# Patient Record
Sex: FEMALE | Race: WHITE | NOT HISPANIC OR LATINO | Employment: OTHER | URBAN - METROPOLITAN AREA
[De-identification: names, ages, dates, MRNs, and addresses within clinical notes are randomized per-mention and may not be internally consistent; named-entity substitution may affect disease eponyms.]

---

## 2020-07-27 ENCOUNTER — OFFICE VISIT (OUTPATIENT)
Dept: NEUROLOGY | Facility: CLINIC | Age: 65
End: 2020-07-27
Payer: COMMERCIAL

## 2020-07-27 VITALS
WEIGHT: 118 LBS | SYSTOLIC BLOOD PRESSURE: 123 MMHG | HEIGHT: 62 IN | BODY MASS INDEX: 21.71 KG/M2 | DIASTOLIC BLOOD PRESSURE: 75 MMHG | HEART RATE: 102 BPM

## 2020-07-27 DIAGNOSIS — G43.719 INTRACTABLE CHRONIC MIGRAINE WITHOUT AURA AND WITHOUT STATUS MIGRAINOSUS: Primary | ICD-10-CM

## 2020-07-27 DIAGNOSIS — G43.711 CHRONIC MIGRAINE WITHOUT AURA, INTRACTABLE, WITH STATUS MIGRAINOSUS: ICD-10-CM

## 2020-07-27 DIAGNOSIS — M62.838 MUSCLE SPASM: ICD-10-CM

## 2020-07-27 PROBLEM — G43.909 MIGRAINE HEADACHE: Status: ACTIVE | Noted: 2020-07-27

## 2020-07-27 PROBLEM — R25.2 SPASM: Status: ACTIVE | Noted: 2020-07-27

## 2020-07-27 PROBLEM — G44.229 CHRONIC TENSION HEADACHE: Status: ACTIVE | Noted: 2020-07-27

## 2020-07-27 PROCEDURE — 99202 OFFICE O/P NEW SF 15 MIN: CPT | Mod: 25 | Performed by: PSYCHIATRY & NEUROLOGY

## 2020-07-27 PROCEDURE — 64615 CHEMODENERV MUSC MIGRAINE: CPT | Performed by: PSYCHIATRY & NEUROLOGY

## 2020-07-27 RX ORDER — SIMVASTATIN 20 MG
TABLET ORAL
COMMUNITY
Start: 2020-07-23

## 2020-07-27 ASSESSMENT — MIFFLIN-ST. JEOR: SCORE: 1043.49

## 2020-07-27 NOTE — PROGRESS NOTES
NEUROLOGY NOTE        Assessment/Plan          Migraine and severe daily headaches: More 15 days headaches a month, and lasting more than 4 hours each attach.  Tried and failed Effexor, gabapentin and propranolol, Pamelor, amitriptyline. Will try Botox if approved. Risks vs benefits discussed. Was denied in the past.  Botox 155 units used, . With patient consent 5 units were given to the each of the following sites: procerus, bilateral , 3 sites of each side of the temporalis, cervicali, trapezius, and 4 sites of each side of the occipitalis. No immediate post injections complications.    Bilateral shoulder muscle spasm. 45 units used for the shoulders.    Discussed about other treatment options. Wants to continue current treatment. Will follow up in 3 months.           SUBJECTIVE         The patient is here today for follow-up intractable daily headaches.     More than 60% headache improvements. No side effects. Working 3 days/week. Exercising regularly. No depression. Two children grown up. One son, Sherif, family practice residence at [facility]. Sleeping well.  owns his own business, cutting back too.    No COVID-19 sickness    Migraine used to be severe daily headaches: More 15 days headaches a month, and lasting more than 4 hours each attach.  Tried and failed Effexor, gabapentin and propranolol, Pamelor, amitriptyline. Will try Botox, first injection 4/13/2918.  Risks vs benefits discussed.    Prednisone did break her headaches. But after finishing a tapering dose the headache came back. No side effects from the medication. Since Botox treatment she no longer takes daily painkillers.    The headache was daily before Botox treatment, better after sleep, severe 6/10 most of the time. Was taking daily pain killers, Tylenol, ASA, Tylenol with caffein, Motrin, 6 pills daily. Not able to tolerate amitriptyline. Failed Midrin, Imitrex. Failed Pamelor, amitriptyline.    She has had headaches for 30  "years without change in pattern. She wakes up with headaches and go to bed with headaches. She described as almost constant aching pain on top of her head. Periodically it gets severe feel like her head going to crack open and she would get sick to her stomach then she described that as a migraine. The severe headache fluctuate. She used to take Tylenol, Tylenol with Codeine, Motrin, aspirin, total average 6-8 pills a day. Starting from September /2016 she stopped all the medications she feels the severe ones getting less frequent. But then then she has lost tracking of how often and how much she is taking the pain killers. Sick to stomach and sensitive to light and sound. No visual auras. Not taking daily pain killers since 7/11/2017.    She tried amitriptyline for 1 week but became very sleepy and stopped it.              Review of system     10 point system review otherwise unremarkable    PHYSICAL EXAMINATION     Vital signs in last 24 hours:  Vitals:    07/27/20 0807   BP: 123/75   Pulse: 102   Weight: 53.5 kg (118 lb)   Height: 1.575 m (5' 2\")       She sitting, NAD. She is well dressed and wound. Normal mental status language and speech. She is normocephalic and atraumatic. Cranial nerve II-12 unremarkable. She has normal muscle bulk, tone and strength in 4 extremities. No clear focal sensory difficulties. Her coordination, gait and station normal. Muscles in shoulder are tense          Problem List     Patient Active Problem List    Diagnosis Date Noted     Migraine headache 07/27/2020     Priority: Medium     Chronic tension headache 07/27/2020     Priority: Medium     Spasm 07/27/2020     Priority: Medium         Past medical history     No past surgical history on file.    No past medical history on file.      Family history     No family history on file.      Social history     Social History     Socioeconomic History     Marital status:      Spouse name: Not on file     Number of children: Not on " file     Years of education: Not on file     Highest education level: Not on file   Occupational History     Not on file   Social Needs     Financial resource strain: Not on file     Food insecurity     Worry: Not on file     Inability: Not on file     Transportation needs     Medical: Not on file     Non-medical: Not on file   Tobacco Use     Smoking status: Never Smoker     Smokeless tobacco: Never Used   Substance and Sexual Activity     Alcohol use: Not Currently     Drug use: Not on file     Sexual activity: Not on file   Lifestyle     Physical activity     Days per week: Not on file     Minutes per session: Not on file     Stress: Not on file   Relationships     Social connections     Talks on phone: Not on file     Gets together: Not on file     Attends Temple service: Not on file     Active member of club or organization: Not on file     Attends meetings of clubs or organizations: Not on file     Relationship status: Not on file     Intimate partner violence     Fear of current or ex partner: Not on file     Emotionally abused: Not on file     Physically abused: Not on file     Forced sexual activity: Not on file   Other Topics Concern     Not on file   Social History Narrative     Not on file         Allergy     Patient has no known allergies.    MEDICATIONS List     Current Outpatient Medications   Medication Sig Dispense Refill     simvastatin (ZOCOR) 20 MG tablet                    RESULTS REVIEW         Khalida Cifuentes MD, MD, PhD  Neurology   Office tel: [phone]      Subjective     CC: Kailee Lam  is a 65 year old female who presents to clinic today for the following health issues:   Chief Complaint   Patient presents with     Injections     KHALIDA White

## 2020-07-27 NOTE — LETTER
7/27/2020         RE: Kailee Lam  [address]  [facility] [address]        Dear Colleague,    Thank you for referring your patient, Kailee Lam, to the [facility] [place]. Please see a copy of my visit note below.        NEUROLOGY NOTE        Assessment/Plan          Migraine and severe daily headaches: More 15 days headaches a month, and lasting more than 4 hours each attach.  Tried and failed Effexor, gabapentin and propranolol, Pamelor, amitriptyline. Will try Botox if approved. Risks vs benefits discussed. Was denied in the past.  Botox 155 units used, . With patient consent 5 units were given to the each of the following sites: procerus, bilateral , 3 sites of each side of the temporalis, cervicali, trapezius, and 4 sites of each side of the occipitalis. No immediate post injections complications.    Bilateral shoulder muscle spasm. 45 units used for the shoulders.    Discussed about other treatment options. Wants to continue current treatment. Will follow up in 3 months.           SUBJECTIVE         The patient is here today for follow-up intractable daily headaches.     More than 60% headache improvements. No side effects. Working 3 days/week. Exercising regularly. No depression. Two children grown up. One son, Sherif, family practice residence at [facility]. Sleeping well.  owns his own business, cutting back too.    No COVID-19 sickness    Migraine used to be severe daily headaches: More 15 days headaches a month, and lasting more than 4 hours each attach.  Tried and failed Effexor, gabapentin and propranolol, Pamelor, amitriptyline. Will try Botox, first injection 4/13/2918.  Risks vs benefits discussed.    Prednisone did break her headaches. But after finishing a tapering dose the headache came back. No side effects from the medication. Since Botox treatment she no longer takes daily painkillers.    The headache was daily before Botox treatment, better  "after sleep, severe 6/10 most of the time. Was taking daily pain killers, Tylenol, ASA, Tylenol with caffein, Motrin, 6 pills daily. Not able to tolerate amitriptyline. Failed Midrin, Imitrex. Failed Pamelor, amitriptyline.    She has had headaches for 30 years without change in pattern. She wakes up with headaches and go to bed with headaches. She described as almost constant aching pain on top of her head. Periodically it gets severe feel like her head going to crack open and she would get sick to her stomach then she described that as a migraine. The severe headache fluctuate. She used to take Tylenol, Tylenol with Codeine, Motrin, aspirin, total average 6-8 pills a day. Starting from September /2016 she stopped all the medications she feels the severe ones getting less frequent. But then then she has lost tracking of how often and how much she is taking the pain killers. Sick to stomach and sensitive to light and sound. No visual auras. Not taking daily pain killers since 7/11/2017.    She tried amitriptyline for 1 week but became very sleepy and stopped it.              Review of system     10 point system review otherwise unremarkable    PHYSICAL EXAMINATION     Vital signs in last 24 hours:  Vitals:    07/27/20 0807   BP: 123/75   Pulse: 102   Weight: 53.5 kg (118 lb)   Height: 1.575 m (5' 2\")       She sitting, NAD. She is well dressed and wound. Normal mental status language and speech. She is normocephalic and atraumatic. Cranial nerve II-12 unremarkable. She has normal muscle bulk, tone and strength in 4 extremities. No clear focal sensory difficulties. Her coordination, gait and station normal. Muscles in shoulder are tense          Problem List     Patient Active Problem List    Diagnosis Date Noted     Migraine headache 07/27/2020     Priority: Medium     Chronic tension headache 07/27/2020     Priority: Medium     Spasm 07/27/2020     Priority: Medium         Past medical history     No past surgical " history on file.    No past medical history on file.      Family history     No family history on file.      Social history     Social History     Socioeconomic History     Marital status:      Spouse name: Not on file     Number of children: Not on file     Years of education: Not on file     Highest education level: Not on file   Occupational History     Not on file   Social Needs     Financial resource strain: Not on file     Food insecurity     Worry: Not on file     Inability: Not on file     Transportation needs     Medical: Not on file     Non-medical: Not on file   Tobacco Use     Smoking status: Never Smoker     Smokeless tobacco: Never Used   Substance and Sexual Activity     Alcohol use: Not Currently     Drug use: Not on file     Sexual activity: Not on file   Lifestyle     Physical activity     Days per week: Not on file     Minutes per session: Not on file     Stress: Not on file   Relationships     Social connections     Talks on phone: Not on file     Gets together: Not on file     Attends Hinduism service: Not on file     Active member of club or organization: Not on file     Attends meetings of clubs or organizations: Not on file     Relationship status: Not on file     Intimate partner violence     Fear of current or ex partner: Not on file     Emotionally abused: Not on file     Physically abused: Not on file     Forced sexual activity: Not on file   Other Topics Concern     Not on file   Social History Narrative     Not on file         Allergy     Patient has no known allergies.    MEDICATIONS List     Current Outpatient Medications   Medication Sig Dispense Refill     simvastatin (ZOCOR) 20 MG tablet                    RESULTS REVIEW         Khalida Cifuentes MD, MD, PhD  Neurology   Office tel: [phone]      Subjective     CC: Kailee Lam  is a 65 year old female who presents to clinic today for the following health issues:   Chief Complaint   Patient presents with     Injections      KHALIDA White    Again, thank you for allowing me to participate in the care of your patient.        Sincerely,        Khalida Cifuentes MD

## 2020-08-11 ENCOUNTER — TELEPHONE (OUTPATIENT)
Dept: NEUROLOGY | Facility: CLINIC | Age: 65
End: 2020-08-11

## 2020-10-30 ENCOUNTER — OFFICE VISIT (OUTPATIENT)
Dept: NEUROLOGY | Facility: CLINIC | Age: 65
End: 2020-10-30
Payer: COMMERCIAL

## 2020-10-30 VITALS
HEIGHT: 62 IN | BODY MASS INDEX: 22.08 KG/M2 | SYSTOLIC BLOOD PRESSURE: 119 MMHG | WEIGHT: 120 LBS | HEART RATE: 90 BPM | DIASTOLIC BLOOD PRESSURE: 74 MMHG

## 2020-10-30 DIAGNOSIS — G43.711 CHRONIC MIGRAINE WITHOUT AURA, INTRACTABLE, WITH STATUS MIGRAINOSUS: ICD-10-CM

## 2020-10-30 DIAGNOSIS — G43.719 INTRACTABLE CHRONIC MIGRAINE WITHOUT AURA AND WITHOUT STATUS MIGRAINOSUS: Primary | ICD-10-CM

## 2020-10-30 PROBLEM — R73.03 PREDIABETES: Status: ACTIVE | Noted: 2017-11-24

## 2020-10-30 PROBLEM — R31.0 GROSS HEMATURIA: Status: ACTIVE | Noted: 2020-07-23

## 2020-10-30 PROCEDURE — 64642 CHEMODENERV 1 EXTREMITY 1-4: CPT | Mod: XS | Performed by: PSYCHIATRY & NEUROLOGY

## 2020-10-30 PROCEDURE — 64615 CHEMODENERV MUSC MIGRAINE: CPT | Performed by: PSYCHIATRY & NEUROLOGY

## 2020-10-30 PROCEDURE — 64643 CHEMODENERV 1 EXTREM 1-4 EA: CPT | Mod: LT | Performed by: PSYCHIATRY & NEUROLOGY

## 2020-10-30 PROCEDURE — 99207 PR NO CHARGE LOS: CPT | Performed by: PSYCHIATRY & NEUROLOGY

## 2020-10-30 ASSESSMENT — MIFFLIN-ST. JEOR: SCORE: 1052.57

## 2020-10-30 NOTE — PROGRESS NOTES
NEUROLOGY NOTE        Assessment/Plan          Migraine and severe daily headaches: More 15 days headaches a month, and lasting more than 4 hours each attach.  Tried and failed Effexor, gabapentin and propranolol, Pamelor, amitriptyline. Will try Botox if approved. Risks vs benefits discussed. Was denied in the past.  Botox 155 units used, . With patient consent 5 units were given to the each of the following sites: procerus, bilateral , 3 sites of each side of the temporalis, cervicali, trapezius, and 4 sites of each side of the occipitalis. No immediate post injections complications.    Bilateral shoulder muscle spasm. 45 units used for the shoulders.    Discussed about other treatment options. Wants to continue current treatment. Will follow up in 3 months.          SUBJECTIVE         Migraine and severe daily headaches: More 15 days headaches a month, and lasting more than 4 hours each attach.  Tried and failed Effexor, gabapentin and propranolol, Pamelor, amitriptyline. Will try Botox if approved. Risks vs benefits discussed. Was denied in the past.  Botox 155 units used, . With patient consent 5 units were given to the each of the following sites: procerus, bilateral , 3 sites of each side of the temporalis, cervicali, trapezius, and 4 sites of each side of the occipitalis. No immediate post injections complications.    Bilateral shoulder muscle spasm. 45 units used for the shoulders.    Discussed about other treatment options. Wants to continue current treatment. Will follow up in 3 months.               SUBJECTIVE            The patient is here today for follow-up intractable daily headaches.     More than 60% headache improvements. No side effects. Working 3 days/week. Exercising regularly. No depression. Two children grown up. One son, Sherif, family practice residence at [facility]. Sleeping well.  owns his own business, cutting back too.    No COVID-19 sickness    Migraine  "used to be severe daily headaches: More 15 days headaches a month, and lasting more than 4 hours each attach.  Tried and failed Effexor, gabapentin and propranolol, Pamelor, amitriptyline. Will try Botox, first injection 4/13/2918.  Risks vs benefits discussed.    Prednisone did break her headaches. But after finishing a tapering dose the headache came back. No side effects from the medication. Since Botox treatment she no longer takes daily painkillers.    The headache was daily before Botox treatment, better after sleep, severe 6/10 most of the time. Was taking daily pain killers, Tylenol, ASA, Tylenol with caffein, Motrin, 6 pills daily. Not able to tolerate amitriptyline. Failed Midrin, Imitrex. Failed Pamelor, amitriptyline.    She has had headaches for 30 years without change in pattern. She wakes up with headaches and go to bed with headaches. She described as almost constant aching pain on top of her head. Periodically it gets severe feel like her head going to crack open and she would get sick to her stomach then she described that as a migraine. The severe headache fluctuate. She used to take Tylenol, Tylenol with Codeine, Motrin, aspirin, total average 6-8 pills a day. Starting from September /2016 she stopped all the medications she feels the severe ones getting less frequent. But then then she has lost tracking of how often and how much she is taking the pain killers. Sick to stomach and sensitive to light and sound. No visual auras. Not taking daily pain killers since 7/11/2017.    She tried amitriptyline for 1 week but became very sleepy and stopped it.                    Review of system     10 point system review otherwise unremarkable    PHYSICAL EXAMINATION     Vital signs in last 24 hours:  Vitals:    10/30/20 1004   BP: 119/74   Pulse: 90   Weight: 54.4 kg (120 lb)   Height: 1.575 m (5' 2\")     She sitting, NAD. She is well dressed and wound. Normal mental status language and speech. She is " normocephalic and atraumatic. Cranial nerve II-12 unremarkable. She has normal muscle bulk, tone and strength in 4 extremities. No clear focal sensory difficulties. Her coordination, gait and station normal. Muscles in shoulder are tense         Problem List     Patient Active Problem List    Diagnosis Date Noted     Migraine headache 07/27/2020     Priority: Medium     Chronic tension headache 07/27/2020     Priority: Medium     Spasm 07/27/2020     Priority: Medium     Gross hematuria 07/23/2020     Priority: Medium     Chronic. Followed by [facility]       Prediabetes 11/24/2017     Priority: Medium     Dysplastic nevus 07/06/2012     Priority: Medium     Needs yearly skin check by Dermatology.       Mixed hyperlipidemia 11/19/2002     Priority: Medium     Allergic rhinitis 11/19/2002     Priority: Medium     Epic           Past medical history     migraine      Family history     History reviewed. No pertinent family history.      Social history     Social History     Socioeconomic History     Marital status:      Spouse name: Not on file     Number of children: Not on file     Years of education: Not on file     Highest education level: Not on file   Occupational History     Not on file   Social Needs     Financial resource strain: Not on file     Food insecurity     Worry: Not on file     Inability: Not on file     Transportation needs     Medical: Not on file     Non-medical: Not on file   Tobacco Use     Smoking status: Never Smoker     Smokeless tobacco: Never Used   Substance and Sexual Activity     Alcohol use: Not Currently     Drug use: Not on file     Sexual activity: Not on file   Lifestyle     Physical activity     Days per week: Not on file     Minutes per session: Not on file     Stress: Not on file   Relationships     Social connections     Talks on phone: Not on file     Gets together: Not on file     Attends Bahai service: Not on file     Active member of club or organization: Not  on file     Attends meetings of clubs or organizations: Not on file     Relationship status: Not on file     Intimate partner violence     Fear of current or ex partner: Not on file     Emotionally abused: Not on file     Physically abused: Not on file     Forced sexual activity: Not on file   Other Topics Concern     Not on file   Social History Narrative     Not on file         Allergy     Patient has no known allergies.    MEDICATIONS List     Current Outpatient Medications   Medication Sig Dispense Refill     simvastatin (ZOCOR) 20 MG tablet                      Khalida Cifuentes MD, MD, PhD  Neurology   Office tel: [phone]        This note was dictated using voice recognition software.  Any grammatical or context distortions are unintentional and inherent to the software. The note is tailored to serve physicians for communications among them, who knows what are the most important elements of history taken for disease diagnosis and differentials as well as management plans. Due to time factors, the notes are in general not reviewed before signing. Again due to time factor, follow-up notes often carries over old notes if they are relevant, so most clinic time is dedicated to interviewing with patients and caregivers, on clinical assessment, coordinating care and management.

## 2020-10-30 NOTE — LETTER
10/30/2020         RE: Kailee Lam  [address] [facility] [address]        Dear Colleague,    Thank you for referring your patient, Kailee Lam, to the [facility] [place]. Please see a copy of my visit note below.        NEUROLOGY NOTE        Assessment/Plan          Migraine and severe daily headaches: More 15 days headaches a month, and lasting more than 4 hours each attach.  Tried and failed Effexor, gabapentin and propranolol, Pamelor, amitriptyline. Will try Botox if approved. Risks vs benefits discussed. Was denied in the past.  Botox 155 units used, . With patient consent 5 units were given to the each of the following sites: procerus, bilateral , 3 sites of each side of the temporalis, cervicali, trapezius, and 4 sites of each side of the occipitalis. No immediate post injections complications.    Bilateral shoulder muscle spasm. 45 units used for the shoulders.    Discussed about other treatment options. Wants to continue current treatment. Will follow up in 3 months.          SUBJECTIVE         Migraine and severe daily headaches: More 15 days headaches a month, and lasting more than 4 hours each attach.  Tried and failed Effexor, gabapentin and propranolol, Pamelor, amitriptyline. Will try Botox if approved. Risks vs benefits discussed. Was denied in the past.  Botox 155 units used, . With patient consent 5 units were given to the each of the following sites: procerus, bilateral , 3 sites of each side of the temporalis, cervicali, trapezius, and 4 sites of each side of the occipitalis. No immediate post injections complications.    Bilateral shoulder muscle spasm. 45 units used for the shoulders.    Discussed about other treatment options. Wants to continue current treatment. Will follow up in 3 months.               SUBJECTIVE            The patient is here today for follow-up intractable daily headaches.     More than 60% headache  improvements. No side effects. Working 3 days/week. Exercising regularly. No depression. Two children grown up. One son, Sherif, family practice residence at [facility]. Sleeping well.  owns his own business, cutting back too.    No COVID-19 sickness    Migraine used to be severe daily headaches: More 15 days headaches a month, and lasting more than 4 hours each attach.  Tried and failed Effexor, gabapentin and propranolol, Pamelor, amitriptyline. Will try Botox, first injection 4/13/2918.  Risks vs benefits discussed.    Prednisone did break her headaches. But after finishing a tapering dose the headache came back. No side effects from the medication. Since Botox treatment she no longer takes daily painkillers.    The headache was daily before Botox treatment, better after sleep, severe 6/10 most of the time. Was taking daily pain killers, Tylenol, ASA, Tylenol with caffein, Motrin, 6 pills daily. Not able to tolerate amitriptyline. Failed Midrin, Imitrex. Failed Pamelor, amitriptyline.    She has had headaches for 30 years without change in pattern. She wakes up with headaches and go to bed with headaches. She described as almost constant aching pain on top of her head. Periodically it gets severe feel like her head going to crack open and she would get sick to her stomach then she described that as a migraine. The severe headache fluctuate. She used to take Tylenol, Tylenol with Codeine, Motrin, aspirin, total average 6-8 pills a day. Starting from September /2016 she stopped all the medications she feels the severe ones getting less frequent. But then then she has lost tracking of how often and how much she is taking the pain killers. Sick to stomach and sensitive to light and sound. No visual auras. Not taking daily pain killers since 7/11/2017.    She tried amitriptyline for 1 week but became very sleepy and stopped it.                    Review of system     10 point system review otherwise  "unremarkable    PHYSICAL EXAMINATION     Vital signs in last 24 hours:  Vitals:    10/30/20 1004   BP: 119/74   Pulse: 90   Weight: 54.4 kg (120 lb)   Height: 1.575 m (5' 2\")     She sitting, NAD. She is well dressed and wound. Normal mental status language and speech. She is normocephalic and atraumatic. Cranial nerve II-12 unremarkable. She has normal muscle bulk, tone and strength in 4 extremities. No clear focal sensory difficulties. Her coordination, gait and station normal. Muscles in shoulder are tense         Problem List     Patient Active Problem List    Diagnosis Date Noted     Migraine headache 07/27/2020     Priority: Medium     Chronic tension headache 07/27/2020     Priority: Medium     Spasm 07/27/2020     Priority: Medium     Gross hematuria 07/23/2020     Priority: Medium     Chronic. Followed by [facility]       Prediabetes 11/24/2017     Priority: Medium     Dysplastic nevus 07/06/2012     Priority: Medium     Needs yearly skin check by Dermatology.       Mixed hyperlipidemia 11/19/2002     Priority: Medium     Allergic rhinitis 11/19/2002     Priority: Medium     Epic           Past medical history     migraine      Family history     History reviewed. No pertinent family history.      Social history     Social History     Socioeconomic History     Marital status:      Spouse name: Not on file     Number of children: Not on file     Years of education: Not on file     Highest education level: Not on file   Occupational History     Not on file   Social Needs     Financial resource strain: Not on file     Food insecurity     Worry: Not on file     Inability: Not on file     Transportation needs     Medical: Not on file     Non-medical: Not on file   Tobacco Use     Smoking status: Never Smoker     Smokeless tobacco: Never Used   Substance and Sexual Activity     Alcohol use: Not Currently     Drug use: Not on file     Sexual activity: Not on file   Lifestyle     Physical activity     Days " per week: Not on file     Minutes per session: Not on file     Stress: Not on file   Relationships     Social connections     Talks on phone: Not on file     Gets together: Not on file     Attends Church service: Not on file     Active member of club or organization: Not on file     Attends meetings of clubs or organizations: Not on file     Relationship status: Not on file     Intimate partner violence     Fear of current or ex partner: Not on file     Emotionally abused: Not on file     Physically abused: Not on file     Forced sexual activity: Not on file   Other Topics Concern     Not on file   Social History Narrative     Not on file         Allergy     Patient has no known allergies.    MEDICATIONS List     Current Outpatient Medications   Medication Sig Dispense Refill     simvastatin (ZOCOR) 20 MG tablet                      Khalida Cifuentes MD, MD, PhD  Neurology   Office tel: [phone]        This note was dictated using voice recognition software.  Any grammatical or context distortions are unintentional and inherent to the software. The note is tailored to serve physicians for communications among them, who knows what are the most important elements of history taken for disease diagnosis and differentials as well as management plans. Due to time factors, the notes are in general not reviewed before signing. Again due to time factor, follow-up notes often carries over old notes if they are relevant, so most clinic time is dedicated to interviewing with patients and caregivers, on clinical assessment, coordinating care and management.         Again, thank you for allowing me to participate in the care of your patient.        Sincerely,        Khalida Cifuentes MD

## 2020-11-03 ENCOUNTER — TELEPHONE (OUTPATIENT)
Dept: NEUROLOGY | Facility: CLINIC | Age: 65
End: 2020-11-03

## 2021-02-02 ENCOUNTER — OFFICE VISIT (OUTPATIENT)
Dept: NEUROLOGY | Facility: CLINIC | Age: 66
End: 2021-02-02
Payer: COMMERCIAL

## 2021-02-02 VITALS
HEIGHT: 62 IN | WEIGHT: 120 LBS | SYSTOLIC BLOOD PRESSURE: 128 MMHG | DIASTOLIC BLOOD PRESSURE: 76 MMHG | BODY MASS INDEX: 22.08 KG/M2 | HEART RATE: 95 BPM

## 2021-02-02 DIAGNOSIS — M62.838 MUSCLE SPASM: ICD-10-CM

## 2021-02-02 DIAGNOSIS — G43.711 CHRONIC MIGRAINE WITHOUT AURA, INTRACTABLE, WITH STATUS MIGRAINOSUS: Primary | ICD-10-CM

## 2021-02-02 PROCEDURE — 64642 CHEMODENERV 1 EXTREMITY 1-4: CPT | Mod: 59 | Performed by: PSYCHIATRY & NEUROLOGY

## 2021-02-02 PROCEDURE — 64643 CHEMODENERV 1 EXTREM 1-4 EA: CPT | Mod: 59 | Performed by: PSYCHIATRY & NEUROLOGY

## 2021-02-02 PROCEDURE — 64615 CHEMODENERV MUSC MIGRAINE: CPT | Mod: 59 | Performed by: PSYCHIATRY & NEUROLOGY

## 2021-02-02 ASSESSMENT — MIFFLIN-ST. JEOR: SCORE: 1052.57

## 2021-02-02 NOTE — PROGRESS NOTES
NEUROLOGY NOTE        Assessment/Plan          Migraine and severe daily headaches: More 15 days headaches a month, and lasting more than 4 hours each attach.  Tried and failed Effexor, gabapentin and propranolol, Pamelor, amitriptyline. Will try Botox if approved. Risks vs benefits discussed. Was denied in the past.  Total Botox 170 units used 140 for migraine and 30 units for left shoulder spasm.  50 units discarded.  Consent received.   No immediate post injections complications.    Bilateral shoulder muscle spasm. 30 units used for the shoulders.      Will follow up in 3 months.           SUBJECTIVE         The patient is here today for follow-up intractable daily headaches.     More than 60% headache improvements. No side effects. Working 3 days/week. Exercising regularly. No depression. Two children grown up. One son, Sherif, family practice residence at [facility]. Sleeping well.  owns his own business, cutting back too.    No COVID-19 sickness    Migraine used to be severe daily headaches: More 15 days headaches a month, and lasting more than 4 hours each attach.  Tried and failed Effexor, gabapentin and propranolol, Pamelor, amitriptyline. Will try Botox, first injection 4/13/2918.  Risks vs benefits discussed.    Prednisone did break her headaches. But after finishing a tapering dose the headache came back. No side effects from the medication. Since Botox treatment she no longer takes daily painkillers.    The headache was daily before Botox treatment, better after sleep, severe 6/10 most of the time. Was taking daily pain killers, Tylenol, ASA, Tylenol with caffein, Motrin, 6 pills daily. Not able to tolerate amitriptyline. Failed Midrin, Imitrex. Failed Pamelor, amitriptyline.    She has had headaches for 30 years without change in pattern. She wakes up with headaches and go to bed with headaches. She described as almost constant aching pain on top of her head. Periodically it gets severe feel  "like her head going to crack open and she would get sick to her stomach then she described that as a migraine. The severe headache fluctuate. She used to take Tylenol, Tylenol with Codeine, Motrin, aspirin, total average 6-8 pills a day. Starting from September /2016 she stopped all the medications she feels the severe ones getting less frequent. But then then she has lost tracking of how often and how much she is taking the pain killers. Sick to stomach and sensitive to light and sound. No visual auras. Not taking daily pain killers since 7/11/2017.    She tried amitriptyline for 1 week but became very sleepy and stopped it.               Review of system     10 point system review otherwise unremarkable    PHYSICAL EXAMINATION     Vital signs in last 24 hours:  Vitals:    02/02/21 0902   BP: 128/76   Pulse: 95   Weight: 54.4 kg (120 lb)   Height: 1.575 m (5' 2\")       The patient is sitting in chair without acute distress. Well dressed, groomed, and nourished. Atraumatic, and normocephalic.  Mood is fine.  Cognitive function appropriate.  Exam of the head, neck, eyes, ears, nose and mouth negative.  Neck supple.  No skin rashes or edema. Normal mental status, language, and speech. CN II to XII unremarkable. Normal msucle bulk, tone, and strength. No focal sensory difficulty. Coordination, gait, and station normal.       Problem List     Patient Active Problem List    Diagnosis Date Noted     Migraine headache 07/27/2020     Priority: Medium     Chronic tension headache 07/27/2020     Priority: Medium     Spasm 07/27/2020     Priority: Medium     Gross hematuria 07/23/2020     Priority: Medium     Chronic. Followed by [facility]       Prediabetes 11/24/2017     Priority: Medium     Dysplastic nevus 07/06/2012     Priority: Medium     Needs yearly skin check by Dermatology.       Mixed hyperlipidemia 11/19/2002     Priority: Medium     Allergic rhinitis 11/19/2002     Priority: Medium     Epic           Past " medical history     No past surgical history on file.    Past Medical History:   Diagnosis Date     Intractable migraine without aura and with status migrainosus            Family history     No family history on file.      Social history     Social History     Socioeconomic History     Marital status:      Spouse name: Not on file     Number of children: Not on file     Years of education: Not on file     Highest education level: Not on file   Occupational History     Not on file   Social Needs     Financial resource strain: Not on file     Food insecurity     Worry: Not on file     Inability: Not on file     Transportation needs     Medical: Not on file     Non-medical: Not on file   Tobacco Use     Smoking status: Never Smoker     Smokeless tobacco: Never Used   Substance and Sexual Activity     Alcohol use: Not Currently     Drug use: Not on file     Sexual activity: Not on file   Lifestyle     Physical activity     Days per week: Not on file     Minutes per session: Not on file     Stress: Not on file   Relationships     Social connections     Talks on phone: Not on file     Gets together: Not on file     Attends Episcopal service: Not on file     Active member of club or organization: Not on file     Attends meetings of clubs or organizations: Not on file     Relationship status: Not on file     Intimate partner violence     Fear of current or ex partner: Not on file     Emotionally abused: Not on file     Physically abused: Not on file     Forced sexual activity: Not on file   Other Topics Concern     Not on file   Social History Narrative     Not on file         Allergy     Patient has no known allergies.    MEDICATIONS List     Current Outpatient Medications   Medication Sig Dispense Refill     simvastatin (ZOCOR) 20 MG tablet                      Kahlida Cifuentes MD, MD, PhD  Neurology   Office tel: [phone]        This note was dictated using voice recognition software.  Any grammatical or context  distortions are unintentional and inherent to the software. The note is tailored to serve physicians for communications among them, who knows what are the most important elements of history taken for disease diagnosis and differentials as well as management plans. Due to time factors, the notes are in general not reviewed before signing. Again due to time factor, follow-up notes often carries over old notes if they are relevant, so most clinic time is dedicated to interviewing with patients and caregivers, on clinical assessment, coordinating care and management.

## 2021-02-02 NOTE — LETTER
2/2/2021         RE: Kailee Lam  [address] [facility] [address]        Dear Colleague,    Thank you for referring your patient, Kailee Lam, to the [facility] [place]. Please see a copy of my visit note below.        NEUROLOGY NOTE        Assessment/Plan          Migraine and severe daily headaches: More 15 days headaches a month, and lasting more than 4 hours each attach.  Tried and failed Effexor, gabapentin and propranolol, Pamelor, amitriptyline. Will try Botox if approved. Risks vs benefits discussed. Was denied in the past.  Total Botox 170 units used 140 for migraine and 30 units for left shoulder spasm.  50 units discarded.  Consent received.   No immediate post injections complications.    Bilateral shoulder muscle spasm. 30 units used for the shoulders.      Will follow up in 3 months.           SUBJECTIVE         The patient is here today for follow-up intractable daily headaches.     More than 60% headache improvements. No side effects. Working 3 days/week. Exercising regularly. No depression. Two children grown up. One son, Sherif, family practice residence at [facility]. Sleeping well.  owns his own business, cutting back too.    No COVID-19 sickness    Migraine used to be severe daily headaches: More 15 days headaches a month, and lasting more than 4 hours each attach.  Tried and failed Effexor, gabapentin and propranolol, Pamelor, amitriptyline. Will try Botox, first injection 4/13/2918.  Risks vs benefits discussed.    Prednisone did break her headaches. But after finishing a tapering dose the headache came back. No side effects from the medication. Since Botox treatment she no longer takes daily painkillers.    The headache was daily before Botox treatment, better after sleep, severe 6/10 most of the time. Was taking daily pain killers, Tylenol, ASA, Tylenol with caffein, Motrin, 6 pills daily. Not able to tolerate amitriptyline. Failed Midrin,  "Imitrex. Failed Pamelor, amitriptyline.    She has had headaches for 30 years without change in pattern. She wakes up with headaches and go to bed with headaches. She described as almost constant aching pain on top of her head. Periodically it gets severe feel like her head going to crack open and she would get sick to her stomach then she described that as a migraine. The severe headache fluctuate. She used to take Tylenol, Tylenol with Codeine, Motrin, aspirin, total average 6-8 pills a day. Starting from September /2016 she stopped all the medications she feels the severe ones getting less frequent. But then then she has lost tracking of how often and how much she is taking the pain killers. Sick to stomach and sensitive to light and sound. No visual auras. Not taking daily pain killers since 7/11/2017.    She tried amitriptyline for 1 week but became very sleepy and stopped it.               Review of system     10 point system review otherwise unremarkable    PHYSICAL EXAMINATION     Vital signs in last 24 hours:  Vitals:    02/02/21 0902   BP: 128/76   Pulse: 95   Weight: 54.4 kg (120 lb)   Height: 1.575 m (5' 2\")       The patient is sitting in chair without acute distress. Well dressed, groomed, and nourished. Atraumatic, and normocephalic.  Mood is fine.  Cognitive function appropriate.  Exam of the head, neck, eyes, ears, nose and mouth negative.  Neck supple.  No skin rashes or edema. Normal mental status, language, and speech. CN II to XII unremarkable. Normal msucle bulk, tone, and strength. No focal sensory difficulty. Coordination, gait, and station normal.       Problem List     Patient Active Problem List    Diagnosis Date Noted     Migraine headache 07/27/2020     Priority: Medium     Chronic tension headache 07/27/2020     Priority: Medium     Spasm 07/27/2020     Priority: Medium     Gross hematuria 07/23/2020     Priority: Medium     Chronic. Followed by [facility]       Prediabetes 11/24/2017 "     Priority: Medium     Dysplastic nevus 07/06/2012     Priority: Medium     Needs yearly skin check by Dermatology.       Mixed hyperlipidemia 11/19/2002     Priority: Medium     Allergic rhinitis 11/19/2002     Priority: Medium     Epic           Past medical history     No past surgical history on file.    Past Medical History:   Diagnosis Date     Intractable migraine without aura and with status migrainosus            Family history     No family history on file.      Social history     Social History     Socioeconomic History     Marital status:      Spouse name: Not on file     Number of children: Not on file     Years of education: Not on file     Highest education level: Not on file   Occupational History     Not on file   Social Needs     Financial resource strain: Not on file     Food insecurity     Worry: Not on file     Inability: Not on file     Transportation needs     Medical: Not on file     Non-medical: Not on file   Tobacco Use     Smoking status: Never Smoker     Smokeless tobacco: Never Used   Substance and Sexual Activity     Alcohol use: Not Currently     Drug use: Not on file     Sexual activity: Not on file   Lifestyle     Physical activity     Days per week: Not on file     Minutes per session: Not on file     Stress: Not on file   Relationships     Social connections     Talks on phone: Not on file     Gets together: Not on file     Attends Yazidi service: Not on file     Active member of club or organization: Not on file     Attends meetings of clubs or organizations: Not on file     Relationship status: Not on file     Intimate partner violence     Fear of current or ex partner: Not on file     Emotionally abused: Not on file     Physically abused: Not on file     Forced sexual activity: Not on file   Other Topics Concern     Not on file   Social History Narrative     Not on file         Allergy     Patient has no known allergies.    MEDICATIONS List     Current Outpatient  Medications   Medication Sig Dispense Refill     simvastatin (ZOCOR) 20 MG tablet                      Khalida Cifuentes MD, MD, PhD  Neurology   Office tel: [phone]        This note was dictated using voice recognition software.  Any grammatical or context distortions are unintentional and inherent to the software. The note is tailored to serve physicians for communications among them, who knows what are the most important elements of history taken for disease diagnosis and differentials as well as management plans. Due to time factors, the notes are in general not reviewed before signing. Again due to time factor, follow-up notes often carries over old notes if they are relevant, so most clinic time is dedicated to interviewing with patients and caregivers, on clinical assessment, coordinating care and management.         Again, thank you for allowing me to participate in the care of your patient.        Sincerely,        Khalida Cifuentes MD

## 2021-03-31 ENCOUNTER — COMMUNICATION - HEALTHEAST (OUTPATIENT)
Dept: SCHEDULING | Facility: CLINIC | Age: 66
End: 2021-03-31

## 2021-06-16 NOTE — TELEPHONE ENCOUNTER
04/02 Left message apologizing for the misunderstanding. Left message to call us if we can further assist her.

## 2021-06-16 NOTE — TELEPHONE ENCOUNTER
New Appointment Needed  What is the reason for the visit:    Same Date/Next Day Appt Request  What is the reason for your visit?: New Patient physical Establish Care    Provider Preference: Dr. Avelar   How soon do you need to be seen?: asap  Waitlist offered?: No  Okay to leave a detailed message:  Yes    Patient wants to schedule a new patient physical est care with Dr. Avelar. Dr. Avelar came up in the new patient and new patient physical search but her schedule would not pull up to schedule an appointment.    Please call and assist with scheduling new patient physical est care appointment.     [facility] Connection / Contact Center is not able to schedule due to not having override access.

## 2021-11-09 ENCOUNTER — IMMUNIZATION (OUTPATIENT)
Dept: NURSING | Facility: CLINIC | Age: 66
End: 2021-11-09
Payer: COMMERCIAL

## 2021-11-09 PROCEDURE — 91300 PR COVID VAC PFIZER DIL RECON 30 MCG/0.3 ML IM: CPT

## 2021-11-09 PROCEDURE — 0004A PR COVID VAC PFIZER DIL RECON 30 MCG/0.3 ML IM: CPT

## 2022-05-31 ENCOUNTER — IMMUNIZATION (OUTPATIENT)
Dept: NURSING | Facility: CLINIC | Age: 67
End: 2022-05-31
Payer: COMMERCIAL

## 2022-05-31 PROCEDURE — 91305 COVID-19,PF,PFIZER (12+ YRS): CPT

## 2022-05-31 PROCEDURE — 0054A COVID-19,PF,PFIZER (12+ YRS): CPT

## 2022-10-27 ENCOUNTER — IMMUNIZATION (OUTPATIENT)
Dept: NURSING | Facility: CLINIC | Age: 67
End: 2022-10-27
Payer: COMMERCIAL

## 2022-10-27 PROCEDURE — G0008 ADMIN INFLUENZA VIRUS VAC: HCPCS

## 2022-10-27 PROCEDURE — 91312 COVID-19,PF,PFIZER BOOSTER BIVALENT: CPT

## 2022-10-27 PROCEDURE — 90662 IIV NO PRSV INCREASED AG IM: CPT

## 2022-10-27 PROCEDURE — 0124A COVID-19,PF,PFIZER BOOSTER BIVALENT: CPT

## 2023-01-16 ENCOUNTER — OFFICE VISIT (OUTPATIENT)
Dept: NEUROLOGY | Facility: CLINIC | Age: 68
End: 2023-01-16
Payer: COMMERCIAL

## 2023-01-16 VITALS
SYSTOLIC BLOOD PRESSURE: 136 MMHG | HEART RATE: 96 BPM | DIASTOLIC BLOOD PRESSURE: 87 MMHG | HEIGHT: 62 IN | WEIGHT: 115.2 LBS | BODY MASS INDEX: 21.2 KG/M2

## 2023-01-16 DIAGNOSIS — G44.201 INTRACTABLE TENSION-TYPE HEADACHE, UNSPECIFIED CHRONICITY PATTERN: Primary | ICD-10-CM

## 2023-01-16 PROCEDURE — 99204 OFFICE O/P NEW MOD 45 MIN: CPT | Performed by: PSYCHIATRY & NEUROLOGY

## 2023-01-16 RX ORDER — TOPIRAMATE 25 MG/1
TABLET, FILM COATED ORAL
Qty: 120 TABLET | Refills: 5 | Status: SHIPPED | OUTPATIENT
Start: 2023-01-16

## 2023-01-16 NOTE — LETTER
"    1/16/2023         RE: Kailee Lam  [address]  [facility] [address]        Dear Colleague,    Thank you for referring your patient, Kailee Lam, to the [facility] [place]. Please see a copy of my visit note below.    INITIAL NEUROLOGY CONSULTATION    DATE OF VISIT: 1/16/2023  MRN: [id number]  PATIENT NAME: Kailee Lam  YOB: 1955    REFERRING PROVIDER: Referred Self    Chief Complaint   Patient presents with     migraines     No concerns       SUBJECTIVE:                                                      HPI:   Kailee Lam is a 67 year old female self-referred for headaches.  She previously followed with Dr. Cifuentes ().  Per chart review, she has daily headaches and migraine headaches.  Intractable, occurring more than 15 days/month though she was noted to have good response to Botox, more than 50% improvement.  Patient describes decreased severity of her pain but was still having frequent headaches so in June 2021 and she was started on Aimovig.  She has tried Effexor, gabapentin, propranolol, Pamelor and amitriptyline in the past without effective headache control/with side effects.  It looks like her most recent Botox treatment was in August of last year.    The headaches are still there. She says the Botox used to decrease the headache severity, but never really made a difference in terms of frequency.  She describes daily pressure-like headaches.  She says she has never had typical migraine headaches.  Her mother and maternal aunt have typical migraine symptoms.  She says that her her own headaches are pressure-like.  They often involve the top of the head and then migrate toward the forehead, sometimes involving a band around the entire head.  These can last all day though evenings tend to be best.  She denies aura's, does not have any light sensitivity.  She says it is just pressure that \"gets old.\"    She does not want to do injections, so she " "never tried the Aimovig.  Her  has cluster headaches and is on an injectable for this which works well for him.    She has not been taking any over the counter medications for about 3 weeks. She was previously taking quite a bit of over-the-counter medication, since her 20s.  She has noticed that the severity of her headaches has decreased in these past few weeks off of the over-the-counter medications.  She is aware of medication overuse/rebound headache.    She did do some physical therapy for her neck and this did not seem to help.  She does not think she tried Topamax.  She does recall when she was on one of the medications that is also an antidepressant that she became quite depressed.    No history of kidney stones.  She says that Dr. Cifuentes never did any imaging, did not feel that it was necessary based on her exam.  She says that Dr. 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    Past Medical History:   Diagnosis Date     Intractable migraine without aura and with status migrainosus      No past surgical history on file.    simvastatin (ZOCOR) 20 MG tablet,     No current facility-administered medications on file prior to visit.    No Known Allergies      Social History     Tobacco Use     Smoking status: Never     Smokeless tobacco: Never   Substance Use Topics     Alcohol use: Not Currently       REVIEW OF SYSTEMS:                                                      10-point review of systems is negative except as mentioned above in HPI.     EXAM:                                                      Physical Exam:   Vitals: /87   Pulse 96   Ht 1.575 m (5' 2\")   Wt 52.3 kg (115 lb 3.2 oz)   BMI 21.07 kg/m    BMI= Body mass index is 21.07 kg/m .  GENERAL: NAD.  HEENT: NC/AT. No TTP of scalp/neck.  CV: RRR. S1, S2.   NECK: No bruits.  PULM: Non-labored breathing.   Neurologic:  MENTAL STATUS: Alert, attentive. Speech is fluent. Normal " comprehension. Normal concentration. Adequate fund of knowledge.   CRANIAL NERVES: Discs flat. Visual fields intact to confrontation. Pupils equally, round and reactive to light. Facial sensation and movement normal. EOM full. Hearing intact to conversation. Sternocleidomastoids and trapezius strength intact. Palate moves symmetrically. Tongue midline.  MOTOR: 5/5 in proximal and distal muscle groups of upper and lower extremities. Tone and bulk normal.   DTRs: Intact and symmetric in biceps, BR, patellae. Trace ankle jerks.  SENSATION: Normal light touch and pinprick. Intact proprioception at great toes. Vibration: Normal at both ankles.   COORDINATION: Normal finger nose finger. Finger tapping normal. Knee heel shin normal.  STATION AND GAIT: Romberg Negative.  Casual gait is normal.  Left hand-dominant.    ASSESSMENT and PLAN:                                                      Assessment:     ICD-10-CM    1. Intractable tension-type headache, unspecified chronicity pattern  G44.201 topiramate (TOPAMAX) 25 MG tablet           Ms. Lam is a pleasant 67-year-old with decades long problems with headaches.  The pain that she describes today really does not sound consistent with migraines.  I think she has persistent tension type headaches, now chronic given the frequency.  She has not tried Topamax for abortive therapy so I suggested we give this a try, with a titrating dose.  Potential side effects reviewed.  I would like to see Kailee back in clinic in about 6 months.  I asked her to keep me posted on her headaches in the meantime.  She understands and agrees with the plan.    Plan:  -- Let's do a trial of Topamax (topiramate) for headache prevention.  We will start with 25mg in the evening for 1 week, then 25mg twice daily for 1 week, then 25mg in the morning and 50mg in the evening for 1 week and finally 50mg twice daily.  -- Let me know how you are feeling after a couple of weeks on the 50mg twice daily  dosing.  We may need to adjust the dose further for effectiveness.  -- Minimize use of over-the-counter medications, as discussed.  It is great that you are doing a medication holiday.  -- Return to neurology clinic in 6 months.    Total Time: 45 minutes were spent with the patient and in chart review/documentation (as described above in Assessment and Plan) /coordinating the care on date of service.    Una Gardiner MD  Neurology    CC: Anastasiia Desai MD    Dragon software used in the dictation of this note.        Again, thank you for allowing me to participate in the care of your patient.        Sincerely,        Una Gardiner MD

## 2023-01-16 NOTE — PROGRESS NOTES
"INITIAL NEUROLOGY CONSULTATION    DATE OF VISIT: 1/16/2023  MRN: [id number]  PATIENT NAME: Kailee Lam  YOB: 1955    REFERRING PROVIDER: Referred Self    Chief Complaint   Patient presents with     migraines     No concerns       SUBJECTIVE:                                                      HPI:   Kailee Lam is a 67 year old female self-referred for headaches.  She previously followed with Dr. Cifuentes ().  Per chart review, she has daily headaches and migraine headaches.  Intractable, occurring more than 15 days/month though she was noted to have good response to Botox, more than 50% improvement.  Patient describes decreased severity of her pain but was still having frequent headaches so in June 2021 and she was started on Aimovig.  She has tried Effexor, gabapentin, propranolol, Pamelor and amitriptyline in the past without effective headache control/with side effects.  It looks like her most recent Botox treatment was in August of last year.    The headaches are still there. She says the Botox used to decrease the headache severity, but never really made a difference in terms of frequency.  She describes daily pressure-like headaches.  She says she has never had typical migraine headaches.  Her mother and maternal aunt have typical migraine symptoms.  She says that her her own headaches are pressure-like.  They often involve the top of the head and then migrate toward the forehead, sometimes involving a band around the entire head.  These can last all day though evenings tend to be best.  She denies aura's, does not have any light sensitivity.  She says it is just pressure that \"gets old.\"    She does not want to do injections, so she never tried the Aimovig.  Her  has cluster headaches and is on an injectable for this which works well for him.    She has not been taking any over the counter medications for about 3 weeks. She was previously taking quite a bit of over-the-counter " "medication, since her 20s.  She has noticed that the severity of her headaches has decreased in these past few weeks off of the over-the-counter medications.  She is aware of medication overuse/rebound headache.    She did do some physical therapy for her neck and this did not seem to help.  She does not think she tried Topamax.  She does recall when she was on one of the medications that is also an antidepressant that she became quite depressed.    No history of kidney stones.  She says that Dr. Cifuentes never did any imaging, did not feel that it was necessary based on her exam.  She says that Dr. 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    Past Medical History:   Diagnosis Date     Intractable migraine without aura and with status migrainosus      No past surgical history on file.    simvastatin (ZOCOR) 20 MG tablet,     No current facility-administered medications on file prior to visit.    No Known Allergies      Social History     Tobacco Use     Smoking status: Never     Smokeless tobacco: Never   Substance Use Topics     Alcohol use: Not Currently       REVIEW OF SYSTEMS:                                                      10-point review of systems is negative except as mentioned above in HPI.     EXAM:                                                      Physical Exam:   Vitals: /87   Pulse 96   Ht 1.575 m (5' 2\")   Wt 52.3 kg (115 lb 3.2 oz)   BMI 21.07 kg/m    BMI= Body mass index is 21.07 kg/m .  GENERAL: NAD.  HEENT: NC/AT. No TTP of scalp/neck.  CV: RRR. S1, S2.   NECK: No bruits.  PULM: Non-labored breathing.   Neurologic:  MENTAL STATUS: Alert, attentive. Speech is fluent. Normal comprehension. Normal concentration. Adequate fund of knowledge.   CRANIAL NERVES: Discs flat. Visual fields intact to confrontation. Pupils equally, round and reactive to light. Facial sensation and movement normal. EOM full. Hearing intact to conversation. " Sternocleidomastoids and trapezius strength intact. Palate moves symmetrically. Tongue midline.  MOTOR: 5/5 in proximal and distal muscle groups of upper and lower extremities. Tone and bulk normal.   DTRs: Intact and symmetric in biceps, BR, patellae. Trace ankle jerks.  SENSATION: Normal light touch and pinprick. Intact proprioception at great toes. Vibration: Normal at both ankles.   COORDINATION: Normal finger nose finger. Finger tapping normal. Knee heel shin normal.  STATION AND GAIT: Romberg Negative.  Casual gait is normal.  Left hand-dominant.    ASSESSMENT and PLAN:                                                      Assessment:     ICD-10-CM    1. Intractable tension-type headache, unspecified chronicity pattern  G44.201 topiramate (TOPAMAX) 25 MG tablet           Ms. Lam is a pleasant 67-year-old with decades long problems with headaches.  The pain that she describes today really does not sound consistent with migraines.  I think she has persistent tension type headaches, now chronic given the frequency.  She has not tried Topamax for abortive therapy so I suggested we give this a try, with a titrating dose.  Potential side effects reviewed.  I would like to see Kailee back in clinic in about 6 months.  I asked her to keep me posted on her headaches in the meantime.  She understands and agrees with the plan.    Plan:  -- Let's do a trial of Topamax (topiramate) for headache prevention.  We will start with 25mg in the evening for 1 week, then 25mg twice daily for 1 week, then 25mg in the morning and 50mg in the evening for 1 week and finally 50mg twice daily.  -- Let me know how you are feeling after a couple of weeks on the 50mg twice daily dosing.  We may need to adjust the dose further for effectiveness.  -- Minimize use of over-the-counter medications, as discussed.  It is great that you are doing a medication holiday.  -- Return to neurology clinic in 6 months.    Total Time: 45 minutes were  spent with the patient and in chart review/documentation (as described above in Assessment and Plan) /coordinating the care on date of service.    Una Gardiner MD  Neurology    CC: Anastasiia Desai MD    Dragon software used in the dictation of this note.

## 2023-01-16 NOTE — NURSING NOTE
Chief Complaint   Patient presents with     migraines     No concerns     Angela Vargas on 1/16/2023 at 8:06 AM

## 2023-01-16 NOTE — PATIENT INSTRUCTIONS
Plan:  -- Let's do a trial of Topamax (topiramate) for headache prevention.  We will start with 25mg in the evening for 1 week, then 25mg twice daily for 1 week, then 25mg in the morning and 50mg in the evening for 1 week and finally 50mg twice daily.  -- Let me know how you are feeling after a couple of weeks on the 50mg twice daily dosing.  We may need to adjust the dose further for effectiveness.  -- Minimize use of over-the-counter medications, as discussed.  It is great that you are doing a medication holiday.  -- Return to neurology clinic in 6 months.

## 2023-01-29 ENCOUNTER — HEALTH MAINTENANCE LETTER (OUTPATIENT)
Age: 68
End: 2023-01-29

## 2023-07-20 ENCOUNTER — OFFICE VISIT (OUTPATIENT)
Dept: NEUROLOGY | Facility: CLINIC | Age: 68
End: 2023-07-20
Payer: COMMERCIAL

## 2023-07-20 VITALS
SYSTOLIC BLOOD PRESSURE: 145 MMHG | DIASTOLIC BLOOD PRESSURE: 92 MMHG | BODY MASS INDEX: 21.16 KG/M2 | HEART RATE: 83 BPM | WEIGHT: 115 LBS | HEIGHT: 62 IN

## 2023-07-20 DIAGNOSIS — G44.201 INTRACTABLE TENSION-TYPE HEADACHE, UNSPECIFIED CHRONICITY PATTERN: Primary | ICD-10-CM

## 2023-07-20 PROCEDURE — 99213 OFFICE O/P EST LOW 20 MIN: CPT | Performed by: PSYCHIATRY & NEUROLOGY

## 2023-07-20 RX ORDER — DIVALPROEX SODIUM 500 MG/1
500 TABLET, DELAYED RELEASE ORAL EVERY EVENING
Qty: 30 TABLET | Refills: 5 | Status: SHIPPED | OUTPATIENT
Start: 2023-07-20 | End: 2024-05-02

## 2023-07-20 NOTE — PROGRESS NOTES
"ESTABLISHED PATIENT NEUROLOGY NOTE    DATE OF VISIT: 7/20/2023  MRN: [id number]  PATIENT NAME: Kailee Lam  YOB: 1955    Chief Complaint   Patient presents with    Headache     Patient reports medication is not working, she was having side effects.     SUBJECTIVE:                                                      HISTORY OF PRESENT ILLNESS:  Kailee is here for follow up regarding mixed daily and migraine headaches.  I met her for initial consultation about 6 months ago.    History as previously documented by me (1.16.23):   She previously followed with Dr. Cifuentes ().  Per chart review, she has daily headaches and migraine headaches.  Intractable, occurring more than 15 days/month though she was noted to have good response to Botox, more than 50% improvement.  Patient describes decreased severity of her pain but was still having frequent headaches so in June 2021 and she was started on Aimovig.  She has tried Effexor, gabapentin, propranolol, Pamelor and amitriptyline in the past without effective headache control/with side effects.  It looks like her most recent Botox treatment was in August of last year.     The headaches are still there. She says the Botox used to decrease the headache severity, but never really made a difference in terms of frequency.  She describes daily pressure-like headaches.  She says she has never had typical migraine headaches.  Her mother and maternal aunt have typical migraine symptoms.  She says that her her own headaches are pressure-like.  They often involve the top of the head and then migrate toward the forehead, sometimes involving a band around the entire head.  These can last all day though evenings tend to be best.  She denies aura's, does not have any light sensitivity.  She says it is just pressure that \"gets old.\"     She does not want to do injections, so she never tried the Aimovig.  Her  has cluster headaches and is on an injectable for this which " works well for him.     She has not been taking any over the counter medications for about 3 weeks. She was previously taking quite a bit of over-the-counter medication, since her 20s.  She has noticed that the severity of her headaches has decreased in these past few weeks off of the over-the-counter medications.  She is aware of medication overuse/rebound headache.     She did do some physical therapy for her neck and this did not seem to help.  She does not think she tried Topamax.  She does recall when she was on one of the medications that is also an antidepressant that she became quite depressed.     No history of kidney stones.  She says that Dr. Cifuentes never did any imaging, did not feel that it was necessary based on her exam.  She says that Dr. 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     I recommended a trial of Topamax for headache prevention.  We also discussed avoiding analgesic overuse headaches.  She says she stopped the Topamax because it made her feel foggy. She says she was forgetting simple things like to bring her shoes to the gym.  She says she felt best when she was taking 1 in the morning and 1 in the evening in terms of headache prevention.  She asks if there is something similar that we could try that would not have the cognitive side effects.    Kailee still has the pressure-like headaches every day.   She is avoiding the over the counter medications for the most part.  She took Tylenol last Friday for a bad headache, but then prior to that it had been a few weeks.  She has noticed that when she takes the Tylenol regularly it is less effective and resolving her headaches.    CURRENT MEDICATIONS:   simvastatin (ZOCOR) 20 MG tablet,   topiramate (TOPAMAX) 25 MG tablet, 25mg in the evening for 1 week, then 25mg twice daily for 1 week, then 25mg in the morning and 50mg in the evening for 1 week and finally 50mg twice daily (Patient not taking:  "Reported on 7/20/2023)    No current facility-administered medications on file prior to visit.      RECENT DIAGNOSTIC STUDIES:   Labs: No results found for any visits on 07/20/23.    REVIEW OF SYSTEMS:                                                      10-point review of systems is negative except as mentioned above in HPI.    EXAM:                                                      Physical Exam:   Vitals: BP (!) 145/92   Pulse 83   Ht 1.575 m (5' 2\")   Wt 52.2 kg (115 lb)   BMI 21.03 kg/m    BMI= Body mass index is 21.03 kg/m .  GENERAL: NAD.  HEENT: NC/AT.  No tenderness to palpation of scalp or neck.  CV: RRR. S1, S2.   NECK: No bruits.  PULM: Non-labored breathing.   Neurologic:  MENTAL STATUS: Alert, attentive. Speech is fluent. Normal comprehension. Normal concentration. Adequate fund of knowledge.   CRANIAL NERVES: Discs technically difficult to visualize. Visual fields intact to confrontation. Pupils equally, round and reactive to light. Facial sensation and movement normal. EOM full. Hearing intact to conversation.Trapezius strength intact. Palate moves symmetrically. Tongue midline.  MOTOR: 5/5 in proximal and distal muscle groups of upper and lower extremities. Tone and bulk normal.   DTRs: Intact and symmetric in biceps, BR, patellae. Trace ankle jerks.  SENSATION: Normal light touch throughout.  COORDINATION: Normal fine motor movements of the hands.  STATION AND GAIT: Casual gait is normal.  Left hand-dominant.    ASSESSMENT and PLAN:                                                      Assessment:    ICD-10-CM    1. Intractable tension-type headache, unspecified chronicity pattern  G44.201           Ms. aLm is a pleasant 68-year-old woman here for follow-up regarding chronic daily headache.  Again, her headaches seem to be most consistent with tension type headaches.  Severity varies.  Because she is having such frequent pain, I do think that trying another medication for prevention is " appropriate.  She had some headache improvement with the Topamax but unfortunately experienced side effects on this.  We discussed doing a trial of Depakote for headache prevention.  We will start with just an evening dose.  Potential side effects were reviewed.  She is doing a good job of avoiding regular use of over-the-counter analgesics.  I would like to see Kailee back in clinic again in 6 months.  She expressed understanding and agreement with the plan.    Plan:  -- Let's do a trial of Depakote: 500mg in the evening.  -- As we discussed, feel free to keep us posted on your progress with the new medication.  We can adjust the dosing if needed.  -- Return to neurology clinic in 6 months.    Total Time: 20 minutes were spent with the patient and in chart review/documentation (as described above in Assessment and Plan)/coordinating the care on date of service.    Una Gardiner MD  Neurology    Dragon software used in the dictation of this note.

## 2023-07-20 NOTE — LETTER
7/20/2023         RE: Kailee Lam  [address] [facility] [address]        Dear Colleague,    Thank you for referring your patient, Kailee Lam, to the [facility] NEUROLOGY CLINIC [place]. Please see a copy of my visit note below.    ESTABLISHED PATIENT NEUROLOGY NOTE    DATE OF VISIT: 7/20/2023  MRN: [id number]  PATIENT NAME: Kailee Lam  YOB: 1955    Chief Complaint   Patient presents with     Headache     Patient reports medication is not working, she was having side effects.     SUBJECTIVE:                                                      HISTORY OF PRESENT ILLNESS:  Kailee is here for follow up regarding mixed daily and migraine headaches.  I met her for initial consultation about 6 months ago.    History as previously documented by me (1.16.23):   She previously followed with Dr. Cifuentes ().  Per chart review, she has daily headaches and migraine headaches.  Intractable, occurring more than 15 days/month though she was noted to have good response to Botox, more than 50% improvement.  Patient describes decreased severity of her pain but was still having frequent headaches so in June 2021 and she was started on Aimovig.  She has tried Effexor, gabapentin, propranolol, Pamelor and amitriptyline in the past without effective headache control/with side effects.  It looks like her most recent Botox treatment was in August of last year.     The headaches are still there. She says the Botox used to decrease the headache severity, but never really made a difference in terms of frequency.  She describes daily pressure-like headaches.  She says she has never had typical migraine headaches.  Her mother and maternal aunt have typical migraine symptoms.  She says that her her own headaches are pressure-like.  They often involve the top of the head and then migrate toward the forehead, sometimes involving a band around the entire head.  These can last all day though evenings  "tend to be best.  She denies aura's, does not have any light sensitivity.  She says it is just pressure that \"gets old.\"     She does not want to do injections, so she never tried the Aimovig.  Her  has cluster headaches and is on an injectable for this which works well for him.     She has not been taking any over the counter medications for about 3 weeks. She was previously taking quite a bit of over-the-counter medication, since her 20s.  She has noticed that the severity of her headaches has decreased in these past few weeks off of the over-the-counter medications.  She is aware of medication overuse/rebound headache.     She did do some physical therapy for her neck and this did not seem to help.  She does not think she tried Topamax.  She does recall when she was on one of the medications that is also an antidepressant that she became quite depressed.     No history of kidney stones.  She says that Dr. Cifuentes never did any imaging, did not feel that it was necessary based on her exam.  She says that Dr. 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     I recommended a trial of Topamax for headache prevention.  We also discussed avoiding analgesic overuse headaches.  She says she stopped the Topamax because it made her feel foggy. She says she was forgetting simple things like to bring her shoes to the gym.  She says she felt best when she was taking 1 in the morning and 1 in the evening in terms of headache prevention.  She asks if there is something similar that we could try that would not have the cognitive side effects.    Kailee still has the pressure-like headaches every day.   She is avoiding the over the counter medications for the most part.  She took Tylenol last Friday for a bad headache, but then prior to that it had been a few weeks.  She has noticed that when she takes the Tylenol regularly it is less effective and resolving her " "headaches.    CURRENT MEDICATIONS:   simvastatin (ZOCOR) 20 MG tablet,   topiramate (TOPAMAX) 25 MG tablet, 25mg in the evening for 1 week, then 25mg twice daily for 1 week, then 25mg in the morning and 50mg in the evening for 1 week and finally 50mg twice daily (Patient not taking: Reported on 7/20/2023)    No current facility-administered medications on file prior to visit.      RECENT DIAGNOSTIC STUDIES:   Labs: No results found for any visits on 07/20/23.    REVIEW OF SYSTEMS:                                                      10-point review of systems is negative except as mentioned above in HPI.    EXAM:                                                      Physical Exam:   Vitals: BP (!) 145/92   Pulse 83   Ht 1.575 m (5' 2\")   Wt 52.2 kg (115 lb)   BMI 21.03 kg/m    BMI= Body mass index is 21.03 kg/m .  GENERAL: NAD.  HEENT: NC/AT.  No tenderness to palpation of scalp or neck.  CV: RRR. S1, S2.   NECK: No bruits.  PULM: Non-labored breathing.   Neurologic:  MENTAL STATUS: Alert, attentive. Speech is fluent. Normal comprehension. Normal concentration. Adequate fund of knowledge.   CRANIAL NERVES: Discs technically difficult to visualize. Visual fields intact to confrontation. Pupils equally, round and reactive to light. Facial sensation and movement normal. EOM full. Hearing intact to conversation.Trapezius strength intact. Palate moves symmetrically. Tongue midline.  MOTOR: 5/5 in proximal and distal muscle groups of upper and lower extremities. Tone and bulk normal.   DTRs: Intact and symmetric in biceps, BR, patellae. Trace ankle jerks.  SENSATION: Normal light touch throughout.  COORDINATION: Normal fine motor movements of the hands.  STATION AND GAIT: Casual gait is normal.  Left hand-dominant.    ASSESSMENT and PLAN:                                                      Assessment:    ICD-10-CM    1. Intractable tension-type headache, unspecified chronicity pattern  G44.201           Ms. Lam is " a pleasant 68-year-old woman here for follow-up regarding chronic daily headache.  Again, her headaches seem to be most consistent with tension type headaches.  Severity varies.  Because she is having such frequent pain, I do think that trying another medication for prevention is appropriate.  She had some headache improvement with the Topamax but unfortunately experienced side effects on this.  We discussed doing a trial of Depakote for headache prevention.  We will start with just an evening dose.  Potential side effects were reviewed.  She is doing a good job of avoiding regular use of over-the-counter analgesics.  I would like to see Kailee back in clinic again in 6 months.  She expressed understanding and agreement with the plan.    Plan:  -- Let's do a trial of Depakote: 500mg in the evening.  -- As we discussed, feel free to keep us posted on your progress with the new medication.  We can adjust the dosing if needed.  -- Return to neurology clinic in 6 months.    Total Time: 20 minutes were spent with the patient and in chart review/documentation (as described above in Assessment and Plan)/coordinating the care on date of service.    Una Gardiner MD  Neurology    Dragon software used in the dictation of this note.                    Again, thank you for allowing me to participate in the care of your patient.        Sincerely,        Una Gardiner MD

## 2023-07-20 NOTE — NURSING NOTE
Chief Complaint   Patient presents with     Headache     Patient reports medication is not working, she was having side effects.     Angela Vargas on 7/20/2023 at 8:33 AM

## 2023-07-20 NOTE — PATIENT INSTRUCTIONS
Plan:  -- Let's do a trial of Depakote: 500mg in the evening.  -- As we discussed, feel free to keep us posted on your progress with the new medication.  We can adjust the dosing if needed.  -- Return to neurology clinic in 6 months.

## 2024-02-25 ENCOUNTER — HEALTH MAINTENANCE LETTER (OUTPATIENT)
Age: 69
End: 2024-02-25

## 2024-05-02 ENCOUNTER — OFFICE VISIT (OUTPATIENT)
Dept: NEUROLOGY | Facility: CLINIC | Age: 69
End: 2024-05-02
Payer: COMMERCIAL

## 2024-05-02 VITALS
BODY MASS INDEX: 20.08 KG/M2 | SYSTOLIC BLOOD PRESSURE: 112 MMHG | WEIGHT: 109.8 LBS | HEART RATE: 87 BPM | DIASTOLIC BLOOD PRESSURE: 70 MMHG

## 2024-05-02 DIAGNOSIS — Z79.899 ENCOUNTER FOR LONG-TERM (CURRENT) USE OF MEDICATIONS: ICD-10-CM

## 2024-05-02 DIAGNOSIS — G44.201 INTRACTABLE TENSION-TYPE HEADACHE, UNSPECIFIED CHRONICITY PATTERN: Primary | ICD-10-CM

## 2024-05-02 PROCEDURE — 99213 OFFICE O/P EST LOW 20 MIN: CPT | Performed by: PSYCHIATRY & NEUROLOGY

## 2024-05-02 RX ORDER — DIVALPROEX SODIUM 500 MG/1
1000 TABLET, DELAYED RELEASE ORAL EVERY EVENING
Qty: 60 TABLET | Refills: 5 | Status: SHIPPED | OUTPATIENT
Start: 2024-05-02

## 2024-05-02 RX ORDER — ALENDRONATE SODIUM 70 MG/1
TABLET ORAL
COMMUNITY

## 2024-05-02 RX ORDER — ATORVASTATIN CALCIUM 40 MG/1
TABLET, FILM COATED ORAL
COMMUNITY

## 2024-05-02 RX ORDER — LISINOPRIL 5 MG/1
TABLET ORAL
COMMUNITY

## 2024-05-02 NOTE — PATIENT INSTRUCTIONS
Plan:  Let's try increasing the Depakote to 1000mg in the evening.  I am hoping this provides you additional headache relief.  Continue the limited as needed over-the-counter medications, as we discussed.  Return to neurology clinic in 6 months.  Please let us know if any concerns arise in the meantime.

## 2024-05-02 NOTE — LETTER
5/2/2024         RE: Kailee Lam  [address] [facility] [address]        Dear Colleague,    Thank you for referring your patient, Kailee Lam, to the [facility] NEUROLOGY CLINIC [place]. Please see a copy of my visit note below.    ESTABLISHED PATIENT NEUROLOGY NOTE    DATE OF VISIT: 5/2/2024  MRN: [id number]  PATIENT NAME: Kailee Lam  YOB: 1955    Chief Complaint   Patient presents with     Headache     No concerns   No longer taking topiramate; causes patient to be nervous and forgetful     SUBJECTIVE:                                                      HISTORY OF PRESENT ILLNESS:  Kailee is here for follow up regarding headaches.     She has mixed daily and migraine headaches. We tried Topamax but she had side effects on this. I recommended Depakote.  Kailee says her headaches are still occurring every day. However, they are less intense since we started the Depakote.  She denies any side effects on this.      She says that she cycles with the over the counter medications.  She describes it as doing well until something happens that triggers more headache.  She fell in January and injured her Right cheek. She tapered off of the OTCs in January, after being in a cycle of using these regularly after the injury.  She still has a little bit of facial pain.     No major changes in vision, she had this checked recently and there was little change to her prescription.    No new concerns otherwise.    CURRENT MEDICATIONS:   Current Outpatient Medications   Medication Sig Dispense Refill     alendronate (FOSAMAX) 70 MG tablet Take 1 Tablet (70 mg) by mouth once every week. Take 30 minutes before first food-drink-medication. Avoid lying down for 30 minutes.*       atorvastatin (LIPITOR) 40 MG tablet Take 1 Tablet (40 mg) by mouth daily.*       Calcium Carbonate-Vitamin D 250-3.125 MG-MCG TABS Take 1 Tablet (250 mg elemental Ca) by mouth daily.       divalproex sodium  delayed-release (DEPAKOTE) 500 MG DR tablet Take 2 tablets (1,000 mg) by mouth every evening 60 tablet 5     lisinopril (ZESTRIL) 5 MG tablet Take 1 Tablet (5 mg) by mouth daily.*       simvastatin (ZOCOR) 20 MG tablet  (Patient not taking: Reported on 5/2/2024)       topiramate (TOPAMAX) 25 MG tablet 25mg in the evening for 1 week, then 25mg twice daily for 1 week, then 25mg in the morning and 50mg in the evening for 1 week and finally 50mg twice daily (Patient not taking: Reported on 7/20/2023) 120 tablet 5     No current facility-administered medications for this visit.       RECENT DIAGNOSTIC STUDIES:   Labs: No results found for any visits on 05/02/24.    REVIEW OF SYSTEMS:                                                      10-point review of systems is negative except as mentioned above in HPI.    EXAM:                                                      Physical Exam:   Vitals: /70   Pulse 87   Wt 49.8 kg (109 lb 12.8 oz)   BMI 20.08 kg/m    BMI= Body mass index is 20.08 kg/m .  GENERAL: NAD.  HEENT: NC/AT.  CV: RRR. S1, S2.   NECK: No bruits.  PULM: Non-labored breathing.   Neurologic:  MENTAL STATUS: Alert, attentive. Speech is fluent. Normal comprehension. Normal concentration. Adequate fund of knowledge.   CRANIAL NERVES: Discs technically difficult to visualize. Visual fields intact to confrontation. Pupils equally, round and reactive to light. Facial sensation and movement normal. EOM full. Hearing intact to conversation. Trapezius strength intact. Palate moves symmetrically. Tongue midline.  MOTOR: 5/5 in proximal and distal muscle groups of upper and lower extremities. Tone and bulk normal.   DTRs: Intact and symmetric in biceps, BR, patellae. Trace ankle jerks.  SENSATION: Normal light touch throughout.  COORDINATION: Normal fine motor movements of the hands.  STATION AND GAIT: Casual gait is normal.  Left hand-dominant.    ASSESSMENT and PLAN:                                                       Assessment:    ICD-10-CM    1. Intractable tension-type headache, unspecified chronicity pattern  G44.201       2. Encounter for long-term (current) use of medications  Z79.899           Ms. Lam is a pleasant 68-year-old woman with mixed headaches.  She feels like the headaches have improved at least in terms of intensity since we started the Depakote.  They still occur daily to some degree.  Because she is tolerating it well I suggested we increase her Depakote dose and see if we can get additional benefit from it.  Kailee expressed understanding and agreement with the plan.    Plan:  Let's try increasing the Depakote to 1000mg in the evening.  I am hoping this provides you additional headache relief.  Continue the limited as needed over-the-counter medications, as we discussed.  Return to neurology clinic in 6 months.  Please let us know if any concerns arise in the meantime.    Total Time: 20 minutes were spent with the patient and in chart review/documentation (as described above in Assessment and Plan)/coordinating the care on date of service.    Una Gardiner MD  Neurology    Dragon software used in the dictation of this note.                    Again, thank you for allowing me to participate in the care of your patient.        Sincerely,        Una Gardiner MD

## 2024-05-02 NOTE — PROGRESS NOTES
ESTABLISHED PATIENT NEUROLOGY NOTE    DATE OF VISIT: 5/2/2024  MRN: [id number]  PATIENT NAME: Kailee Lam  YOB: 1955    Chief Complaint   Patient presents with    Headache     No concerns   No longer taking topiramate; causes patient to be nervous and forgetful     SUBJECTIVE:                                                      HISTORY OF PRESENT ILLNESS:  Kailee is here for follow up regarding headaches.     She has mixed daily and migraine headaches. We tried Topamax but she had side effects on this. I recommended Depakote.  Kailee says her headaches are still occurring every day. However, they are less intense since we started the Depakote.  She denies any side effects on this.      She says that she cycles with the over the counter medications.  She describes it as doing well until something happens that triggers more headache.  She fell in January and injured her Right cheek. She tapered off of the OTCs in January, after being in a cycle of using these regularly after the injury.  She still has a little bit of facial pain.     No major changes in vision, she had this checked recently and there was little change to her prescription.    No new concerns otherwise.    CURRENT MEDICATIONS:   Current Outpatient Medications   Medication Sig Dispense Refill    alendronate (FOSAMAX) 70 MG tablet Take 1 Tablet (70 mg) by mouth once every week. Take 30 minutes before first food-drink-medication. Avoid lying down for 30 minutes.*      atorvastatin (LIPITOR) 40 MG tablet Take 1 Tablet (40 mg) by mouth daily.*      Calcium Carbonate-Vitamin D 250-3.125 MG-MCG TABS Take 1 Tablet (250 mg elemental Ca) by mouth daily.      divalproex sodium delayed-release (DEPAKOTE) 500 MG DR tablet Take 2 tablets (1,000 mg) by mouth every evening 60 tablet 5    lisinopril (ZESTRIL) 5 MG tablet Take 1 Tablet (5 mg) by mouth daily.*      simvastatin (ZOCOR) 20 MG tablet  (Patient not taking: Reported on 5/2/2024)       topiramate (TOPAMAX) 25 MG tablet 25mg in the evening for 1 week, then 25mg twice daily for 1 week, then 25mg in the morning and 50mg in the evening for 1 week and finally 50mg twice daily (Patient not taking: Reported on 7/20/2023) 120 tablet 5     No current facility-administered medications for this visit.       RECENT DIAGNOSTIC STUDIES:   Labs: No results found for any visits on 05/02/24.    REVIEW OF SYSTEMS:                                                      10-point review of systems is negative except as mentioned above in HPI.    EXAM:                                                      Physical Exam:   Vitals: /70   Pulse 87   Wt 49.8 kg (109 lb 12.8 oz)   BMI 20.08 kg/m    BMI= Body mass index is 20.08 kg/m .  GENERAL: NAD.  HEENT: NC/AT.  CV: RRR. S1, S2.   NECK: No bruits.  PULM: Non-labored breathing.   Neurologic:  MENTAL STATUS: Alert, attentive. Speech is fluent. Normal comprehension. Normal concentration. Adequate fund of knowledge.   CRANIAL NERVES: Discs technically difficult to visualize. Visual fields intact to confrontation. Pupils equally, round and reactive to light. Facial sensation and movement normal. EOM full. Hearing intact to conversation. Trapezius strength intact. Palate moves symmetrically. Tongue midline.  MOTOR: 5/5 in proximal and distal muscle groups of upper and lower extremities. Tone and bulk normal.   DTRs: Intact and symmetric in biceps, BR, patellae. Trace ankle jerks.  SENSATION: Normal light touch throughout.  COORDINATION: Normal fine motor movements of the hands.  STATION AND GAIT: Casual gait is normal.  Left hand-dominant.    ASSESSMENT and PLAN:                                                      Assessment:    ICD-10-CM    1. Intractable tension-type headache, unspecified chronicity pattern  G44.201       2. Encounter for long-term (current) use of medications  Z79.899           Ms. Lam is a pleasant 68-year-old woman with mixed headaches.  She  feels like the headaches have improved at least in terms of intensity since we started the Depakote.  They still occur daily to some degree.  Because she is tolerating it well I suggested we increase her Depakote dose and see if we can get additional benefit from it.  Kailee expressed understanding and agreement with the plan.    Plan:  Let's try increasing the Depakote to 1000mg in the evening.  I am hoping this provides you additional headache relief.  Continue the limited as needed over-the-counter medications, as we discussed.  Return to neurology clinic in 6 months.  Please let us know if any concerns arise in the meantime.    Total Time: 20 minutes were spent with the patient and in chart review/documentation (as described above in Assessment and Plan)/coordinating the care on date of service.    Una Gardiner MD  Neurology    Dragon software used in the dictation of this note.

## 2024-12-05 ENCOUNTER — OFFICE VISIT (OUTPATIENT)
Dept: NEUROLOGY | Facility: CLINIC | Age: 69
End: 2024-12-05
Payer: COMMERCIAL

## 2024-12-05 VITALS
SYSTOLIC BLOOD PRESSURE: 135 MMHG | DIASTOLIC BLOOD PRESSURE: 79 MMHG | WEIGHT: 111.8 LBS | HEART RATE: 83 BPM | BODY MASS INDEX: 20.45 KG/M2

## 2024-12-05 DIAGNOSIS — Z79.899 ENCOUNTER FOR LONG-TERM (CURRENT) USE OF MEDICATIONS: ICD-10-CM

## 2024-12-05 DIAGNOSIS — G43.909 MIXED MIGRAINE AND MUSCLE CONTRACTION HEADACHE: Primary | ICD-10-CM

## 2024-12-05 DIAGNOSIS — G44.201 INTRACTABLE TENSION-TYPE HEADACHE, UNSPECIFIED CHRONICITY PATTERN: ICD-10-CM

## 2024-12-05 DIAGNOSIS — G44.209 MIXED MIGRAINE AND MUSCLE CONTRACTION HEADACHE: Primary | ICD-10-CM

## 2024-12-05 NOTE — LETTER
12/5/2024      Kailee Lam  [address]  [facility] [address]      Dear Colleague,    Thank you for referring your patient, Kailee Lam, to the [facility] NEUROLOGY CLINIC [place]. Please see a copy of my visit note below.    ESTABLISHED PATIENT NEUROLOGY NOTE    DATE OF VISIT: 12/5/2024  MRN: [id number]  PATIENT NAME: Kailee Lam  YOB: 1955    Chief Complaint   Patient presents with     Headache     Headache has been stable     SUBJECTIVE:                                                      HISTORY OF PRESENT ILLNESS:  Kailee is here for follow up regarding headaches.    History as previously documented by me (5.2.24):  She has mixed daily and migraine headaches. We tried Topamax but she had side effects on this. I recommended Depakote.  Kailee says her headaches are still occurring every day. However, they are less intense since we started the Depakote.  She denies any side effects on this.       She says that she cycles with the over the counter medications.  She describes it as doing well until something happens that triggers more headache.  She fell in January and injured her Right cheek. She tapered off of the OTCs in January, after being in a cycle of using these regularly after the injury.  She still has a little bit of facial pain.      No major changes in vision, she had this checked recently and there was little change to her prescription.     No new concerns otherwise.    Our plan was to increase her Depakote to 1000mg in the evening for further headache relief.  She is doing well. She says the headaches are less frequent. The headaches no longer last all day, less intense in general.  No problems with side effects on the Depakote.  She takes Tylenol as needed, occasionally.     She takes care of her grandchild and grand dog periodically.    CURRENT MEDICATIONS:   Current Outpatient Medications   Medication Sig Dispense Refill     alendronate (FOSAMAX) 70 MG  tablet Take 1 Tablet (70 mg) by mouth once every week. Take 30 minutes before first food-drink-medication. Avoid lying down for 30 minutes.*       atorvastatin (LIPITOR) 40 MG tablet Take 1 Tablet (40 mg) by mouth daily.*       Calcium Carbonate-Vitamin D 250-3.125 MG-MCG TABS Take 1 Tablet (250 mg elemental Ca) by mouth daily.       divalproex sodium delayed-release (DEPAKOTE) 500 MG DR tablet Take 2 tablets (1,000 mg) by mouth every evening 60 tablet 5     lisinopril (ZESTRIL) 5 MG tablet Take 1 Tablet (5 mg) by mouth daily.*       simvastatin (ZOCOR) 20 MG tablet  (Patient not taking: Reported on 12/5/2024)       No current facility-administered medications for this visit.       RECENT DIAGNOSTIC STUDIES:   Labs: No results found for any visits on 12/05/24.    REVIEW OF SYSTEMS:                                                      10-point review of systems is negative except as mentioned above in HPI.    EXAM:                                                      Physical Exam:   Vitals: /79   Pulse 83   Wt 50.7 kg (111 lb 12.8 oz)   BMI 20.45 kg/m    BMI= Body mass index is 20.45 kg/m .  GENERAL: NAD.  HEENT: NC/AT.  PULM: Non-labored breathing.   Neurologic:  MENTAL STATUS: Alert, attentive. Speech is fluent. Normal comprehension. Normal concentration. Adequate fund of knowledge.   CRANIAL NERVES: Discs technically difficult to visualize. Visual fields intact to confrontation. Pupils equally, round and reactive to light. Facial sensation and movement normal. EOM full. Hearing intact to conversation. Trapezius strength intact. Palate moves symmetrically. Tongue midline.  MOTOR: 5/5 in proximal and distal muscle groups of upper and lower extremities. Tone and bulk normal.   DTRs: Intact and symmetric in biceps, BR, patellae. Trace ankle jerks.  SENSATION: Normal light touch throughout.  COORDINATION: Normal fine motor movements of the hands.  STATION AND GAIT: Casual gait is normal.  Left  hand-dominant.    ASSESSMENT and PLAN:                                                      Assessment and Plan:    ICD-10-CM    1. Mixed migraine and muscle contraction headache  G43.909     G44.209       2. Intractable tension-type headache, unspecified chronicity pattern  G44.201       3. Encounter for long-term (current) use of medications  Z79.899           Ms. Lam is a pleasant 69-year-old woman here for follow-up regarding mixed headaches.  She is doing well on Depakote.  She takes Tylenol as needed, no concerns about medication overuse at this time.  We will not make any changes and plan to follow-up again in 1 year as long as Kailee remains stable.  She expressed understanding and agreement with the plan.    Total Time: 22 minutes were spent with the patient and in chart review/documentation (as described above in Assessment and Plan)/coordinating the care on date of service.    Una Gardiner MD  Neurology    Dragon software used in the dictation of this note.                    Again, thank you for allowing me to participate in the care of your patient.        Sincerely,        Una Gardiner MD

## 2024-12-05 NOTE — NURSING NOTE
"Kailee Lam is a 69 year old female who presents for:  Chief Complaint   Patient presents with    Headache     Headache has been stable        Initial Vitals:  /79   Pulse 83   Wt 50.7 kg (111 lb 12.8 oz)   BMI 20.45 kg/m   Estimated body mass index is 20.45 kg/m  as calculated from the following:    Height as of 7/20/23: 1.575 m (5' 2\").    Weight as of this encounter: 50.7 kg (111 lb 12.8 oz).. Body surface area is 1.49 meters squared. BP completed using cuff size: wrist cuff    Jacinto Chisholm  "

## 2024-12-05 NOTE — PROGRESS NOTES
ESTABLISHED PATIENT NEUROLOGY NOTE    DATE OF VISIT: 12/5/2024  MRN: [id number]  PATIENT NAME: Kailee Lam  YOB: 1955    Chief Complaint   Patient presents with    Headache     Headache has been stable     SUBJECTIVE:                                                      HISTORY OF PRESENT ILLNESS:  Kailee is here for follow up regarding headaches.    History as previously documented by me (5.2.24):  She has mixed daily and migraine headaches. We tried Topamax but she had side effects on this. I recommended Depakote.  Kailee says her headaches are still occurring every day. However, they are less intense since we started the Depakote.  She denies any side effects on this.       She says that she cycles with the over the counter medications.  She describes it as doing well until something happens that triggers more headache.  She fell in January and injured her Right cheek. She tapered off of the OTCs in January, after being in a cycle of using these regularly after the injury.  She still has a little bit of facial pain.      No major changes in vision, she had this checked recently and there was little change to her prescription.     No new concerns otherwise.    Our plan was to increase her Depakote to 1000mg in the evening for further headache relief.  She is doing well. She says the headaches are less frequent. The headaches no longer last all day, less intense in general.  No problems with side effects on the Depakote.  She takes Tylenol as needed, occasionally.     She takes care of her grandchild and grand dog periodically.    CURRENT MEDICATIONS:   Current Outpatient Medications   Medication Sig Dispense Refill    alendronate (FOSAMAX) 70 MG tablet Take 1 Tablet (70 mg) by mouth once every week. Take 30 minutes before first food-drink-medication. Avoid lying down for 30 minutes.*      atorvastatin (LIPITOR) 40 MG tablet Take 1 Tablet (40 mg) by mouth daily.*      Calcium Carbonate-Vitamin D  250-3.125 MG-MCG TABS Take 1 Tablet (250 mg elemental Ca) by mouth daily.      divalproex sodium delayed-release (DEPAKOTE) 500 MG DR tablet Take 2 tablets (1,000 mg) by mouth every evening 60 tablet 5    lisinopril (ZESTRIL) 5 MG tablet Take 1 Tablet (5 mg) by mouth daily.*      simvastatin (ZOCOR) 20 MG tablet  (Patient not taking: Reported on 12/5/2024)       No current facility-administered medications for this visit.       RECENT DIAGNOSTIC STUDIES:   Labs: No results found for any visits on 12/05/24.    REVIEW OF SYSTEMS:                                                      10-point review of systems is negative except as mentioned above in HPI.    EXAM:                                                      Physical Exam:   Vitals: /79   Pulse 83   Wt 50.7 kg (111 lb 12.8 oz)   BMI 20.45 kg/m    BMI= Body mass index is 20.45 kg/m .  GENERAL: NAD.  HEENT: NC/AT.  PULM: Non-labored breathing.   Neurologic:  MENTAL STATUS: Alert, attentive. Speech is fluent. Normal comprehension. Normal concentration. Adequate fund of knowledge.   CRANIAL NERVES: Discs technically difficult to visualize. Visual fields intact to confrontation. Pupils equally, round and reactive to light. Facial sensation and movement normal. EOM full. Hearing intact to conversation. Trapezius strength intact. Palate moves symmetrically. Tongue midline.  MOTOR: 5/5 in proximal and distal muscle groups of upper and lower extremities. Tone and bulk normal.   DTRs: Intact and symmetric in biceps, BR, patellae. Trace ankle jerks.  SENSATION: Normal light touch throughout.  COORDINATION: Normal fine motor movements of the hands.  STATION AND GAIT: Casual gait is normal.  Left hand-dominant.    ASSESSMENT and PLAN:                                                      Assessment and Plan:    ICD-10-CM    1. Mixed migraine and muscle contraction headache  G43.909     G44.209       2. Intractable tension-type headache, unspecified chronicity pattern   G44.201       3. Encounter for long-term (current) use of medications  Z79.899           Ms. Lam is a pleasant 69-year-old woman here for follow-up regarding mixed headaches.  She is doing well on Depakote.  She takes Tylenol as needed, no concerns about medication overuse at this time.  We will not make any changes and plan to follow-up again in 1 year as long as Kailee remains stable.  She expressed understanding and agreement with the plan.    Total Time: 22 minutes were spent with the patient and in chart review/documentation (as described above in Assessment and Plan)/coordinating the care on date of service.    Una Gardiner MD  Neurology    Dragon software used in the dictation of this note.

## 2025-03-15 ENCOUNTER — HEALTH MAINTENANCE LETTER (OUTPATIENT)
Age: 70
End: 2025-03-15

## 2025-03-25 NOTE — H&P
The History and Physical has been reviewed, the patient has been examined and no changes have occurred in the patient's condition since the H & P was completed.     Ekaterina Ramos MD

## 2025-03-27 RX ORDER — METFORMIN HYDROCHLORIDE 500 MG/1
500 TABLET, EXTENDED RELEASE ORAL
COMMUNITY

## 2025-03-30 ENCOUNTER — ANESTHESIA EVENT (OUTPATIENT)
Dept: SURGERY | Facility: HOSPITAL | Age: 70
End: 2025-03-30
Payer: COMMERCIAL

## 2025-03-31 ENCOUNTER — HOSPITAL ENCOUNTER (INPATIENT)
Facility: HOSPITAL | Age: 70
LOS: 3 days | Discharge: HOME | End: 2025-04-03
Attending: COLON & RECTAL SURGERY | Admitting: COLON & RECTAL SURGERY
Payer: COMMERCIAL

## 2025-03-31 ENCOUNTER — ANESTHESIA (OUTPATIENT)
Dept: SURGERY | Facility: HOSPITAL | Age: 70
End: 2025-03-31
Payer: COMMERCIAL

## 2025-03-31 DIAGNOSIS — C20 RECTAL CANCER (H): ICD-10-CM

## 2025-03-31 DIAGNOSIS — Z93.2 ILEOSTOMY IN PLACE (H): ICD-10-CM

## 2025-03-31 DIAGNOSIS — Z71.89 OSTOMY NURSE CONSULTATION: Primary | ICD-10-CM

## 2025-03-31 LAB
ABO + RH BLD: NORMAL
BLD GP AB SCN SERPL QL: NEGATIVE
CREAT SERPL-MCNC: 0.59 MG/DL (ref 0.51–0.95)
EGFRCR SERPLBLD CKD-EPI 2021: >90 ML/MIN/1.73M2
GLUCOSE BLDC GLUCOMTR-MCNC: 126 MG/DL (ref 70–99)
GLUCOSE BLDC GLUCOMTR-MCNC: 147 MG/DL (ref 70–99)
GLUCOSE BLDC GLUCOMTR-MCNC: 151 MG/DL (ref 70–99)
HGB BLD-MCNC: 12.4 G/DL (ref 11.7–15.7)
HOLD SPECIMEN: NORMAL
MAGNESIUM SERPL-MCNC: 1.7 MG/DL (ref 1.7–2.3)
POTASSIUM SERPL-SCNC: 3.7 MMOL/L (ref 3.4–5.3)
SPECIMEN EXP DATE BLD: NORMAL

## 2025-03-31 PROCEDURE — 370N000017 HC ANESTHESIA TECHNICAL FEE, PER MIN: Performed by: COLON & RECTAL SURGERY

## 2025-03-31 PROCEDURE — 88341 IMHCHEM/IMCYTCHM EA ADD ANTB: CPT | Mod: TC | Performed by: COLON & RECTAL SURGERY

## 2025-03-31 PROCEDURE — 250N000011 HC RX IP 250 OP 636: Mod: JZ | Performed by: COLON & RECTAL SURGERY

## 2025-03-31 PROCEDURE — 84132 ASSAY OF SERUM POTASSIUM: CPT | Performed by: COLON & RECTAL SURGERY

## 2025-03-31 PROCEDURE — 82565 ASSAY OF CREATININE: CPT

## 2025-03-31 PROCEDURE — 710N000009 HC RECOVERY PHASE 1, LEVEL 1, PER MIN: Performed by: COLON & RECTAL SURGERY

## 2025-03-31 PROCEDURE — 250N000009 HC RX 250: Performed by: NURSE ANESTHETIST, CERTIFIED REGISTERED

## 2025-03-31 PROCEDURE — 250N000013 HC RX MED GY IP 250 OP 250 PS 637

## 2025-03-31 PROCEDURE — 360N000080 HC SURGERY LEVEL 7, PER MIN: Performed by: COLON & RECTAL SURGERY

## 2025-03-31 PROCEDURE — 88309 TISSUE EXAM BY PATHOLOGIST: CPT | Mod: TC | Performed by: COLON & RECTAL SURGERY

## 2025-03-31 PROCEDURE — P9045 ALBUMIN (HUMAN), 5%, 250 ML: HCPCS | Performed by: NURSE ANESTHETIST, CERTIFIED REGISTERED

## 2025-03-31 PROCEDURE — 258N000003 HC RX IP 258 OP 636: Performed by: ANESTHESIOLOGY

## 2025-03-31 PROCEDURE — 36415 COLL VENOUS BLD VENIPUNCTURE: CPT | Performed by: COLON & RECTAL SURGERY

## 2025-03-31 PROCEDURE — 250N000009 HC RX 250: Performed by: COLON & RECTAL SURGERY

## 2025-03-31 PROCEDURE — 83735 ASSAY OF MAGNESIUM: CPT | Performed by: COLON & RECTAL SURGERY

## 2025-03-31 PROCEDURE — 272N000001 HC OR GENERAL SUPPLY STERILE: Performed by: COLON & RECTAL SURGERY

## 2025-03-31 PROCEDURE — 88341 IMHCHEM/IMCYTCHM EA ADD ANTB: CPT | Mod: 26 | Performed by: PATHOLOGY

## 2025-03-31 PROCEDURE — 250N000011 HC RX IP 250 OP 636: Mod: JZ

## 2025-03-31 PROCEDURE — 88342 IMHCHEM/IMCYTCHM 1ST ANTB: CPT | Mod: 26 | Performed by: PATHOLOGY

## 2025-03-31 PROCEDURE — 258N000003 HC RX IP 258 OP 636: Performed by: NURSE ANESTHETIST, CERTIFIED REGISTERED

## 2025-03-31 PROCEDURE — 36415 COLL VENOUS BLD VENIPUNCTURE: CPT

## 2025-03-31 PROCEDURE — 258N000003 HC RX IP 258 OP 636

## 2025-03-31 PROCEDURE — 250N000012 HC RX MED GY IP 250 OP 636 PS 637

## 2025-03-31 PROCEDURE — 85018 HEMOGLOBIN: CPT | Performed by: COLON & RECTAL SURGERY

## 2025-03-31 PROCEDURE — 999N000141 HC STATISTIC PRE-PROCEDURE NURSING ASSESSMENT: Performed by: COLON & RECTAL SURGERY

## 2025-03-31 PROCEDURE — 250N000011 HC RX IP 250 OP 636: Performed by: ANESTHESIOLOGY

## 2025-03-31 PROCEDURE — 86850 RBC ANTIBODY SCREEN: CPT | Performed by: COLON & RECTAL SURGERY

## 2025-03-31 PROCEDURE — 86900 BLOOD TYPING SEROLOGIC ABO: CPT | Performed by: COLON & RECTAL SURGERY

## 2025-03-31 PROCEDURE — 88309 TISSUE EXAM BY PATHOLOGIST: CPT | Mod: 26 | Performed by: PATHOLOGY

## 2025-03-31 PROCEDURE — 250N000025 HC SEVOFLURANE, PER MIN: Performed by: COLON & RECTAL SURGERY

## 2025-03-31 PROCEDURE — 250N000013 HC RX MED GY IP 250 OP 250 PS 637: Performed by: COLON & RECTAL SURGERY

## 2025-03-31 PROCEDURE — 250N000011 HC RX IP 250 OP 636: Performed by: COLON & RECTAL SURGERY

## 2025-03-31 PROCEDURE — 250N000011 HC RX IP 250 OP 636: Performed by: NURSE ANESTHETIST, CERTIFIED REGISTERED

## 2025-03-31 PROCEDURE — 120N000001 HC R&B MED SURG/OB

## 2025-03-31 PROCEDURE — 258N000003 HC RX IP 258 OP 636: Performed by: COLON & RECTAL SURGERY

## 2025-03-31 RX ORDER — NALOXONE HYDROCHLORIDE 0.4 MG/ML
0.4 INJECTION, SOLUTION INTRAMUSCULAR; INTRAVENOUS; SUBCUTANEOUS
Status: DISCONTINUED | OUTPATIENT
Start: 2025-03-31 | End: 2025-04-03 | Stop reason: HOSPADM

## 2025-03-31 RX ORDER — ONDANSETRON 2 MG/ML
4 INJECTION INTRAMUSCULAR; INTRAVENOUS EVERY 6 HOURS PRN
Status: DISCONTINUED | OUTPATIENT
Start: 2025-03-31 | End: 2025-04-03 | Stop reason: HOSPADM

## 2025-03-31 RX ORDER — SODIUM CHLORIDE, SODIUM LACTATE, POTASSIUM CHLORIDE, CALCIUM CHLORIDE 600; 310; 30; 20 MG/100ML; MG/100ML; MG/100ML; MG/100ML
INJECTION, SOLUTION INTRAVENOUS CONTINUOUS
Status: DISCONTINUED | OUTPATIENT
Start: 2025-03-31 | End: 2025-03-31 | Stop reason: HOSPADM

## 2025-03-31 RX ORDER — ONDANSETRON 2 MG/ML
4 INJECTION INTRAMUSCULAR; INTRAVENOUS ONCE
Status: COMPLETED | OUTPATIENT
Start: 2025-03-31 | End: 2025-03-31

## 2025-03-31 RX ORDER — FENTANYL CITRATE 50 UG/ML
25 INJECTION, SOLUTION INTRAMUSCULAR; INTRAVENOUS EVERY 5 MIN PRN
Status: DISCONTINUED | OUTPATIENT
Start: 2025-03-31 | End: 2025-03-31 | Stop reason: HOSPADM

## 2025-03-31 RX ORDER — SODIUM CHLORIDE, SODIUM LACTATE, POTASSIUM CHLORIDE, AND CALCIUM CHLORIDE .6; .31; .03; .02 G/100ML; G/100ML; G/100ML; G/100ML
IRRIGANT IRRIGATION PRN
Status: DISCONTINUED | OUTPATIENT
Start: 2025-03-31 | End: 2025-03-31 | Stop reason: HOSPADM

## 2025-03-31 RX ORDER — PROPOFOL 10 MG/ML
INJECTION, EMULSION INTRAVENOUS PRN
Status: DISCONTINUED | OUTPATIENT
Start: 2025-03-31 | End: 2025-03-31

## 2025-03-31 RX ORDER — LIDOCAINE HYDROCHLORIDE 10 MG/ML
INJECTION, SOLUTION INFILTRATION; PERINEURAL PRN
Status: DISCONTINUED | OUTPATIENT
Start: 2025-03-31 | End: 2025-03-31

## 2025-03-31 RX ORDER — GABAPENTIN 100 MG/1
100 CAPSULE ORAL AT BEDTIME
Status: DISCONTINUED | OUTPATIENT
Start: 2025-03-31 | End: 2025-04-03 | Stop reason: HOSPADM

## 2025-03-31 RX ORDER — ROPIVACAINE HYDROCHLORIDE 5 MG/ML
INJECTION, SOLUTION EPIDURAL; INFILTRATION; PERINEURAL
Status: COMPLETED | OUTPATIENT
Start: 2025-03-31 | End: 2025-03-31

## 2025-03-31 RX ORDER — FENTANYL CITRATE 50 UG/ML
INJECTION, SOLUTION INTRAMUSCULAR; INTRAVENOUS PRN
Status: DISCONTINUED | OUTPATIENT
Start: 2025-03-31 | End: 2025-03-31

## 2025-03-31 RX ORDER — PROCHLORPERAZINE MALEATE 5 MG/1
5 TABLET ORAL EVERY 6 HOURS PRN
Status: DISCONTINUED | OUTPATIENT
Start: 2025-03-31 | End: 2025-04-03 | Stop reason: HOSPADM

## 2025-03-31 RX ORDER — ENOXAPARIN SODIUM 100 MG/ML
30 INJECTION SUBCUTANEOUS EVERY 24 HOURS
Status: DISCONTINUED | OUTPATIENT
Start: 2025-04-01 | End: 2025-04-03 | Stop reason: HOSPADM

## 2025-03-31 RX ORDER — HYDROMORPHONE HCL IN WATER/PF 6 MG/30 ML
0.2 PATIENT CONTROLLED ANALGESIA SYRINGE INTRAVENOUS EVERY 4 HOURS PRN
Status: DISCONTINUED | OUTPATIENT
Start: 2025-03-31 | End: 2025-04-03

## 2025-03-31 RX ORDER — NALOXONE HYDROCHLORIDE 1 MG/ML
0.1 INJECTION INTRAMUSCULAR; INTRAVENOUS; SUBCUTANEOUS
Status: CANCELLED | OUTPATIENT
Start: 2025-03-31

## 2025-03-31 RX ORDER — HYDROMORPHONE HCL IN WATER/PF 6 MG/30 ML
0.4 PATIENT CONTROLLED ANALGESIA SYRINGE INTRAVENOUS EVERY 4 HOURS PRN
Status: DISCONTINUED | OUTPATIENT
Start: 2025-03-31 | End: 2025-04-03

## 2025-03-31 RX ORDER — HYDROMORPHONE HCL IN WATER/PF 6 MG/30 ML
0.4 PATIENT CONTROLLED ANALGESIA SYRINGE INTRAVENOUS EVERY 5 MIN PRN
Status: DISCONTINUED | OUTPATIENT
Start: 2025-03-31 | End: 2025-03-31 | Stop reason: HOSPADM

## 2025-03-31 RX ORDER — METRONIDAZOLE 500 MG/100ML
500 INJECTION, SOLUTION INTRAVENOUS
Status: COMPLETED | OUTPATIENT
Start: 2025-03-31 | End: 2025-03-31

## 2025-03-31 RX ORDER — ENOXAPARIN SODIUM 100 MG/ML
40 INJECTION SUBCUTANEOUS EVERY 24 HOURS
Status: DISCONTINUED | OUTPATIENT
Start: 2025-04-01 | End: 2025-03-31

## 2025-03-31 RX ORDER — NALOXONE HYDROCHLORIDE 1 MG/ML
0.1 INJECTION INTRAMUSCULAR; INTRAVENOUS; SUBCUTANEOUS
Status: DISCONTINUED | OUTPATIENT
Start: 2025-03-31 | End: 2025-03-31 | Stop reason: HOSPADM

## 2025-03-31 RX ORDER — ONDANSETRON 4 MG/1
4 TABLET, ORALLY DISINTEGRATING ORAL EVERY 30 MIN PRN
Status: CANCELLED | OUTPATIENT
Start: 2025-03-31

## 2025-03-31 RX ORDER — NALOXONE HYDROCHLORIDE 0.4 MG/ML
0.2 INJECTION, SOLUTION INTRAMUSCULAR; INTRAVENOUS; SUBCUTANEOUS
Status: DISCONTINUED | OUTPATIENT
Start: 2025-03-31 | End: 2025-04-03 | Stop reason: HOSPADM

## 2025-03-31 RX ORDER — DEXTROSE MONOHYDRATE 25 G/50ML
25-50 INJECTION, SOLUTION INTRAVENOUS
Status: DISCONTINUED | OUTPATIENT
Start: 2025-03-31 | End: 2025-04-03 | Stop reason: HOSPADM

## 2025-03-31 RX ORDER — LIDOCAINE 40 MG/G
CREAM TOPICAL
Status: DISCONTINUED | OUTPATIENT
Start: 2025-03-31 | End: 2025-04-03 | Stop reason: HOSPADM

## 2025-03-31 RX ORDER — CALCIUM CARBONATE 500 MG/1
1000 TABLET, CHEWABLE ORAL 4 TIMES DAILY PRN
Status: DISCONTINUED | OUTPATIENT
Start: 2025-03-31 | End: 2025-04-03 | Stop reason: HOSPADM

## 2025-03-31 RX ORDER — ONDANSETRON 4 MG/1
4 TABLET, ORALLY DISINTEGRATING ORAL EVERY 6 HOURS PRN
Status: DISCONTINUED | OUTPATIENT
Start: 2025-03-31 | End: 2025-04-03 | Stop reason: HOSPADM

## 2025-03-31 RX ORDER — SIMETHICONE 80 MG
80 TABLET,CHEWABLE ORAL 4 TIMES DAILY PRN
Status: DISCONTINUED | OUTPATIENT
Start: 2025-03-31 | End: 2025-04-03 | Stop reason: HOSPADM

## 2025-03-31 RX ORDER — ONDANSETRON 2 MG/ML
4 INJECTION INTRAMUSCULAR; INTRAVENOUS EVERY 30 MIN PRN
Status: DISCONTINUED | OUTPATIENT
Start: 2025-03-31 | End: 2025-03-31 | Stop reason: HOSPADM

## 2025-03-31 RX ORDER — FENTANYL CITRATE 50 UG/ML
25 INJECTION, SOLUTION INTRAMUSCULAR; INTRAVENOUS
Status: DISCONTINUED | OUTPATIENT
Start: 2025-03-31 | End: 2025-03-31 | Stop reason: HOSPADM

## 2025-03-31 RX ORDER — ATORVASTATIN CALCIUM 40 MG/1
40 TABLET, FILM COATED ORAL EVERY EVENING
Status: DISCONTINUED | OUTPATIENT
Start: 2025-03-31 | End: 2025-04-03 | Stop reason: HOSPADM

## 2025-03-31 RX ORDER — NALOXONE HYDROCHLORIDE 0.4 MG/ML
0.1 INJECTION, SOLUTION INTRAMUSCULAR; INTRAVENOUS; SUBCUTANEOUS
Status: DISCONTINUED | OUTPATIENT
Start: 2025-03-31 | End: 2025-03-31 | Stop reason: HOSPADM

## 2025-03-31 RX ORDER — ACETAMINOPHEN 325 MG/1
975 TABLET ORAL ONCE
Status: COMPLETED | OUTPATIENT
Start: 2025-03-31 | End: 2025-03-31

## 2025-03-31 RX ORDER — HEPARIN SODIUM 5000 [USP'U]/.5ML
5000 INJECTION, SOLUTION INTRAVENOUS; SUBCUTANEOUS
Status: COMPLETED | OUTPATIENT
Start: 2025-03-31 | End: 2025-03-31

## 2025-03-31 RX ORDER — DEXAMETHASONE SODIUM PHOSPHATE 10 MG/ML
4 INJECTION, SOLUTION INTRAMUSCULAR; INTRAVENOUS
Status: CANCELLED | OUTPATIENT
Start: 2025-03-31

## 2025-03-31 RX ORDER — NICOTINE POLACRILEX 4 MG
15-30 LOZENGE BUCCAL
Status: DISCONTINUED | OUTPATIENT
Start: 2025-03-31 | End: 2025-04-03 | Stop reason: HOSPADM

## 2025-03-31 RX ORDER — MORPHINE SULFATE 1 MG/ML
150 INJECTION, SOLUTION EPIDURAL; INTRATHECAL; INTRAVENOUS ONCE
Status: DISCONTINUED | OUTPATIENT
Start: 2025-03-31 | End: 2025-03-31 | Stop reason: HOSPADM

## 2025-03-31 RX ORDER — DEXAMETHASONE SODIUM PHOSPHATE 10 MG/ML
INJECTION, SOLUTION INTRAMUSCULAR; INTRAVENOUS PRN
Status: DISCONTINUED | OUTPATIENT
Start: 2025-03-31 | End: 2025-03-31

## 2025-03-31 RX ORDER — LIDOCAINE 40 MG/G
CREAM TOPICAL
Status: DISCONTINUED | OUTPATIENT
Start: 2025-03-31 | End: 2025-03-31 | Stop reason: HOSPADM

## 2025-03-31 RX ORDER — INDOCYANINE GREEN AND WATER 25 MG
KIT INJECTION PRN
Status: DISCONTINUED | OUTPATIENT
Start: 2025-03-31 | End: 2025-03-31

## 2025-03-31 RX ORDER — ONDANSETRON 4 MG/1
4 TABLET, ORALLY DISINTEGRATING ORAL EVERY 30 MIN PRN
Status: DISCONTINUED | OUTPATIENT
Start: 2025-03-31 | End: 2025-03-31 | Stop reason: HOSPADM

## 2025-03-31 RX ORDER — OXYCODONE HYDROCHLORIDE 5 MG/1
10 TABLET ORAL
Status: CANCELLED | OUTPATIENT
Start: 2025-03-31

## 2025-03-31 RX ORDER — POLYETHYLENE GLYCOL 3350 17 G/17G
1 POWDER, FOR SOLUTION ORAL DAILY
Status: ON HOLD | COMMUNITY
End: 2025-04-02

## 2025-03-31 RX ORDER — CEFAZOLIN SODIUM/WATER 2 G/20 ML
2 SYRINGE (ML) INTRAVENOUS
Status: COMPLETED | OUTPATIENT
Start: 2025-03-31 | End: 2025-03-31

## 2025-03-31 RX ORDER — ONDANSETRON 2 MG/ML
INJECTION INTRAMUSCULAR; INTRAVENOUS PRN
Status: DISCONTINUED | OUTPATIENT
Start: 2025-03-31 | End: 2025-03-31

## 2025-03-31 RX ORDER — ACETAMINOPHEN 500 MG
1000 TABLET ORAL EVERY 6 HOURS
Status: DISCONTINUED | OUTPATIENT
Start: 2025-03-31 | End: 2025-04-03 | Stop reason: HOSPADM

## 2025-03-31 RX ORDER — HYDRALAZINE HYDROCHLORIDE 20 MG/ML
10 INJECTION INTRAMUSCULAR; INTRAVENOUS EVERY 6 HOURS PRN
Status: DISCONTINUED | OUTPATIENT
Start: 2025-03-31 | End: 2025-04-03 | Stop reason: HOSPADM

## 2025-03-31 RX ORDER — HYDROMORPHONE HCL IN WATER/PF 6 MG/30 ML
0.2 PATIENT CONTROLLED ANALGESIA SYRINGE INTRAVENOUS EVERY 5 MIN PRN
Status: DISCONTINUED | OUTPATIENT
Start: 2025-03-31 | End: 2025-03-31 | Stop reason: HOSPADM

## 2025-03-31 RX ORDER — OXYCODONE HYDROCHLORIDE 5 MG/1
5 TABLET ORAL
Status: CANCELLED | OUTPATIENT
Start: 2025-03-31

## 2025-03-31 RX ORDER — FENTANYL CITRATE 50 UG/ML
50 INJECTION, SOLUTION INTRAMUSCULAR; INTRAVENOUS EVERY 5 MIN PRN
Status: DISCONTINUED | OUTPATIENT
Start: 2025-03-31 | End: 2025-03-31 | Stop reason: HOSPADM

## 2025-03-31 RX ORDER — FENTANYL CITRATE 50 UG/ML
25-100 INJECTION, SOLUTION INTRAMUSCULAR; INTRAVENOUS
Status: DISCONTINUED | OUTPATIENT
Start: 2025-03-31 | End: 2025-03-31 | Stop reason: HOSPADM

## 2025-03-31 RX ORDER — SODIUM CHLORIDE, SODIUM LACTATE, POTASSIUM CHLORIDE, CALCIUM CHLORIDE 600; 310; 30; 20 MG/100ML; MG/100ML; MG/100ML; MG/100ML
INJECTION, SOLUTION INTRAVENOUS CONTINUOUS
Status: DISCONTINUED | OUTPATIENT
Start: 2025-03-31 | End: 2025-04-01

## 2025-03-31 RX ORDER — FLUMAZENIL 0.1 MG/ML
0.2 INJECTION, SOLUTION INTRAVENOUS
Status: DISCONTINUED | OUTPATIENT
Start: 2025-03-31 | End: 2025-03-31 | Stop reason: HOSPADM

## 2025-03-31 RX ORDER — PROPOFOL 10 MG/ML
INJECTION, EMULSION INTRAVENOUS CONTINUOUS PRN
Status: DISCONTINUED | OUTPATIENT
Start: 2025-03-31 | End: 2025-03-31

## 2025-03-31 RX ORDER — DEXAMETHASONE SODIUM PHOSPHATE 4 MG/ML
4 INJECTION, SOLUTION INTRA-ARTICULAR; INTRALESIONAL; INTRAMUSCULAR; INTRAVENOUS; SOFT TISSUE
Status: DISCONTINUED | OUTPATIENT
Start: 2025-03-31 | End: 2025-03-31 | Stop reason: HOSPADM

## 2025-03-31 RX ORDER — ONDANSETRON 2 MG/ML
4 INJECTION INTRAMUSCULAR; INTRAVENOUS EVERY 30 MIN PRN
Status: CANCELLED | OUTPATIENT
Start: 2025-03-31

## 2025-03-31 RX ORDER — MAGNESIUM HYDROXIDE 1200 MG/15ML
LIQUID ORAL PRN
Status: DISCONTINUED | OUTPATIENT
Start: 2025-03-31 | End: 2025-03-31 | Stop reason: HOSPADM

## 2025-03-31 RX ORDER — CEFAZOLIN SODIUM/WATER 2 G/20 ML
2 SYRINGE (ML) INTRAVENOUS SEE ADMIN INSTRUCTIONS
Status: DISCONTINUED | OUTPATIENT
Start: 2025-03-31 | End: 2025-03-31 | Stop reason: HOSPADM

## 2025-03-31 RX ADMIN — ALBUMIN HUMAN: 0.05 INJECTION, SOLUTION INTRAVENOUS at 08:41

## 2025-03-31 RX ADMIN — ROPIVACAINE HYDROCHLORIDE 20 ML: 5 INJECTION, SOLUTION EPIDURAL; INFILTRATION; PERINEURAL at 13:19

## 2025-03-31 RX ADMIN — ALBUMIN HUMAN: 0.05 INJECTION, SOLUTION INTRAVENOUS at 09:16

## 2025-03-31 RX ADMIN — INDOCYANINE GREEN AND WATER 5 MG: KIT at 11:04

## 2025-03-31 RX ADMIN — ACETAMINOPHEN 1000 MG: 500 TABLET ORAL at 16:08

## 2025-03-31 RX ADMIN — ACETAMINOPHEN 1000 MG: 500 TABLET ORAL at 22:01

## 2025-03-31 RX ADMIN — DEXMEDETOMIDINE HYDROCHLORIDE 20 MCG: 100 INJECTION, SOLUTION INTRAVENOUS at 07:35

## 2025-03-31 RX ADMIN — PROPOFOL 25 MCG/KG/MIN: 10 INJECTION, EMULSION INTRAVENOUS at 09:05

## 2025-03-31 RX ADMIN — SODIUM CHLORIDE, SODIUM LACTATE, POTASSIUM CHLORIDE, AND CALCIUM CHLORIDE: .6; .31; .03; .02 INJECTION, SOLUTION INTRAVENOUS at 14:22

## 2025-03-31 RX ADMIN — HYDROMORPHONE HYDROCHLORIDE 0.4 MG: 0.2 INJECTION, SOLUTION INTRAMUSCULAR; INTRAVENOUS; SUBCUTANEOUS at 16:09

## 2025-03-31 RX ADMIN — PHENYLEPHRINE HYDROCHLORIDE 100 MCG: 10 INJECTION INTRAVENOUS at 07:54

## 2025-03-31 RX ADMIN — PHENYLEPHRINE HYDROCHLORIDE 100 MCG: 10 INJECTION INTRAVENOUS at 08:15

## 2025-03-31 RX ADMIN — INSULIN ASPART 1 UNITS: 100 INJECTION, SOLUTION INTRAVENOUS; SUBCUTANEOUS at 17:59

## 2025-03-31 RX ADMIN — HYDROMORPHONE HYDROCHLORIDE 0.5 MG: 1 INJECTION, SOLUTION INTRAMUSCULAR; INTRAVENOUS; SUBCUTANEOUS at 12:57

## 2025-03-31 RX ADMIN — PROPOFOL 140 MG: 10 INJECTION, EMULSION INTRAVENOUS at 07:43

## 2025-03-31 RX ADMIN — FENTANYL CITRATE 100 MCG: 50 INJECTION, SOLUTION INTRAMUSCULAR; INTRAVENOUS at 07:43

## 2025-03-31 RX ADMIN — PHENYLEPHRINE HYDROCHLORIDE 100 MCG: 10 INJECTION INTRAVENOUS at 07:48

## 2025-03-31 RX ADMIN — SUGAMMADEX 100 MG: 100 INJECTION, SOLUTION INTRAVENOUS at 13:07

## 2025-03-31 RX ADMIN — PHENYLEPHRINE HYDROCHLORIDE 150 MCG: 10 INJECTION INTRAVENOUS at 08:03

## 2025-03-31 RX ADMIN — PHENYLEPHRINE HYDROCHLORIDE 150 MCG: 10 INJECTION INTRAVENOUS at 07:52

## 2025-03-31 RX ADMIN — HEPARIN SODIUM 5000 UNITS: 10000 INJECTION, SOLUTION INTRAVENOUS; SUBCUTANEOUS at 06:56

## 2025-03-31 RX ADMIN — SODIUM CHLORIDE, SODIUM LACTATE, POTASSIUM CHLORIDE, AND CALCIUM CHLORIDE: .6; .31; .03; .02 INJECTION, SOLUTION INTRAVENOUS at 16:14

## 2025-03-31 RX ADMIN — ATORVASTATIN CALCIUM 40 MG: 40 TABLET, FILM COATED ORAL at 20:34

## 2025-03-31 RX ADMIN — DEXAMETHASONE SODIUM PHOSPHATE 10 MG: 10 INJECTION, SOLUTION INTRAMUSCULAR; INTRAVENOUS at 08:25

## 2025-03-31 RX ADMIN — ACETAMINOPHEN 975 MG: 325 TABLET ORAL at 06:42

## 2025-03-31 RX ADMIN — ROPIVACAINE HYDROCHLORIDE 20 ML: 5 INJECTION, SOLUTION EPIDURAL; INFILTRATION; PERINEURAL at 13:21

## 2025-03-31 RX ADMIN — ROCURONIUM 100 MG: 50 INJECTION, SOLUTION INTRAVENOUS at 07:44

## 2025-03-31 RX ADMIN — METRONIDAZOLE 500 MG: 500 INJECTION, SOLUTION INTRAVENOUS at 06:51

## 2025-03-31 RX ADMIN — ONDANSETRON 4 MG: 2 INJECTION INTRAMUSCULAR; INTRAVENOUS at 12:57

## 2025-03-31 RX ADMIN — PHENYLEPHRINE HYDROCHLORIDE 0.3 MCG/KG/MIN: 10 INJECTION INTRAVENOUS at 08:15

## 2025-03-31 RX ADMIN — PROPOFOL 50 MG: 10 INJECTION, EMULSION INTRAVENOUS at 13:18

## 2025-03-31 RX ADMIN — SODIUM CHLORIDE, SODIUM LACTATE, POTASSIUM CHLORIDE, AND CALCIUM CHLORIDE: .6; .31; .03; .02 INJECTION, SOLUTION INTRAVENOUS at 11:33

## 2025-03-31 RX ADMIN — Medication 2 G: at 07:35

## 2025-03-31 RX ADMIN — HYDROMORPHONE HYDROCHLORIDE 0.2 MG: 0.2 INJECTION, SOLUTION INTRAMUSCULAR; INTRAVENOUS; SUBCUTANEOUS at 22:03

## 2025-03-31 RX ADMIN — LIDOCAINE HYDROCHLORIDE 5 ML: 10 INJECTION, SOLUTION INFILTRATION; PERINEURAL at 07:43

## 2025-03-31 RX ADMIN — Medication 2 G: at 11:24

## 2025-03-31 RX ADMIN — SODIUM CHLORIDE, SODIUM LACTATE, POTASSIUM CHLORIDE, AND CALCIUM CHLORIDE: .6; .31; .03; .02 INJECTION, SOLUTION INTRAVENOUS at 06:56

## 2025-03-31 RX ADMIN — ONDANSETRON 4 MG: 2 INJECTION, SOLUTION INTRAMUSCULAR; INTRAVENOUS at 06:55

## 2025-03-31 ASSESSMENT — ACTIVITIES OF DAILY LIVING (ADL)
ADLS_ACUITY_SCORE: 22
ADLS_ACUITY_SCORE: 24
ADLS_ACUITY_SCORE: 22
ADLS_ACUITY_SCORE: 24
ADLS_ACUITY_SCORE: 22
ADLS_ACUITY_SCORE: 18
ADLS_ACUITY_SCORE: 22
ADLS_ACUITY_SCORE: 22
ADLS_ACUITY_SCORE: 24
ADLS_ACUITY_SCORE: 24
ADLS_ACUITY_SCORE: 22

## 2025-03-31 NOTE — PHARMACY-ADMISSION MEDICATION HISTORY
Pharmacist Admission Medication History    Admission medication history is complete. The information provided in this note is only as accurate as the sources available at the time of the update.    Information Source(s): Patient, Clinic records, and CareEverywhere/SureScripts via in-person    Pertinent Information: Patient completed bowel preparation as directed, last doses of metronidazole and neomycin last evening ~ 2300.    Changes made to PTA medication list:  Added: None  Deleted: Depakote (ineffective), simvastatin  Changed: None    Allergies reviewed with patient and updates made in EHR: yes    Medication History Completed By: Antonieta Krishna RPH 3/31/2025 6:56 AM    PTA Med List   Medication Sig Last Dose/Taking    alendronate (FOSAMAX) 70 MG tablet Take 1 Tablet (70 mg) by mouth once every week. Take 30 minutes before first food-drink-medication. Avoid lying down for 30 minutes.* 3/29/2025 Morning    atorvastatin (LIPITOR) 40 MG tablet Take 1 Tablet (40 mg) by mouth daily.* 3/29/2025 Evening    Calcium Carbonate-Vitamin D 250-3.125 MG-MCG TABS Take 1 Tablet (250 mg elemental Ca) by mouth daily. 3/29/2025 Noon    lisinopril (ZESTRIL) 5 MG tablet Take 1 Tablet (5 mg) by mouth daily.* 3/29/2025 Evening    metFORMIN (GLUCOPHAGE XR) 500 MG 24 hr tablet Take 500 mg by mouth daily (with dinner). 3/29/2025 Evening    Multiple Vitamins-Iron (MULTIVITAMIN/IRON PO) Take 1 tablet by mouth daily. 3/29/2025 Noon    polyethylene glycol (MIRALAX) 17 g packet Take 1 packet by mouth daily. 3/29/2025 Noon

## 2025-03-31 NOTE — BRIEF OP NOTE
"[facility]    Brief Operative Note    Pre-operative diagnosis: History of rectal cancer [Z85.048]  Post-operative diagnosis Same as pre-operative diagnosis    Procedure: ROBOTIC LOW ANTERIOR RESECTION, WITH DIVERTERTING LOOP ILEOSTOMY, N/A - Abdomen    Surgeon: Surgeons and Role:     * Ekaterina Ramos MD - Primary     * Merary Figueroa PA-C - Assisting     * Gita Saleh MD - Assisting  Anesthesia: General with Block   Estimated Blood Loss: 30    Drains: None  Specimens:   ID Type Source Tests Collected by Time Destination   1 : Sigmoid And Rectum Colon \"OPEN AND RETURN\" Tissue Large Intestine, Colon, Sigmoid/Rectal SURGICAL PATHOLOGY EXAM Ekaterina Ramos MD 3/31/2025 11:44 AM    2 : ANASTOMOSIS RINGS Tissue Anastomosis SURGICAL PATHOLOGY EXAM Ekaterina Ramos MD 3/31/2025 12:10 PM      Findings:   Mass with tattoo in mid-distal rectum.  Complications: None.  Implants: * No implants in log *    ADDENDUM:    PATIENT DATA  Indicate Y or N:  Home O2 No  Hemodialysis No  Transplant patient No  Cirrhosis No  Steroids in last 30 days No  Immunomodulators in last 30 days No  Anticoagulation at time of surgery No   List medication NA  Prior abdominal surgery No  Pelvic irradiation No    Albumin within 30 days if known NA  Hgb within 30 days if known 12.4  Cr within 30 days if known NA  Body mass index is 17.71 kg/m .    OR DATA  Emergent No   <24 hours No   <1 week No  Bowel Prep (Y or N) Yes  Antibiotics (Y or N) Yes  DVT prophylaxis    Heparin Yes   SCD Yes   None No  Drain No  ASA (1,2,3,4,5 if unknown) 3  OR time (min) 175  Stents No  Transfuse >/= 2U No  Anastomosis   Stapled Yes   Handsewn No  Leak Test (pos, neg, not done) Negative    FOR CANCER ALSO COMPLETE:  Preoperative treatment (Y or N)   Chemo No   Radiation No   Diversion Yes  If rectal cancer   Distance from anal verge (cm) 7   Location    Anterior Yes    Posterior No    Lateral No    Circumferential No  Preoperative " Stage   U/S No   MRI Yes   Clinical Yes   Unknown Yes   T stage (1,2,3,4,5 if unknown) 5   N stage (0,1,2,3 if unknown) 3   M stage (0,1) 0  CEA 1.6   Metastatic disease at time of operation (Y or N) No    Please remember to route your note to the office

## 2025-03-31 NOTE — ANESTHESIA PROCEDURE NOTES
"TAP Procedure Note    Pre-Procedure   Staff -        Anesthesiologist:  Nain Rojas MD       Performed By: anesthesiologist       Location: OR       Procedure Start/Stop Times: 3/31/2025 1:18 PM and 3/31/2025 1:22 AM       Pre-Anesthestic Checklist: patient identified, IV checked, site marked, risks and benefits discussed, informed consent, monitors and equipment checked, pre-op evaluation, at physician/surgeon's request and post-op pain management  Timeout:       Correct Patient: Yes        Correct Procedure: Yes        Correct Site: Yes        Correct Position: Yes        Correct Laterality: Yes        Site Marked: Yes  Procedure Documentation  Procedure: TAP         Laterality: bilateral       Patient Position: supine       Skin prep: Chloraprep       Needle Type: insulated       Needle Gauge: 22.        Needle Length (Inches): 4        Ultrasound guided       1. Ultrasound was used to identify targeted nerve, plexus, vascular marker, or fascial plane and place a needle adjacent to it in real-time.       2. Ultrasound was used to visualize the spread of anesthetic in close proximity to the above referenced structure.       3. A permanent image is entered into the patient's record.       4. The visualized anatomic structures appeared normal.       5. There were no apparent abnormal pathologic findings.    Assessment/Narrative         The placement was negative for: blood aspirated, painful injection and site bleeding       Paresthesias: No.       Bolus given via needle..        Secured via.        Insertion/Infusion Method: Single Shot    Medication(s) Administered   Ropivacaine 0.5% PF (Infiltration) - Infiltration, Flank    20 mL - 3/31/2025 1:19:00 PM   20 mL - 3/31/2025 1:21:00 PM  Medication Administration Time: 3/31/2025 1:18 PM      FOR [facility] ([facility]) ONLY:   Pain Team Contact information: please page the Pain Team Via ConnectToHome. Search \"Pain\". During daytime hours, please page the attending first. " At night please page the resident first.

## 2025-03-31 NOTE — ANESTHESIA PROCEDURE NOTES
Airway       Patient location during procedure: OR       Procedure Start/Stop Times: 3/31/2025 7:46 AM  Staff -        CRNA: Margaux Nielsen APRN CRNA       Performed By: CRNA  Consent for Airway        Urgency: elective  Indications and Patient Condition       Indications for airway management: sherlyn-procedural       Induction type:intravenous       Mask difficulty assessment: 2 - vent by mask + OA or adjuvant +/- NMBA    Final Airway Details       Final airway type: endotracheal airway       Successful airway: ETT - single and Oral  Endotracheal Airway Details        ETT size (mm): 7.0       Cuffed: yes       Cuff volume (mL): 5       Successful intubation technique: direct laryngoscopy       DL Blade Type: Leal 2       Grade View of Cords: 1       Adjucts: stylet       Position: Right       Measured from: gums/teeth       Secured at (cm): 20       Bite block used: None    Post intubation assessment        Placement verified by: capnometry, equal breath sounds and chest rise        Number of attempts at approach: 1       Number of other approaches attempted: 0       Secured with: tape       Ease of procedure: easy       Dentition: Intact and Unchanged       Dental guard used and removed. Dental Guard Type: Standard White.    Medication(s) Administered   Medication Administration Time: 3/31/2025 7:46 AM

## 2025-03-31 NOTE — ANESTHESIA CARE TRANSFER NOTE
Patient: Kailee Lam    Procedure: Procedure(s):  ROBOTIC LOW ANTERIOR RESECTION, WITH DIVERTERTING LOOP ILEOSTOMY       Diagnosis: History of rectal cancer [Z85.048]  Diagnosis Additional Information: No value filed.    Anesthesia Type:   General     Note:    Oropharynx: oropharynx clear of all foreign objects, oral airway in place and spontaneously breathing  Level of Consciousness: drowsy  Oxygen Supplementation: face mask  Level of Supplemental Oxygen (L/min / FiO2): 8  Independent Airway: airway patency satisfactory and stable  Dentition: dentition unchanged  Vital Signs Stable: post-procedure vital signs reviewed and stable  Report to RN Given: handoff report given  Patient transferred to: PACU    Handoff Report: Identifed the Patient, Identified the Reponsible Provider, Reviewed the pertinent medical history, Discussed the surgical course, Reviewed Intra-OP anesthesia mangement and issues during anesthesia, Set expectations for post-procedure period and Allowed opportunity for questions and acknowledgement of understanding      Vitals:  Vitals Value Taken Time   /53 03/31/25 1334   Temp 35.5  C (95.9  F) 03/31/25 1338   Pulse 75 03/31/25 1338   Resp 22 03/31/25 1338   SpO2 100 % 03/31/25 1338   Vitals shown include unfiled device data.    Electronically Signed By: CRYSTAL Herrera CRNA  March 31, 2025  1:40 PM

## 2025-03-31 NOTE — ANESTHESIA PREPROCEDURE EVALUATION
"Anesthesia Pre-Procedure Evaluation    Patient: Kailee Lam   MRN: [id number] : 1955        Procedure : Procedure(s):  ROBOTIC LOW ANTERIOR RESECTION, POSSIBLE DIVERTERTING LOOP ILEOSTOMY          Past Medical History:   Diagnosis Date     Dysplastic nevus      Gross hematuria      Hypertension      Intractable migraine without aura and with status migrainosus      Mixed hyperlipidemia      Osteoporosis      Rectal adenocarcinoma (H)      Seasonal allergic rhinitis       History reviewed. No pertinent surgical history.   No Known Allergies   Social History     Tobacco Use     Smoking status: Never     Smokeless tobacco: Never   Substance Use Topics     Alcohol use: Not Currently      Wt Readings from Last 1 Encounters:   24 50.7 kg (111 lb 12.8 oz)        Anesthesia Evaluation            ROS/MED HX  ENT/Pulmonary:       Neurologic:     (+)      migraines,                          Cardiovascular:     (+) Dyslipidemia hypertension- -   -  - -                                      METS/Exercise Tolerance:     Hematologic:       Musculoskeletal:       GI/Hepatic:       Renal/Genitourinary:       Endo:     (+)  type II DM,                    Psychiatric/Substance Use:       Infectious Disease:       Malignancy:       Other:            Physical Exam    Airway        Mallampati: II   TM distance: > 3 FB   Neck ROM: full   Mouth opening: < 3 cm    Respiratory Devices and Support         Dental       (+) Modest Abnormalities - crowns, retainers, 1 or 2 missing teeth      Cardiovascular   cardiovascular exam normal          Pulmonary   pulmonary exam normal            OUTSIDE LABS:  CBC: No results found for: \"WBC\", \"HGB\", \"HCT\", \"PLT\"  BMP: No results found for: \"NA\", \"POTASSIUM\", \"CHLORIDE\", \"CO2\", \"BUN\", \"CR\", \"GLC\"  COAGS: No results found for: \"PTT\", \"INR\", \"FIBR\"  POC: No results found for: \"BGM\", \"HCG\", \"HCGS\"  HEPATIC: No results found for: \"ALBUMIN\", \"PROTTOTAL\", \"ALT\", \"AST\", \"GGT\", \"ALKPHOS\", " "\"BILITOTAL\", \"BILIDIRECT\", \"GIOVANI\"  OTHER: No results found for: \"PH\", \"LACT\", \"A1C\", \"PIETER\", \"PHOS\", \"MAG\", \"LIPASE\", \"AMYLASE\", \"TSH\", \"T4\", \"T3\", \"CRP\", \"SED\"    Anesthesia Plan    ASA Status:  3       Anesthesia Type: General.     - Airway: ETT   Induction: Intravenous.           Consents    Anesthesia Plan(s) and associated risks, benefits, and realistic alternatives discussed. Questions answered and patient/representative(s) expressed understanding.     - Discussed: Risks, Benefits and Alternatives for BOTH SEDATION and the PROCEDURE were discussed     - Discussed with:  Patient      - Extended Intubation/Ventilatory Support Discussed: No.      - Patient is DNR/DNI Status: No     Use of blood products discussed: Yes.     - Discussed with: Patient.     - Consented: consented to blood products     Postoperative Care    Pain management: Neuraxial analgesia, Peripheral nerve block (Single Shot).   PONV prophylaxis: Ondansetron (or other 5HT-3), Promethazine or metoclopramide, Dexamethasone or Solumedrol     Comments:    Other Comments: Patient refuses the spinal, agrees to the TAP block           Nain Rojas MD    Clinically Significant Risk Factors Present on Admission                                          "

## 2025-03-31 NOTE — ANESTHESIA POSTPROCEDURE EVALUATION
Patient: Kailee Lam    Procedure: Procedure(s):  ROBOTIC LOW ANTERIOR RESECTION, WITH DIVERTERTING LOOP ILEOSTOMY       Anesthesia Type:  General    Note:  Disposition: Admission   Postop Pain Control: Uneventful            Sign Out: Well controlled pain   PONV: No   Neuro/Psych: Uneventful            Sign Out: Acceptable/Baseline neuro status   Airway/Respiratory: Uneventful            Sign Out: Acceptable/Baseline resp. status   CV/Hemodynamics: Uneventful            Sign Out: Acceptable CV status; No obvious hypovolemia; No obvious fluid overload   Other NRE: NONE   DID A NON-ROUTINE EVENT OCCUR? No       Last vitals:  Vitals Value Taken Time   BP 97/55 03/31/25 1345   Temp 35.5  C (95.9  F) 03/31/25 1349   Pulse 84 03/31/25 1349   Resp 16 03/31/25 1349   SpO2 100 % 03/31/25 1349   Vitals shown include unfiled device data.    Electronically Signed By: Sima Parker MD  March 31, 2025  1:50 PM

## 2025-04-01 LAB
ANION GAP SERPL CALCULATED.3IONS-SCNC: 10 MMOL/L (ref 7–15)
BUN SERPL-MCNC: 6.1 MG/DL (ref 8–23)
CALCIUM SERPL-MCNC: 8.3 MG/DL (ref 8.8–10.4)
CHLORIDE SERPL-SCNC: 105 MMOL/L (ref 98–107)
CREAT SERPL-MCNC: 0.55 MG/DL (ref 0.51–0.95)
EGFRCR SERPLBLD CKD-EPI 2021: >90 ML/MIN/1.73M2
ERYTHROCYTE [DISTWIDTH] IN BLOOD BY AUTOMATED COUNT: 12.8 % (ref 10–15)
GLUCOSE BLDC GLUCOMTR-MCNC: 102 MG/DL (ref 70–99)
GLUCOSE BLDC GLUCOMTR-MCNC: 107 MG/DL (ref 70–99)
GLUCOSE BLDC GLUCOMTR-MCNC: 107 MG/DL (ref 70–99)
GLUCOSE BLDC GLUCOMTR-MCNC: 124 MG/DL (ref 70–99)
GLUCOSE BLDC GLUCOMTR-MCNC: 93 MG/DL (ref 70–99)
GLUCOSE SERPL-MCNC: 101 MG/DL (ref 70–99)
HCO3 SERPL-SCNC: 24 MMOL/L (ref 22–29)
HCT VFR BLD AUTO: 31.6 % (ref 35–47)
HGB BLD-MCNC: 10.5 G/DL (ref 11.7–15.7)
MAGNESIUM SERPL-MCNC: 1.6 MG/DL (ref 1.7–2.3)
MCH RBC QN AUTO: 29.4 PG (ref 26.5–33)
MCHC RBC AUTO-ENTMCNC: 33.2 G/DL (ref 31.5–36.5)
MCV RBC AUTO: 89 FL (ref 78–100)
PLATELET # BLD AUTO: 329 10E3/UL (ref 150–450)
POTASSIUM SERPL-SCNC: 3.1 MMOL/L (ref 3.4–5.3)
POTASSIUM SERPL-SCNC: 3.4 MMOL/L (ref 3.4–5.3)
POTASSIUM SERPL-SCNC: 3.4 MMOL/L (ref 3.4–5.3)
RBC # BLD AUTO: 3.57 10E6/UL (ref 3.8–5.2)
SODIUM SERPL-SCNC: 139 MMOL/L (ref 135–145)
WBC # BLD AUTO: 11.8 10E3/UL (ref 4–11)

## 2025-04-01 PROCEDURE — 84132 ASSAY OF SERUM POTASSIUM: CPT | Performed by: COLON & RECTAL SURGERY

## 2025-04-01 PROCEDURE — 83735 ASSAY OF MAGNESIUM: CPT

## 2025-04-01 PROCEDURE — G0463 HOSPITAL OUTPT CLINIC VISIT: HCPCS

## 2025-04-01 PROCEDURE — 250N000013 HC RX MED GY IP 250 OP 250 PS 637: Performed by: COLON & RECTAL SURGERY

## 2025-04-01 PROCEDURE — 82310 ASSAY OF CALCIUM: CPT

## 2025-04-01 PROCEDURE — 250N000011 HC RX IP 250 OP 636

## 2025-04-01 PROCEDURE — 250N000013 HC RX MED GY IP 250 OP 250 PS 637

## 2025-04-01 PROCEDURE — 85014 HEMATOCRIT: CPT

## 2025-04-01 PROCEDURE — 258N000003 HC RX IP 258 OP 636

## 2025-04-01 PROCEDURE — 36415 COLL VENOUS BLD VENIPUNCTURE: CPT | Performed by: COLON & RECTAL SURGERY

## 2025-04-01 PROCEDURE — 36415 COLL VENOUS BLD VENIPUNCTURE: CPT

## 2025-04-01 PROCEDURE — 80048 BASIC METABOLIC PNL TOTAL CA: CPT

## 2025-04-01 PROCEDURE — 82565 ASSAY OF CREATININE: CPT

## 2025-04-01 PROCEDURE — 120N000001 HC R&B MED SURG/OB

## 2025-04-01 RX ORDER — POTASSIUM CHLORIDE 1500 MG/1
20 TABLET, EXTENDED RELEASE ORAL ONCE
Status: COMPLETED | OUTPATIENT
Start: 2025-04-01 | End: 2025-04-01

## 2025-04-01 RX ORDER — OXYCODONE HYDROCHLORIDE 5 MG/1
5-10 TABLET ORAL EVERY 4 HOURS PRN
Status: DISCONTINUED | OUTPATIENT
Start: 2025-04-01 | End: 2025-04-03 | Stop reason: HOSPADM

## 2025-04-01 RX ADMIN — SODIUM CHLORIDE, SODIUM LACTATE, POTASSIUM CHLORIDE, AND CALCIUM CHLORIDE: .6; .31; .03; .02 INJECTION, SOLUTION INTRAVENOUS at 03:06

## 2025-04-01 RX ADMIN — ACETAMINOPHEN 1000 MG: 500 TABLET ORAL at 04:30

## 2025-04-01 RX ADMIN — ENOXAPARIN SODIUM 30 MG: 30 INJECTION SUBCUTANEOUS at 09:40

## 2025-04-01 RX ADMIN — ACETAMINOPHEN 1000 MG: 500 TABLET ORAL at 09:40

## 2025-04-01 RX ADMIN — ACETAMINOPHEN 1000 MG: 500 TABLET ORAL at 16:06

## 2025-04-01 RX ADMIN — ACETAMINOPHEN 1000 MG: 500 TABLET ORAL at 21:47

## 2025-04-01 RX ADMIN — POTASSIUM CHLORIDE 20 MEQ: 1500 TABLET, EXTENDED RELEASE ORAL at 20:13

## 2025-04-01 RX ADMIN — POTASSIUM CHLORIDE 20 MEQ: 1500 TABLET, EXTENDED RELEASE ORAL at 08:10

## 2025-04-01 RX ADMIN — ATORVASTATIN CALCIUM 40 MG: 40 TABLET, FILM COATED ORAL at 20:13

## 2025-04-01 RX ADMIN — POTASSIUM CHLORIDE 20 MEQ: 1500 TABLET, EXTENDED RELEASE ORAL at 13:42

## 2025-04-01 ASSESSMENT — ACTIVITIES OF DAILY LIVING (ADL)
ADLS_ACUITY_SCORE: 22
ADLS_ACUITY_SCORE: 26
ADLS_ACUITY_SCORE: 43
ADLS_ACUITY_SCORE: 43
ADLS_ACUITY_SCORE: 41
ADLS_ACUITY_SCORE: 43
ADLS_ACUITY_SCORE: 41
ADLS_ACUITY_SCORE: 22
ADLS_ACUITY_SCORE: 27
ADLS_ACUITY_SCORE: 26
ADLS_ACUITY_SCORE: 43
ADLS_ACUITY_SCORE: 22
ADLS_ACUITY_SCORE: 22
ADLS_ACUITY_SCORE: 41
ADLS_ACUITY_SCORE: 26
ADLS_ACUITY_SCORE: 41
ADLS_ACUITY_SCORE: 41
ADLS_ACUITY_SCORE: 43
ADLS_ACUITY_SCORE: 41
ADLS_ACUITY_SCORE: 26
ADLS_ACUITY_SCORE: 41

## 2025-04-01 NOTE — PLAN OF CARE
Problem: Adult Inpatient Plan of Care  Goal: Optimal Comfort and Wellbeing  Outcome: Progressing  Intervention: Monitor Pain and Promote Comfort  Recent Flowsheet Documentation  Taken 4/1/2025 0430 by January Urbina, RN  Pain Management Interventions: medication (see MAR)  Taken 4/1/2025 0300 by January Urbina, RN  Pain Management Interventions: cold applied   Goal Outcome Evaluation:       Pt is alert and oriented, able to make needs known. Scheduled tylenol given for discomfort along with ice packs. Ileostomy intact with green liquid stool. Mendoza intact draining adequate amounts of [place] urine. Pt tolerating clears, denies any nausea. Uneventful shift.  January Urbina, RN

## 2025-04-01 NOTE — PROGRESS NOTES
"CLINICAL NUTRITION SERVICES - ASSESSMENT NOTE    RECOMMENDATIONS FOR MDs/PROVIDERS TO ORDER:    Registered Dietitian Interventions:  None: pt declined offer for supplements, stated nutrition education previously given    Future/Additional Recommendations:  F/U PO     REASON FOR ASSESSMENT  MST 2 (reported: 2-13# wt loss [1], decreased appetite [1])    INFORMATION OBTAINED  Pt tells of [id number] PO since February 20th, when she did a colonoscopy. This was then followed by Normelissa, and then a CAT with contrast with she states gave her issues as well (negatively affected appetite)  States tolerating PO since procedure, however: recently, half of last meal  Sternly declined offer for nutrition supplements.  Firmly stated she has had education RE \"what diet should look like in the short-term\" and does not have any questions for the RD.    NUTRITION HISTORY  69 year old female POD #1 s/p robotic ultra low LAR and DLI    CURRENT NUTRITION ORDERS  Diet: LowFiber    CURRENT INTAKE/TOLERANCE  4/1: \"half\" YUN tray per pt: meal contained 455kcal and 31g protein total /[employer]  3/31: 75% CLD SUP tray /FS    LABS  Nutrition-relevant labs: Reviewed    MEDICATIONS  Nutrition-relevant medications: Reviewed    ANTHROPOMETRICS  Height: 160 cm (5' 3\")  Admission Weight: 45.4 kg (100 lb) (03/31/25 0546)   Most Recent Weight: 45.4 kg (100 lb) (03/31/25 0546)  IBW: 52.3 kg  BMI: Body mass index is 17.71 kg/m .     Weight History:   03/31/25 : 45.4 kg (100 lb) (standing scale)  01/23/25 : 49.3kg (chart rev office visit)  -7.9% w/i 3m (SIG)  12/05/24 : 50.7 kg (111 lb 12.8 oz)  05/02/24 : 49.8 kg (109 lb 12.8 oz)  ...  07/20/23 : 52.2 kg (115 lb)  01/16/23 : 52.3 kg (115 lb 3.2 oz)  ...  02/02/21 : 54.4 kg (120 lb)  10/30/20 : 54.4 kg (120 lb)  07/27/20 : 53.5 kg (118 lb)    Dosing Weight: based on actual wt    ASSESSED NUTRITION NEEDS  Estimated Energy Needs: 1444 kcals/day (Ambrocio Puckett)  Justification: Repletion  Estimated " Protein Needs: 68 grams protein/day ( kg*1.5 )  Justification: Repletion  Estimated Fluid Needs: 1400 mL/day (1 mL/kcal)  Justification: estimate: or Per provider pending fluid status    SYSTEM AND PHYSICAL FINDINGS   GI symptoms: abd discomfort, BM 4/1  Skin/wounds: Declined dual RN skin assessment    MALNUTRITION  % Intake: </=50% for >/= 1 month (severe)  % Weight Loss: > 7.5% in 3 months (severe)   Subcutaneous Fat Loss: did not assess per meets criteria via above, pt disinterested in visit  Muscle Loss: did not assess per meets criteria via above, pt disinterested in visit  Fluid Accumulation/Edema: None noted  Malnutrition Diagnosis: Severe malnutrition in the context of chronic illness  Malnutrition Present on Admission: Yes    NUTRITION DIAGNOSIS  Severe malnutrition in the context of chronic illness    INTERVENTIONS  None: pt declined offer for supplements, stated nutrition education previously given    GOALS  Patient to consume % of nutritionally adequate meal trays TID, or the equivalent with supplements/snacks.     MONITORING/EVALUATION  Progress toward goals will be monitored and evaluated per policy.

## 2025-04-01 NOTE — PLAN OF CARE
Goal Outcome Evaluation:    A&Ox4. Room air. Reports 8/10 abdomen and back pain. Pain managed with prn dilaudid x2. Denies nausea and shortness of breath. Voiding yellow urine with reagan. Ileostomy has brown, green liquid output. Declined dual RN skin assessment.

## 2025-04-01 NOTE — PROGRESS NOTES
[facility]on [facility]al [facility]  Daily Progress Note    Subjective  No acute events overnight. Pain well controlled. Denies n/v.     Objective  Intake/Output last 24 hrs:    Intake/Output Summary (Last 24 hours) at 4/1/2025 1227  Last data filed at 4/1/2025 0900  Gross per 24 hour   Intake 2212 ml   Output 2305 ml   Net -93 ml     Temp:  [95.9  F (35.5  C)-99.3  F (37.4  C)] 98.6  F (37  C)  Pulse:  [] 86  Resp:  [13-22] 18  BP: ()/(53-61) 128/59  SpO2:  [97 %-100 %] 97 %    Physical Exam:  A&Ox3, NAD  Respirations unlabored  Abdomen soft, non distended, appropriately tender, incisions c/d/I  Stoma pink with bilious output    Pertinent Labs  Lab Results: personally reviewed.  Lab Results   Component Value Date     04/01/2025    CO2 24 04/01/2025    BUN 6.1 04/01/2025     Lab Results   Component Value Date    WBC 11.8 04/01/2025    HGB 10.5 04/01/2025    HGB 12.4 03/31/2025    HCT 31.6 04/01/2025    MCV 89 04/01/2025     04/01/2025       Assessment/Plan: This is a 69 year old female POD #1 s/p robotic ultra low LAR and DLI  Ok for LFD  Minimize IVF  Po pain meds  Lovenox - teaching as will go home on this  [place] x 3 days  Wocn teaching  ambulation    Ekaterina Ramos MD  [facility]  [phone]..............................main

## 2025-04-01 NOTE — CONSULTS
[facility] Nurse Inpatient Assessment     Consulted for: loop ileostomy    Summary:     [facility] nurse follow-up plan: daily    Patient History (according to provider note(s):    Brief Operative Note     Pre-operative diagnosis:         History of rectal cancer [Z85.048]  Post-operative diagnosis        Same as pre-operative diagnosis     Procedure:      ROBOTIC LOW ANTERIOR RESECTION, WITH DIVERTERTING LOOP ILEOSTOMY, N/A - Abdomen     Surgeon:         Surgeons and Role:     * Ekaterina Ramos MD - Primary     * Merary Figueroa PA-C - Assisting     * Gita Saleh MD - Assisting      Assessment:      Assessment of new loop Ileostomy:  Diagnosis Pertinent to Stoma: Cancer - Colon or Rectum      Surgery Date: 3/31  Surgeon:Richard       Hospital: [facility]  Pouching system in place on assessment today: Harshal one piece and flat   Pouch barrier status: intact and Minimal melting  Pouch last changed/wear time: post op pouch  Reason for pouch change today: ostomy education  Effectiveness of current pouching/ supply plan: Recommend continuing current plan  Change made with ostomy management today: No  Pouching system placed today: Harshal one piece, flat, and barrier ring   Supplies: gathered  Last photo: NA  Stoma location: RLQ  Stoma size: 29mm,   Stoma appearance: viable, red, round, and moist  Mucocutaneous junction:  intact  Peristomal complication(s): none   Output: thin  Output volume emptied during visit: 20ml  Abdominal assessment: Soft  Surgical site(s): dressing dry and intact  NG still in place? No  Pain: Dull ache  Is patient still on a PCA? No    Ostomy education assessment:  Participant of teaching session today: patient   Education completed today: Initial fitting, Stoma assessment, Pouching system assessment , Pouch change demonstration, Ostomy accessory product use , Introduction to pouches, Pouch emptying demonstration, Intake and output recording, Fluid and  "electrolyte balance , Importance of hydration, Low fiber diet , Odor/flatus management , and Lifestyle adjustments   Educational materials/methods: Verbal, Written, Demonstration,  hand out, FOD [place] education hand out, and Product sample  Education still needed: Pouch change return demonstration, Peristomal skin care, Pouch emptying return demonstration, Infection prevention/hygiene , Hernia prevention, and Discharge instructions  Learning Comprehension:   Psychosocial assessment: in good spirits  Patient readiness for education today: observing, attentive, and active participation  Following today's visit: patient  is able to demonstrate;         1. How to empty their pouch? Demo provided         2. How to change their pouch? Demo provided         3. How to read and record intake and output correctly? Demo provided   Preparation for discharge completed: No discharge preparation started yet  Preparation for discharge still needed: Placed prescription recommendations in discharge navigator for MD to sign, Ensured patient has extra supplies for discharge, Discussed how to order supplies after discharge , Ordered samples from  after gaining consent from patient/caregiver, Discussed how and when to make an outpatient WO nurse appointment after discharge, Prepared for discharge home with home care, and Discuss signs/symptoms of when to seek medical attention  Pt support system on discharge:   WOC recommend home care? Yes  Face to face time: 35 minutes    Session set up with  for Wednesday.     Treatment Plan:     RLQ Ileostomy pouching plan:   Pouching system: ostomy supplies pouches: [place] Flat FECAL (310311)   Accessories used: [facility] ostomy accessories: 2\" Cera Barrier Ring ([id number])   Frequency of pouch changes: Twice weekly and PRN leakage  WO follow up plan: Daily Monday-Friday (as able)  Bedside RN interventions: Change pouch PRN if leaking using the supplies above, " Empty pouch when 1/3 to 1/2 full, ensure to clean pouch outlet after emptying to prevent odor, Notify WOC for ongoing pouch leakage, Document stoma appearance and output volume, color, and consistency every shift, Encourage patient to empty pouch with assist, and Assist patient to measure and record output     Orders: Written    RECOMMEND PRIMARY TEAM ORDER: None, at this time  Education provided: plan of care  Discussed plan of care with: Patient and Nurse  Notify WOC if wound(s) deteriorate.  Nursing to notify the Provider(s) and re-consult the WOC Nurse if new skin concern.    DATA:     Current support surface: Standard  Standard gel mattress (Isoflex)  Containment of urine/stool: Continent of bladder and Ileostomy pouch  BMI: Body mass index is 17.71 kg/m .   Active diet order: Orders Placed This Encounter      Low Fiber Diet     Output: I/O last 3 completed shifts:  In: 1852 [P.O.:1040; I.V.:812]  Out: 2760 [Urine:2650; Stool:110]     Labs:   Recent Labs   Lab 04/01/25  0623   HGB 10.5*   WBC 11.8*     Pressure injury risk assessment:   Sensory Perception: 4-->no impairment  Moisture: 4-->rarely moist  Activity: 3-->walks occasionally  Mobility: 3-->slightly limited  Nutrition: 3-->adequate  Friction and Shear: 2-->potential problem  Dean Score: 19    GARLAND DemarcoN RN CWOCN  Pager no longer in use, please contact through [employer] group: [facility] [employer]

## 2025-04-01 NOTE — PLAN OF CARE
Problem: Pain Acute  Goal: Optimal Pain Control and Function  Outcome: Progressing   Goal Outcome Evaluation:    Pt denies pain.  Independent to the bathroom.  Pt has Ileostomy and also had mucous stool from rectum.  Pt stated that wo saw her and planned to see her everyday while she's here.  On Mg and K+ protocol.  Received K+ replacement and will recheck level at 1830.

## 2025-04-02 LAB
GLUCOSE BLDC GLUCOMTR-MCNC: 105 MG/DL (ref 70–99)
GLUCOSE BLDC GLUCOMTR-MCNC: 112 MG/DL (ref 70–99)
GLUCOSE BLDC GLUCOMTR-MCNC: 114 MG/DL (ref 70–99)
GLUCOSE BLDC GLUCOMTR-MCNC: 123 MG/DL (ref 70–99)
HOLD SPECIMEN: NORMAL
MAGNESIUM SERPL-MCNC: 1.8 MG/DL (ref 1.7–2.3)
POTASSIUM SERPL-SCNC: 3.4 MMOL/L (ref 3.4–5.3)
POTASSIUM SERPL-SCNC: 3.6 MMOL/L (ref 3.4–5.3)

## 2025-04-02 PROCEDURE — 8E0W4CZ ROBOTIC ASSISTED PROCEDURE OF TRUNK REGION, PERCUTANEOUS ENDOSCOPIC APPROACH: ICD-10-PCS | Performed by: COLON & RECTAL SURGERY

## 2025-04-02 PROCEDURE — 83735 ASSAY OF MAGNESIUM: CPT | Performed by: COLON & RECTAL SURGERY

## 2025-04-02 PROCEDURE — 250N000013 HC RX MED GY IP 250 OP 250 PS 637

## 2025-04-02 PROCEDURE — 0DTP4ZZ RESECTION OF RECTUM, PERCUTANEOUS ENDOSCOPIC APPROACH: ICD-10-PCS | Performed by: COLON & RECTAL SURGERY

## 2025-04-02 PROCEDURE — 0DJD8ZZ INSPECTION OF LOWER INTESTINAL TRACT, VIA NATURAL OR ARTIFICIAL OPENING ENDOSCOPIC: ICD-10-PCS | Performed by: COLON & RECTAL SURGERY

## 2025-04-02 PROCEDURE — 84132 ASSAY OF SERUM POTASSIUM: CPT | Performed by: COLON & RECTAL SURGERY

## 2025-04-02 PROCEDURE — 120N000001 HC R&B MED SURG/OB

## 2025-04-02 PROCEDURE — 250N000011 HC RX IP 250 OP 636

## 2025-04-02 PROCEDURE — 0D1B4Z4 BYPASS ILEUM TO CUTANEOUS, PERCUTANEOUS ENDOSCOPIC APPROACH: ICD-10-PCS | Performed by: COLON & RECTAL SURGERY

## 2025-04-02 PROCEDURE — 0DTN4ZZ RESECTION OF SIGMOID COLON, PERCUTANEOUS ENDOSCOPIC APPROACH: ICD-10-PCS | Performed by: COLON & RECTAL SURGERY

## 2025-04-02 PROCEDURE — 36415 COLL VENOUS BLD VENIPUNCTURE: CPT | Performed by: COLON & RECTAL SURGERY

## 2025-04-02 PROCEDURE — 250N000013 HC RX MED GY IP 250 OP 250 PS 637: Performed by: COLON & RECTAL SURGERY

## 2025-04-02 PROCEDURE — G0463 HOSPITAL OUTPT CLINIC VISIT: HCPCS

## 2025-04-02 RX ORDER — ENOXAPARIN SODIUM 100 MG/ML
30 INJECTION SUBCUTANEOUS EVERY 24 HOURS
Qty: 7.5 ML | Refills: 0 | Status: SHIPPED | OUTPATIENT
Start: 2025-04-03 | End: 2025-04-28

## 2025-04-02 RX ORDER — POTASSIUM CHLORIDE 1500 MG/1
20 TABLET, EXTENDED RELEASE ORAL ONCE
Status: COMPLETED | OUTPATIENT
Start: 2025-04-02 | End: 2025-04-02

## 2025-04-02 RX ADMIN — ACETAMINOPHEN 1000 MG: 500 TABLET ORAL at 22:05

## 2025-04-02 RX ADMIN — ACETAMINOPHEN 1000 MG: 500 TABLET ORAL at 10:21

## 2025-04-02 RX ADMIN — ATORVASTATIN CALCIUM 40 MG: 40 TABLET, FILM COATED ORAL at 20:39

## 2025-04-02 RX ADMIN — POTASSIUM CHLORIDE 20 MEQ: 1500 TABLET, EXTENDED RELEASE ORAL at 00:33

## 2025-04-02 RX ADMIN — ACETAMINOPHEN 1000 MG: 500 TABLET ORAL at 18:17

## 2025-04-02 RX ADMIN — ENOXAPARIN SODIUM 30 MG: 30 INJECTION SUBCUTANEOUS at 10:21

## 2025-04-02 ASSESSMENT — ACTIVITIES OF DAILY LIVING (ADL)
ADLS_ACUITY_SCORE: 40
ADLS_ACUITY_SCORE: 42
ADLS_ACUITY_SCORE: 42
ADLS_ACUITY_SCORE: 40
ADLS_ACUITY_SCORE: 42
ADLS_ACUITY_SCORE: 42
ADLS_ACUITY_SCORE: 40
ADLS_ACUITY_SCORE: 42
ADLS_ACUITY_SCORE: 41
ADLS_ACUITY_SCORE: 42
ADLS_ACUITY_SCORE: 40
ADLS_ACUITY_SCORE: 42
ADLS_ACUITY_SCORE: 40
ADLS_ACUITY_SCORE: 40
ADLS_ACUITY_SCORE: 42
ADLS_ACUITY_SCORE: 40
ADLS_ACUITY_SCORE: 40
ADLS_ACUITY_SCORE: 42
ADLS_ACUITY_SCORE: 42
ADLS_ACUITY_SCORE: 40
ADLS_ACUITY_SCORE: 42

## 2025-04-02 NOTE — PROGRESS NOTES
[facility]  Daily Progress Note    Subjective  Patient reports feeling great this morning. Really not having any pain, just some aching when she gets out of bed. Has not needed oxycodone, and using Tylenol infrequently. Tolerating LFD without nausea or vomiting. Stoma productive of 550 mL. Reagan with adequate output. AVSS.     Objective  Intake/Output last 24 hrs:    Intake/Output Summary (Last 24 hours) at 4/2/2025 0843  Last data filed at 4/2/2025 0517  Gross per 24 hour   Intake 720 ml   Output 2875 ml   Net -2155 ml     Temp:  [98  F (36.7  C)-98.8  F (37.1  C)] 98  F (36.7  C)  Pulse:  [88-96] 88  Resp:  [18-20] 20  BP: (118-138)/(57-63) 138/63  SpO2:  [92 %-96 %] 96 %    Physical Exam:  General: awake, alert, lying in bed, in no acute distress  Head: normocephalic, atraumatic  Respiratory: non-labored breathing  Abdomen: soft, non tender, non-distended              Incisions: clean, dry and intact   Stoma: pink and viable, loose green output in bag  : reagan catheter in place draining clear yellow urine  Skin: No rashes or lesions  Musculoskeletal: moves all four extremities equally  Psychological: alert and oriented, answers questions appropriately    Pertinent Labs  Lab Results: personally reviewed.  Lab Results   Component Value Date     04/01/2025    CO2 24 04/01/2025    BUN 6.1 04/01/2025     Lab Results   Component Value Date    WBC 11.8 04/01/2025    HGB 10.5 04/01/2025    HGB 12.4 03/31/2025    HCT 31.6 04/01/2025    MCV 89 04/01/2025     04/01/2025       Assessment/Plan: This is a 69 year old female POD #2 s/p robotic LAR and DLI for rectal cancer    - Continue low fiber diet  - Reagan x 3 days, will remove tomorrow  - Lovenox DVT ppx, will go with extended course so please do teaching  - WOCN stoma teaching  - Pathology pending  - Tentatively plan for home tomorrow after reagan removal and if patient feeling comfortable with stoma cares  - SW/CM consult placed for [facility] for  new stoma    Discussed with Dr. Richard Wallace PA-C  [facility]  [phone]..............................main

## 2025-04-02 NOTE — CONSULTS
"RNCM went to talk with pt about home RN for new stoma. Pt stated, \"I just worked with the wound care nurse, she said I did really good, and I feel I really dont need a home RN.\" Pt stated, \"she gave me enough wound care supplies to get me through to my wound nurse follow up appointment.\" Pt Ind and lives with spouse Rahul. Declines any CM needs.       Josette Hays RN on 4/2/2025 at 2:50 PM    "

## 2025-04-02 NOTE — PROGRESS NOTES
[facility] Nurse Inpatient Assessment     Consulted for: loop ileostomy    Summary:     [facility] nurse follow-up plan: daily    Patient History (according to provider note(s):    Brief Operative Note     Pre-operative diagnosis:         History of rectal cancer [Z85.048]  Post-operative diagnosis        Same as pre-operative diagnosis     Procedure:      ROBOTIC LOW ANTERIOR RESECTION, WITH DIVERTERTING LOOP ILEOSTOMY, N/A - Abdomen     Surgeon:         Surgeons and Role:     * Ekaterina Ramos MD - Primary     * Merary Figueroa PA-C - Assisting     * Gita Saleh MD - Assisting      Assessment:      Assessment of new loop Ileostomy:  Diagnosis Pertinent to Stoma: Cancer - Colon or Rectum      Surgery Date: 3/31  Surgeon:Richard       Hospital: [facility]  Pouching system in place on assessment today: Harshal one piece and flat   Pouch barrier status: intact and Minimal melting  Pouch last changed/wear time: 1 day  Reason for pouch change today: ostomy education  Effectiveness of current pouching/ supply plan: Recommend continuing current plan  Change made with ostomy management today: No  Pouching system placed today: Harshal one piece, flat, and barrier ring   Supplies: gathered    4/2    Last photo: 4/2  Stoma location: RLQ  Stoma size: 29mm,   Stoma appearance: viable, red, round, and moist  Mucocutaneous junction:  intact  Peristomal complication(s): none   Output: thin  Output volume emptied during visit: 100ml  Abdominal assessment: Soft  Surgical site(s): dressing dry and intact  NG still in place? No  Pain: Dull ache  Is patient still on a PCA? No    Ostomy education assessment:  Participant of teaching session today: patient   Education completed today: Pouch change return demonstration, Peristomal skin care, Pouch emptying return demonstration, When to seek medical attention, Infection prevention/hygiene , Hernia prevention, and Discharge instructions  Educational  "materials/methods: Verbal, Written, Demonstration,  hand out, FOD [place] education hand out, and Product sample  Education still needed:  All education completed, patient is planning on discharging tomorrow.   Learning Comprehension:   Psychosocial assessment: in good spirits  Patient readiness for education today: attentive and active participation  Following today's visit: patient  is able to demonstrate;         1. How to empty their pouch? Yes and with minimal assist        2. How to change their pouch? Yes and with minimal assist        3. How to read and record intake and output correctly? Yes  Preparation for discharge completed: Placed prescription recommendations in discharge navigator for MD to sign, Ensured patient has extra supplies for discharge, Discussed how to order supplies after discharge , Ordered samples from  after gaining consent from patient/caregiver, Discussed how and when to make an outpatient WOC nurse appointment after discharge, Prepared for discharge home with home care, and Discuss signs/symptoms of when to seek medical attention  Preparation for discharge still needed: completed  Pt support system on discharge:   WOC recommend home care? No  Face to face time: 45 minutes    Patient was able to stand at sink to change pouch and was able to sit on toilet to empty independently with verbal cues only.     Treatment Plan:     RLQ Ileostomy pouching plan:   Pouching system: ostomy supplies pouches: Harshal Flat FECAL ([id number])   Accessories used: [facility] ostomy accessories: 2\" Cera Barrier Ring ([id number])   Frequency of pouch changes: Twice weekly and PRN leakage  WOC follow up plan: Daily Monday-Friday (as able)  Bedside RN interventions: Change pouch PRN if leaking using the supplies above, Empty pouch when 1/3 to 1/2 full, ensure to clean pouch outlet after emptying to prevent odor, Notify WOC for ongoing pouch leakage, Document stoma appearance and output " volume, color, and consistency every shift, Encourage patient to empty pouch with assist, and Assist patient to measure and record output     Orders: Written    RECOMMEND PRIMARY TEAM ORDER: None, at this time  Education provided: plan of care  Discussed plan of care with: Patient and Nurse  Notify [facility] if wound(s) deteriorate.  Nursing to notify the Provider(s) and re-consult the [facility] Nurse if new skin concern.    DATA:     Current support surface: Standard  Standard gel mattress (Isoflex)  Containment of urine/stool: Continent of bladder and Ileostomy pouch  BMI: Body mass index is 17.71 kg/m .   Active diet order: Orders Placed This Encounter      Low Fiber Diet      Diet     Output: I/O last 3 completed shifts:  In: 480 [P.O.:480]  Out: 1625 [Urine:1175; Stool:450]     Labs:   Recent Labs   Lab 04/01/25  0623   HGB 10.5*   WBC 11.8*     Pressure injury risk assessment:   Sensory Perception: 4-->no impairment  Moisture: 4-->rarely moist  Activity: 3-->walks occasionally  Mobility: 3-->slightly limited  Nutrition: 3-->adequate  Friction and Shear: 2-->potential problem  Dean Score: 19    MARIAMA Demarco RN CWOCN  Pager no longer in use, please contact through [employer] group: [facility] [employer]            Ostomy Discharge Instructions/Orders    Hospital:  [facility]    Ostomy Clinic Follow-up:  Please arrive 10 - 15 minutes prior to your ostomy clinic appointment, so you can complete the registration process.  [facility] should contact you to follow up with their ostomy nurse practitioner for post-operative follow up, stoma assessment and ostomy supply determination/order. This generally occurs 2 - 4 weeks after hospital discharge. If you have not received a call from them, please contact [facility] at [phone] to schedule an appointment Location: [address] [place] [address], [address], [place] [address] ([facility] [address] [facility]).  Follow up  2 - 4 weeks post-op for a stoma site assessment - your appointment has been scheduled, see future appointments section for date and time    Pouch Change Instructions:  1. Change appliance twice weekly and as needed for any leakage.  Notify the [facility] Nurse if changing pouch more often than daily or if skin is itchy.  2. Empty pouch when no more than 1/3 to 1/2 full (solid, liquid, gas).  3. May shower with pouch on or off, removing pouch/ barrier down to the skin is ok. (Note: fecal output can not be controlled, so there may be occasional clean up if you choose to shower without a pouch.)    The pouching procedure:  1.  Use the  Pouch Change Instruction  sheet to gather and organize supplies  2.  Trace old pattern onto new pouch and cut out new opening on new barrier.  3.  Remove old pouch, observe for any leakage.  4.  Clean skin with water and paper towel.  5.  Apply Ring around stoma. (May use other products as instructed by [facility] nurse at this time.)  6.  Apply prepared barrier to the clean skin and press into place.  7.  Hold hand on pouch/over stoma to warm pouch (2 - 5 minutes) to help adherence.    Ostomy products to use at hospital discharge (supplies sent home from hospital, use until new supplies arrive):  Harshal 1 piece CTF flat #9578  3M Cavilon No-Sting skin barrier #8711 and Harshal Adapt Cera Ring #2572    ABC's of Ostomy Care  Activity:  Walk short distances & increase as your strength allows  You may climb stairs  Do not do strenuous exercise or activity such as golfing or heavy lifting  Do not drive until approved by your doctor  Carry an extra pouching system with you, be prepared (do not leave this in the car as weather may adversely affect the adhesive)    Bathing:  You may shower with your pouch on; Dry under the pouch after the shower  If it is your change day, you may remove pouch and shower without it    Clothing:  Wear loose clothing for comfort in the immediate post-op period  No leather  belts should be worn near the stoma, as it can cause an injury to the stoma mucosa    Diet:  Consume foods throughout the day that will help to thicken output and therefore help maintain a good seal.    Consider these foods:  Grains such as pasta, rice, soda crackers ,pretzels, cheese and yogurt, applesauce, bananas, potatoes, peanut butter and tapioca  Some foods increase/thin your output: Sugars,Prunes, Raisins  Eat 3-4 servings of protein per day  No timed release medications    Avoiding a blockage:  Eat 6 small meals/day; small bites and chew well  Avoid raw vegetables, seeds, nuts peels, grapefruit, oranges, pineapple, fruit peelings (e.g. apple)  Avoid tough meats like jerkies and meat with casings; ground meat is easier to digest, chew other cuts of meat well  If the food cannot be cut easily with a fork avoid for now.  IF you have a blockage try to gently massage blockage, do NOT take a laxative and call MD.    Fluids:  Drink plenty of fluids, minimally  8 cups of non-caffeinated beverage per day  plus if you empty your pouch, have another glass of fluid  Remember caffeine and alcohol makes you more dehydrated, be sure to add in more fluids if consuming caffeine or alcohol  Monitor the color of your urine, if getting too dark, need more fluids    Avoiding dehydration:  Remember caffeine and alcohol makes you more dehydrated, be sure to add in more fluids if consuming caffeine or alcohol  Monitor the color of your urine, if getting too dark increase fluid intake  Keep your input/output in balance and notify MD if out of balance  If you feel you are getting dehydrated then consider drinking Gatorade/Pedialyte or similar beverage    Stoma Injury or Concern:  Call the [facility] Nurse to report the following concerns (see phone number above):  Skin concerns around the stoma: red, rash, open areas  Concerns with the pouch opening being too small or too large  Bulging around the stoma without pain and with continued  output  Pouch leaking daily or more frequently    Call the surgeon or go to the emergency department for the following concerns:  Stoma turns blue or darker in color  Bulging around the stoma with no output; likely will have pain as well      Supply Ordering after hospital discharge:  Upon discharge from the hospital you will be sent home with ostomy supplies.   These supplies are to be used for your twice weekly stoma site care while you order/set up your monthly supply order.     If you have been set up for home care visits the home home care nurse will help you coordinate ordering supplies.      If you have not been set up for home care then make sure to make a follow up appointment with the WOCN nurse to reassess your abdomen and ostomy for changes and determine the correct plan.  At that time ordering supplies will be reviewed.      Supply ordering:  You are responsible to order ostomy supplies after discharge from the hospital.  Please contact your medical supply company and give them the information detailed below related to ostomy supplies. The medical supply company will give further instructions if necessary.    You may choose whichever ostomy product supplier you desire. Some more common suppliers/contact information are listed below:  [employer] Phone: [phone] ext. [phone]; Fax: [phone] (local company with store near [facility] and [facility] [address] in [place])  [facility] [employer] [facility] Ph: [phone] ext. [phone]  [employer]   Phone: [phone]  [employer] Phone: [phone]. [phone]    Your Medical Supplier will need your surgeon's name, phone and fax number:  Clinic.orders: [facility] ([facility]) Phone: [phone]; Fax: [phone] ([place]) or [phone] ([place])    Surgery date:  3/31  Surgeon:  Richard  Procedure:  Robotic LAR, with diverting loop ileostomy  Related diagnosis:  rectal cancer    Recommended supplies to order:  Pouches: Fecal Pouches One Piece: Harshal  "Flat [facility] #[id number]   Accessories:Ring/Paste: Harshal Adapt Cera 2\" Ring #[id number]    Authorizing MD: Dr. Ramos    Verbal Order for Ostomy Supplies for 1 month per:   Signature: Naye Ramos      Additional Ostomy Resources:  Support Groups:   Ost[place]ym[place].org - [employer] Ostomy [employer] [place] - email newsletters and in-person monthly meetings with virtual option   2.   [employer]stomy[employer] - [facility] ostomy support group - newsletters and in-person monthly meetings     Virtual Ostomy Support  [employer]- [email or web address]://[email or web address].co/    Common Product Manufacturers:  Deep Glint/en/ostomycare  2.   Coloplast.us/ostomy  3.   Mytopia/ostomy    Ostomy Support Products:  Stealthbelt.[employer] - pouch supports/covers  2.   Ostobuddy - smart phone milagro to help manage ostomy cares           "

## 2025-04-02 NOTE — PLAN OF CARE
Problem: Pain Acute  Goal: Optimal Pain Control and Function  Outcome: Progressing   Goal Outcome Evaluation:  Pt is alert and oriented, able to make needs known. Pt denies any pain or discomfort. Potassium 3.4. replacement given, recheck this morning.  Pt denies any nausea. Mendoza patent with good output. Pt is up independently in her room. Colostomy putting out flatus and liquid green stool.   January Urbina RN                         Xray Pelvis AP only

## 2025-04-02 NOTE — PLAN OF CARE
Problem: Adult Inpatient Plan of Care  Goal: Optimal Comfort and Wellbeing  Outcome: Progressing  Intervention: Monitor Pain and Promote Comfort  Recent Flowsheet Documentation  Taken 4/2/2025 1021 by Joana Colón RN  Pain Management Interventions: medication (see MAR)     Problem: Pain Acute  Goal: Optimal Pain Control and Function  Outcome: Progressing  Intervention: Develop Pain Management Plan  Recent Flowsheet Documentation  Taken 4/2/2025 1021 by Joana Colón, RN  Pain Management Interventions: medication (see MAR)  Intervention: Prevent or Manage Pain  Recent Flowsheet Documentation  Taken 4/2/2025 1059 by Joana Colón RN  Medication Review/Management: medications reviewed   Goal Outcome Evaluation:    Pt is rating her pain 2/10.  Gave schedule Tylenol 1000 mg.  Mendoza catheter is patent.  Ileostomy had good output.  Blood glucose 112 and 114.  On mg and K+ protocol.  Labs recheck in AM.  Pt stated that she's comfortable changing and emptying her ileostomy herself.  Pt was told to record on white board when she empty her ileostomy and remind nursing staffs to record it.

## 2025-04-02 NOTE — PLAN OF CARE
Problem: Pain Acute  Goal: Optimal Pain Control and Function  Intervention: Prevent or Manage Pain  Recent Flowsheet Documentation  Taken 4/1/2025 1603 by Dione Hale, RN  Medication Review/Management: medications reviewed     Problem: Nausea and Vomiting  Goal: Nausea and Vomiting Relief  Outcome: Progressing   Goal Outcome Evaluation:       Pt alert and oriented x4, able to make needs known.  Up ambulating independently in room.  Tolerating low fiber diet.  Good oral intake.  Ileostomy having brown liquid stool.  Denies any pain or nausea, receiving scheduled tylenol this shift, pt refused scheduled gabapentin.    Dione Hale, RN

## 2025-04-02 NOTE — DISCHARGE INSTRUCTIONS
Ostomy Discharge Instructions/Orders    Hospital:  [facility]    Ostomy Clinic Follow-up:  Please arrive 10 - 15 minutes prior to your ostomy clinic appointment, so you can complete the registration process.  [facility] should contact you to follow up with their ostomy nurse practitioner for post-operative follow up, stoma assessment and ostomy supply determination/order. This generally occurs 2 - 4 weeks after hospital discharge. If you have not received a call from them, please contact [facility] at [phone] to schedule an appointment Location: [address] [place] [address], [address], [place] [address] (Near [facility] and [facility]).  Follow up 2 - 4 weeks post-op for a stoma site assessment - your appointment has been scheduled, see future appointments section for date and time    Pouch Change Instructions:  1. Change appliance twice weekly and as needed for any leakage.  Notify the [facility] Nurse if changing pouch more often than daily or if skin is itchy.  2. Empty pouch when no more than 1/3 to 1/2 full (solid, liquid, gas).  3. May shower with pouch on or off, removing pouch/ barrier down to the skin is ok. (Note: fecal output can not be controlled, so there may be occasional clean up if you choose to shower without a pouch.)    The pouching procedure:  1.  Use the  Pouch Change Instruction  sheet to gather and organize supplies  2.  Trace old pattern onto new pouch and cut out new opening on new barrier.  3.  Remove old pouch, observe for any leakage.  4.  Clean skin with water and paper towel.  5.  Apply Ring around stoma. (May use other products as instructed by [facility] nurse at this time.)  6.  Apply prepared barrier to the clean skin and press into place.  7.  Hold hand on pouch/over stoma to warm pouch (2 - 5 minutes) to help adherence.    Ostomy products to use at hospital discharge (supplies sent home from hospital, use until new supplies arrive):  Harshal 1 piece CTF flat #4280  3M  Cavilon No-Sting skin barrier #9836 and Harshal Adapt Cera Ring #[id number]    ABC's of Ostomy Care  Activity:  Walk short distances & increase as your strength allows  You may climb stairs  Do not do strenuous exercise or activity such as golfing or heavy lifting  Do not drive until approved by your doctor  Carry an extra pouching system with you, be prepared (do not leave this in the car as weather may adversely affect the adhesive)    Bathing:  You may shower with your pouch on; Dry under the pouch after the shower  If it is your change day, you may remove pouch and shower without it    Clothing:  Wear loose clothing for comfort in the immediate post-op period  No leather belts should be worn near the stoma, as it can cause an injury to the stoma mucosa    Diet:  Consume foods throughout the day that will help to thicken output and therefore help maintain a good seal.    Consider these foods:  Grains such as pasta, rice, soda crackers ,pretzels, cheese and yogurt, applesauce, bananas, potatoes, peanut butter and tapioca  Some foods increase/thin your output: Sugars,Prunes, Raisins  Eat 3-4 servings of protein per day  No timed release medications    Avoiding a blockage:  Eat 6 small meals/day; small bites and chew well  Avoid raw vegetables, seeds, nuts peels, grapefruit, oranges, pineapple, fruit peelings (e.g. apple)  Avoid tough meats like jerkies and meat with casings; ground meat is easier to digest, chew other cuts of meat well  If the food cannot be cut easily with a fork avoid for now.  IF you have a blockage try to gently massage blockage, do NOT take a laxative and call MD.    Fluids:  Drink plenty of fluids, minimally  8 cups of non-caffeinated beverage per day  plus if you empty your pouch, have another glass of fluid  Remember caffeine and alcohol makes you more dehydrated, be sure to add in more fluids if consuming caffeine or alcohol  Monitor the color of your urine, if getting too dark, need more  fluids    Avoiding dehydration:  Remember caffeine and alcohol makes you more dehydrated, be sure to add in more fluids if consuming caffeine or alcohol  Monitor the color of your urine, if getting too dark increase fluid intake  Keep your input/output in balance and notify MD if out of balance  If you feel you are getting dehydrated then consider drinking Gatorade/Pedialyte or similar beverage    Stoma Injury or Concern:  Call the [facility] Nurse to report the following concerns (see phone number above):  Skin concerns around the stoma: red, rash, open areas  Concerns with the pouch opening being too small or too large  Bulging around the stoma without pain and with continued output  Pouch leaking daily or more frequently    Call the surgeon or go to the emergency department for the following concerns:  Stoma turns blue or darker in color  Bulging around the stoma with no output; likely will have pain as well      Supply Ordering after hospital discharge:  Upon discharge from the hospital you will be sent home with ostomy supplies.   These supplies are to be used for your twice weekly stoma site care while you order/set up your monthly supply order.     If you have been set up for home care visits the home home care nurse will help you coordinate ordering supplies.      If you have not been set up for home care then make sure to make a follow up appointment with the [facility] nurse to reassess your abdomen and ostomy for changes and determine the correct plan.  At that time ordering supplies will be reviewed.      Supply ordering:  You are responsible to order ostomy supplies after discharge from the hospital.  Please contact your medical supply company and give them the information detailed below related to ostomy supplies. The medical supply company will give further instructions if necessary.    You may choose whichever ostomy product supplier you desire. Some more common suppliers/contact information are listed  "below:  [employer] Phone: [phone] ext. [phone]; Fax: [phone] (local company with store near [facility] and [address] in [place])  [facility] Ph: [phone] ext. [phone]  [employer]   Phone: [phone]  [employer] Phone: [phone] ext. [phone]    Your Medical Supplier will need your surgeon's name, phone and fax number:  Clinic.orders: [facility] ([facility]) Phone: [phone]; Fax: [phone] ([place]) or [phone] ([place])    Surgery date:  3/31  Surgeon:  Richard  Procedure:  Robotic LAR, with diverting loop ileostomy  Related diagnosis:  rectal cancer    Recommended supplies to order:  Pouches: Fecal Pouches One Piece: [place] Flat CTF #1189   Accessories:Ring/Paste: Harshal Adapt Cera 2\" Ring #[id number]    Authorizing MD: Dr. Ramos    Verbal Order for Ostomy Supplies for 1 month per:   Signature: Naye Ramos      Additional Ostomy Resources:  Support Groups:   Ostomym[place].[email or web address] - [facility] Association [place] - email newsletters and in-person monthly meetings with virtual option   2.   [employer] - [facility] ostomy support group - newsletters and in-person monthly meetings     Virtual Ostomy Support  [employer]- [email or web address]://www.Ingenious Med.co/    Common Product Manufacturers:  [employer]/en/ostomycare  2.   Coloplast.us/ostomy  3.   [employer]/ostomy    Ostomy Support Products:  Stealthbelt.com - pouch supports/covers  2.   [employer]st[employer] - smart phone milagro to help manage ostomy cares       "

## 2025-04-02 NOTE — PROCEDURES
Colon and Rectal Surgery Operative Note    DATE OF SERVICE: 3/31/2025     DATE OF SURGERY: 3/31/2025     PREOPERATIVE DIAGNOSIS:  mid rectal cancer     POSTOPERATIVE DIAGNOSIS:  same     PROCEDURE:  1. Robotic low anterior resection  2. Diverting loop ileostomy  3. Rigid proctoscopy     SURGEON:  Ekaterina Ramos MD     ASSISTANT:  Dane Morris MD [place] fellow  Merary LUNA     ANESTHESIA:  General.     ESTIMATED BLOOD LOSS:  30 mL     SPECIMENS:  sigmoid and rectum, anastomotic rings     COMPLICATIONS:  None.     FINDINGS: mid rectal cancer, tattoo at peritoneal reflection, very low peritoneal reflection, anastomosis at 4cm from anal verge. Neg leak test. No distant metastatic disease seen    Colon Resection  Operation performed with curative intent Yes   Tumor Location Rectum, NOS   Extent of colon and vascular resection Other distal sigmoid to distal rectum , high ligation of the CARLOS pedicle          INDICATIONS FOR PROCEDURE:  The patient is a 69 year old female with a history of an early mid rectal cancer. Staging workup with a ct chest/abd/pelvis did not show any obvious distant metastatic disease and on MRI she was staged as a T1/T2N0 with neg CRM and EMVI.  We discussed surgical resection. The risks and benefits of surgery were discussed in detail as well as alternatives. We specifically discussed the risk of bleeding, infection, damage to neighboring structures, anastomotic leak, potential need for a stoma, LAR syndrome, blood clot, heart attack, stroke and even death. Informed consent was obtained.     DESCRIPTION OF PROCEDURE:    On 3/31/25 the patient was taken to the operating room and placed under general endotracheal anesthesia. The patient was placed in the lithotomy position on the pink Pigazzi pad.  Both arms were tucked at the sides and the patient was secured to the table with a foam chest strap.  A reagan catheter was inserted by the operating room nurse.  The abdomen was prepped and  draped in the usual sterile fashion.  Antibiotics were given per SCIP protocol and 5000 units of heparin subcutaneous was given. We performed a timeout per protocol.     I began with a LUQ 5mm incision and gained entry with a optical trocar. We gained access to the abdomen and insufflated to 15mmHg. There was no injury from entry. We surveyed the abdomen and there was no obvious distant metastatic disease seen in the liver, pelvis or peritoneum. We placed our additional robotic ports in a diagonal line from the right ASIS towards the subcostal margin, replacing our 5mm optiport. The RLQ port was upsized to a 12mm robotic port and we placed our two fascial closure sutures with a  vicryl suture using a suture passer. These were tagged for the time being. We also placed a 5mm RUQ airseal assist port. We then placed the patient in steep trendelenburg and left side up. We swept the small bowel out of the pelvis. We then docked the robot and placed our robotic instruments under direct vision.     We began by elevating the pedicle of the CARLOS.  I scored the peritoneum of the mesentery and made a window underneath. The left ureter was identified and swept down away from our dissection.  We continued this dissection to the abdominal side wall and then superiorly towards the splenic flexure and the towards the pelvis. We then took down the remaining lateral attachments of the left colon along the white line of Toldt.  I continued to mobilize the left colon proximally. The left colon was mobilized to the level of the splenic flexure.    We later came back to the CARLOS pedicle as we felt this was inhibiting our reach and performed a high ligation with a firing of the robotic stapler whtie load, the pedicle was hemostatic. We also proceeded up to perform a high ligation of the IMV for added reach. We then turned our attention to the pelvis to begin our pelvic dissection. The tattoo was seen at the peritoneal reflection. I used the  monopolar scissors to enter the TME plane. The hypogastric nerves were identified and swept down to preserve. We continued our dissection with electrocautery in the TME plane first posteriorly and then circumferentially.  Eventually, I was able to mobilize all the way down to the pelvic floor. She had a very low peritoneal reflection as we performed a digital rectal exam and were essentially just a couple of cm from the top of the anorectal ring. I cleared off the rectum all the way down to the pelvic floor and felt that I would be able to staple with an adequate distal margin.      We transected the rectum with 2 firings of the 60 mm green load robotic stapler. At this point I tested the redundancy of the colon which after taking the CARLOS and IMV reached down easily for a tension free anastomosis. I identified an area proximal to the CARLOS pedicle where I thought would be good for a conduit. I took the mesentery of this region with a vessel sealer  Anesthesia then administered ICG and our conduit and rectal stump were green and well perfused. We then undocked the robot     I made a small Pfannenstiel incision and then placed a small bree. The distal staple line was brought up and I identified my selected area of distal descending colon for transection.  Two Geovany clamps were placed and I transected the specimen.  It was removed off the field.  It was sent for open and return. On return I could see we had at least a 1cm distal margin to the tumor. The TME looked complete.     I placed a 2-0 double armed Prolene pursestring suture in the proximal end of the colon and secured the anvil of a 29 EEA blue powered stapler inside. We placed an additional 3-0 vicry purse string. This was then returned to the abdomen which was resinsufflated to perform the anastomosis.    My assistant then went below and placed our EEA sizers. The stapler was then brought up, a 29 EEA stapler with the pin brought out just posterior.  The  anvil and pin were mated. We double checked our orientation and we then closed the stapler and the stapler fired.  The anastomotic rings were both intact.  The pelvis was refilled with saline and then the proximal bowel occluded.  Rigid proctoscopy was performed to insufflate again and there was no evidence of bubbling.  The anastomosis measured at about 4 cm from the anal verge. Given how low the anastomosis was, in addition to needing two stapler firings on the rectum we decided to also do a temporary diverting loop ileostomy.      We then identified a spot on the terminal ileum about 20cm proximal to the ileocecal valve. This was then grasped and brought up for diverting loop ileostomy at our preop marked site in the RLQ. We made our trephine in the RLQ, dissecting down to the fascia which was opened vertically bluntly spreading the rectus muscle to expose the posterior sheath opened in a similar fashion. This accommodated about 2 of my fingers. We then brought up this loop of terminal ileum. We again reinsufflated and double checked our orientation and it was straight and not twisted. We then desufflated the abdomen removing our ports and gelport.    We all moved to the closing tray, changing our gowns and gloves.  The skin and subcutaneous tissues were copiously  irrigated.  I closed the Pfannenstiel peritoneum with 2-0 Vicryl suture. We tied down our two prior placed 12mm port fascial sutures. The fascia was closed with 0 PDS x2.  The skin and subcutaneous tissues were irrigated again.The skin was closed with 4-0 Monocryl and Dermabond.      The ileostomy was matured in a loop Vidhya fashion with 3-0 Vicryl sutures.     All sponge, needle and instrument counts were correct x2 at the conclusion of the case.  There were no immediate apparent complications.  The patient was transferred to the recovery room in stable condition.        Ekaterina Ramos MD  [phone]  4/2/2025 10:37 AM

## 2025-04-03 VITALS
WEIGHT: 100 LBS | RESPIRATION RATE: 20 BRPM | HEIGHT: 63 IN | BODY MASS INDEX: 17.72 KG/M2 | SYSTOLIC BLOOD PRESSURE: 128 MMHG | OXYGEN SATURATION: 97 % | DIASTOLIC BLOOD PRESSURE: 60 MMHG | HEART RATE: 99 BPM | TEMPERATURE: 98.1 F

## 2025-04-03 LAB
CREAT SERPL-MCNC: 0.46 MG/DL (ref 0.51–0.95)
EGFRCR SERPLBLD CKD-EPI 2021: >90 ML/MIN/1.73M2
GLUCOSE BLDC GLUCOMTR-MCNC: 119 MG/DL (ref 70–99)
GLUCOSE BLDC GLUCOMTR-MCNC: 86 MG/DL (ref 70–99)
MAGNESIUM SERPL-MCNC: 1.8 MG/DL (ref 1.7–2.3)
PLATELET # BLD AUTO: 385 10E3/UL (ref 150–450)
POTASSIUM SERPL-SCNC: 3.4 MMOL/L (ref 3.4–5.3)

## 2025-04-03 PROCEDURE — 250N000013 HC RX MED GY IP 250 OP 250 PS 637

## 2025-04-03 PROCEDURE — 36415 COLL VENOUS BLD VENIPUNCTURE: CPT | Performed by: COLON & RECTAL SURGERY

## 2025-04-03 PROCEDURE — 250N000011 HC RX IP 250 OP 636

## 2025-04-03 PROCEDURE — 83735 ASSAY OF MAGNESIUM: CPT | Performed by: COLON & RECTAL SURGERY

## 2025-04-03 PROCEDURE — 85049 AUTOMATED PLATELET COUNT: CPT | Performed by: COLON & RECTAL SURGERY

## 2025-04-03 PROCEDURE — 250N000013 HC RX MED GY IP 250 OP 250 PS 637: Performed by: COLON & RECTAL SURGERY

## 2025-04-03 PROCEDURE — 84132 ASSAY OF SERUM POTASSIUM: CPT | Performed by: COLON & RECTAL SURGERY

## 2025-04-03 PROCEDURE — 82565 ASSAY OF CREATININE: CPT | Performed by: COLON & RECTAL SURGERY

## 2025-04-03 RX ORDER — POTASSIUM CHLORIDE 1500 MG/1
20 TABLET, EXTENDED RELEASE ORAL ONCE
Status: COMPLETED | OUTPATIENT
Start: 2025-04-03 | End: 2025-04-03

## 2025-04-03 RX ADMIN — ACETAMINOPHEN 1000 MG: 500 TABLET ORAL at 10:33

## 2025-04-03 RX ADMIN — POTASSIUM CHLORIDE 20 MEQ: 1500 TABLET, EXTENDED RELEASE ORAL at 08:35

## 2025-04-03 RX ADMIN — ENOXAPARIN SODIUM 30 MG: 30 INJECTION SUBCUTANEOUS at 10:33

## 2025-04-03 ASSESSMENT — ACTIVITIES OF DAILY LIVING (ADL)
ADLS_ACUITY_SCORE: 42

## 2025-04-03 NOTE — PROGRESS NOTES
Colon and Rectal Surgery  Daily Progress Note    Subjective  Patient reports feeling well without pain, just a little soreness around stoma site.  Not needing narcotics.  Feels comfortable emptying stoma appliance. Tolerating LFD without nausea or vomiting    Objective  Intake/Output last 24 hrs:    Intake/Output Summary (Last 24 hours) at 4/2/2025 0843  Last data filed at 4/2/2025 0517  Gross per 24 hour   Intake 720 ml   Output 2875 ml   Net -2155 ml     Temp:  [98  F (36.7  C)-98.5  F (36.9  C)] 98.4  F (36.9  C)  Pulse:  [86-93] 86  Resp:  [18-20] 18  BP: (116-138)/(56-63) 116/56  SpO2:  [96 %-97 %] 96 %    Physical Exam:  General: awake, alert, lying in bed, in no acute distress  Head: normocephalic, atraumatic  Respiratory: non-labored breathing  Abdomen: soft, non tender, non-distended              Incisions: clean, dry and intact   Stoma: pink and viable, loose green output in bag, 220 ml recorded  : reagan catheter in place draining clear yellow urine  Skin: No rashes or lesions  Musculoskeletal: moves all four extremities equally  Psychological: alert and oriented, answers questions appropriately    Pertinent Labs  Lab Results: personally reviewed.  Lab Results   Component Value Date     04/01/2025    CO2 24 04/01/2025    BUN 6.1 04/01/2025     Lab Results   Component Value Date    WBC 11.8 04/01/2025    HGB 10.5 04/01/2025    HGB 12.4 03/31/2025    HCT 31.6 04/01/2025    MCV 89 04/01/2025     04/01/2025       Assessment/Plan: This is a 69 year old female POD #3 s/p robotic LAR and DLI for rectal cancer.  Doing well, appropriate for discharge home today if passes void trial    - Continue low fiber diet  - Remove reagan today  - Lovenox DVT ppx, will go with extended course so needs lovenox teaching  - WOCN stoma teaching  - Pathology pending  - SW/CM consult placed for [facility] for new stoma    Discussed with Dr. Richard Morris MD  [facility] Fellow  [facility]  [phone]..............................main

## 2025-04-03 NOTE — PLAN OF CARE
Goal Outcome Evaluation:         Problem: Pain Acute  Goal: Optimal Pain Control and Function  Outcome: Progressing  Intervention: Develop Pain Management Plan      Problem: Adult Inpatient Plan of Care  Goal: Readiness for Transition of Care  Outcome: Progressing        Patient alert and oriented x4, minimal pain, tolerating low fiber diet. Removed reagan-patient tolerated this well and now voiding urine without issues. Patient ready to discharge, contacted colorectal and they will place orders. Did lovenox teaching with  and patient. Pt states she is very comfortable with her ostomy care and does not need anymore teaching.     Darleen Cuellar, RN 4/3/2025 11:43 AM

## 2025-04-03 NOTE — PLAN OF CARE
Problem: Adult Inpatient Plan of Care  Goal: Optimal Comfort and Wellbeing  Outcome: Progressing     Problem: Pain Acute  Goal: Optimal Pain Control and Function  Outcome: Progressing  Intervention: Prevent or Manage Pain  Recent Flowsheet Documentation  Taken 4/2/2025 1830 by Kendra Morgan RN  Medication Review/Management: medications reviewed     Problem: Nausea and Vomiting  Goal: Nausea and Vomiting Relief  Outcome: Progressing     Problem: Fall Injury Risk  Goal: Absence of Fall and Fall-Related Injury  Outcome: Progressing  Intervention: Identify and Manage Contributors  Recent Flowsheet Documentation  Taken 4/2/2025 1830 by Kendra Morgan RN  Medication Review/Management: medications reviewed  Intervention: Promote Injury-Free Environment  Recent Flowsheet Documentation  Taken 4/2/2025 1830 by Kendra Morgan RN  Safety Promotion/Fall Prevention:   assistive device/personal items within reach   clutter free environment maintained   lighting adjusted   nonskid shoes/slippers when out of bed   room near nurse's station   patient and family education   safety round/check completed   room organization consistent   Pt denies pain/nausea. Emptying ostomy independently and charting output on communication board. , no coverage given per sliding scale. Mendoza patent. VSS on RA, continue to monitor.    Kendra Morgan, RN

## 2025-04-03 NOTE — PLAN OF CARE
Problem: Adult Inpatient Plan of Care  Goal: Plan of Care Review  Description: The Plan of Care Review/Shift note should be completed every shift.  The Outcome Evaluation is a brief statement about your assessment that the patient is improving, declining, or no change.  This information will be displayed automatically on your shiftnote.  Outcome: Progressing     Problem: Adult Inpatient Plan of Care  Goal: Absence of Hospital-Acquired Illness or Injury  Intervention: Identify and Manage Fall Risk  Recent Flowsheet Documentation  Taken 4/3/2025 0000 by [place] Marcus RN  Safety Promotion/Fall Prevention: lighting adjusted   Goal Outcome Evaluation:         Problem: Pain Acute  Goal: Optimal Pain Control and Function  Outcome: Progressing  Intervention: Prevent or Manage Pain  Recent Flowsheet Documentation  Taken 4/3/2025 0000 by [place] Marcus RN  Medication Review/Management: medications reviewed     A & O x4, RA. Denies pain. Pt has ostomy, and reagan catheter. PIV, SL. Slept well the night.

## 2025-04-03 NOTE — PLAN OF CARE
Discharge information went over with patient and spouse at this time. All questions answered. Belongings packed up. Patient escorted off the unit ambulatory with staff.

## 2025-04-03 NOTE — DISCHARGE SUMMARY
Discharge Summary    Patient name: Kailee Lam  YOB: 1955   Age: 69 year old  Medical Record Number: [id number]  Primary Care Physician:  Bernarda Gutierrez       Admission Date: 3/31/2025.  Discharge Date: 4/3/2025.  Condition at Discharge: Stable       Principal Diagnosis:  Rectal cancer    HISTORY OF PRESENT ILLNESS: The patient is a 69 year old female with a history of an early mid rectal cancer. Staging workup with a ct chest/abd/pelvis did not show any obvious distant metastatic disease and on MRI she was staged as a T1/T2N0 with neg CRM and EMVI.  We discussed surgical resection.      PROCEDURES PERFORMED DURING HOSPITALIZATION:   1. Robotic low anterior resection  2. Diverting loop ileostomy  3. Rigid proctoscopy    FINAL PATHOLOGY: PENDING    BRIEF HOSPITAL COURSE: This 69 year old female presented for an elective robotic low anterior resection and diverting loop ileostomy and was transferred to the surgical [facility] following the procedure.  Diet was advanced with return of bowel function.  Pain medication was transitioned from IV to oral uneventfully. Mendoza remained in place for 3 days. At the time of discharge, the patient was voiding freely, tolerating a regular diet, and pain was controlled with oral pain medications.  The patient was discharged home on POD #3 in stable condition with a course of extended Lovenox for VTE prophylaxis. She did not wish to have [facility].      PHYSICAL EXAM ON DATE OF DISCHARGE: see daily progress note dated 4/3/25    Vital Signs in last 24 hours:   Temp:  [98.1  F (36.7  C)-98.5  F (36.9  C)] 98.1  F (36.7  C)  Pulse:  [86-99] 99  Resp:  [18-20] 20  BP: (116-135)/(56-62) 128/60  SpO2:  [96 %-97 %] 97 %    COMPLICATIONS IN HOSPITAL: none    IMPORTANT PENDING TEST RESULTS: Pathology    Discharge Medications/Orders:     Review of your medicines        START taking        Dose / Directions   enoxaparin ANTICOAGULANT 30 MG/0.3ML syringe  Commonly known as: LOVENOX       Dose: 30 mg  Inject 0.3 mLs (30 mg) subcutaneously every 24 hours for 25 days.  Quantity: 7.5 mL  Refills: 0            CONTINUE these medicines which have NOT CHANGED        Dose / Directions   alendronate 70 MG tablet  Commonly known as: FOSAMAX      Take 1 Tablet (70 mg) by mouth once every week. Take 30 minutes before first food-drink-medication. Avoid lying down for 30 minutes.*  Refills: 0     atorvastatin 40 MG tablet  Commonly known as: LIPITOR      Take 1 Tablet (40 mg) by mouth daily.*  Refills: 0     Calcium Carbonate-Vitamin D 250-3.125 MG-MCG Tabs      Take 1 Tablet (250 mg elemental Ca) by mouth daily.  Refills: 0     lisinopril 5 MG tablet  Commonly known as: ZESTRIL      Take 1 Tablet (5 mg) by mouth daily.*  Refills: 0     metFORMIN 500 MG 24 hr tablet  Commonly known as: GLUCOPHAGE XR      Dose: 500 mg  Take 500 mg by mouth daily (with dinner).  Refills: 0     MULTIVITAMIN/IRON PO      Dose: 1 tablet  Take 1 tablet by mouth daily.  Refills: 0            STOP taking      polyethylene glycol 17 g packet  Commonly known as: MIRALAX                  Where to get your medicines        These medications were sent to [id number] [place] [address]      Phone: [phone]   enoxaparin ANTICOAGULANT 30 MG/0.3ML syringe           FOLLOW UP: She should see Bernarda Gutierrez in one week.  Follow up with Dr. Ramos in 3-4 weeks.     Total time spent for discharge on date of discharge: 20 minutes, with over half spent in counseling and coordinating care    I did not see the patient on the date of discharge.    Marlen Wallace PA-C  [facility]  [phone]    ADDENDUM:  Length of stay: 3 days  Indicate Y or N for the following:  UTI NO  C diff NO  PNA NO  SSI NO  DVT NO  PE NO  CVA NO  MI NO  Enterocutaneous fistula NO  Peripheral nerve injury NO  Abscess (not adjacent to anastomosis) NO  Leak NO                         Treated with:              Antibiotics NO              Drain NO              Reoperation NO  Death within 30 days NO  Reintubation NO  Reoperation NO              Procedure     FOR CANCER CASES: PENDING  T stage (1,2,3,4,5 if unknown):  N stage (0,1,2,3 if unknown):               Total number of nodes:               Total positive:   M stage (0,1):   R (0,1,2,3 if unknown):  TME grade, if known (1,2,3):   MSI (pos, neg):

## 2025-04-03 NOTE — PLAN OF CARE
Goal Outcome Evaluation:                  PRIMARY DIAGNOSIS: ACUTE PAIN  OUTPATIENT/OBSERVATION GOALS TO BE MET BEFORE DISCHARGE:  1. Pain Status: Pain free.    2. Return to near baseline physical activity: Yes    3. Cleared for discharge by consultants (if involved): Unknown    Discharge Planner Nurse   Safe discharge environment identified: Yes  Barriers to discharge: Yes Kelly.       Entered by: Marisa Scott RN 04/02/2025 10:46 PM     Please review provider order for any additional goals.   Nurse to notify provider when observation goals have been met and patient is ready for discharge.

## 2025-04-04 LAB
PATH REPORT.COMMENTS IMP SPEC: NORMAL
PATH REPORT.FINAL DX SPEC: NORMAL
PATH REPORT.GROSS SPEC: NORMAL
PATH REPORT.MICROSCOPIC SPEC OTHER STN: NORMAL
PATH REPORT.RELEVANT HX SPEC: NORMAL
PATHOLOGY SYNOPTIC REPORT: NORMAL
PHOTO IMAGE: NORMAL

## 2025-06-10 ENCOUNTER — HOSPITAL ENCOUNTER (INPATIENT)
Facility: HOSPITAL | Age: 70
End: 2025-06-10
Attending: EMERGENCY MEDICINE | Admitting: INTERNAL MEDICINE
Payer: COMMERCIAL

## 2025-06-10 ENCOUNTER — APPOINTMENT (OUTPATIENT)
Dept: RADIOLOGY | Facility: HOSPITAL | Age: 70
DRG: 393 | End: 2025-06-10
Attending: EMERGENCY MEDICINE
Payer: COMMERCIAL

## 2025-06-10 ENCOUNTER — APPOINTMENT (OUTPATIENT)
Dept: CT IMAGING | Facility: HOSPITAL | Age: 70
DRG: 393 | End: 2025-06-10
Attending: INTERNAL MEDICINE
Payer: COMMERCIAL

## 2025-06-10 DIAGNOSIS — Z93.2 HIGH OUTPUT ILEOSTOMY (H): ICD-10-CM

## 2025-06-10 DIAGNOSIS — R19.8 HIGH OUTPUT ILEOSTOMY (H): ICD-10-CM

## 2025-06-10 DIAGNOSIS — R11.2 NAUSEA AND VOMITING, UNSPECIFIED VOMITING TYPE: ICD-10-CM

## 2025-06-10 DIAGNOSIS — R11.0 NAUSEA: ICD-10-CM

## 2025-06-10 DIAGNOSIS — Z93.2 INCREASED ILEOSTOMY OUTPUT (H): ICD-10-CM

## 2025-06-10 DIAGNOSIS — E87.6 HYPOKALEMIA: ICD-10-CM

## 2025-06-10 DIAGNOSIS — N17.9 ACUTE KIDNEY INJURY: ICD-10-CM

## 2025-06-10 DIAGNOSIS — R19.8 INCREASED ILEOSTOMY OUTPUT (H): ICD-10-CM

## 2025-06-10 DIAGNOSIS — Z43.2 ENCOUNTER FOR ATTENTION TO ILEOSTOMY (H): ICD-10-CM

## 2025-06-10 DIAGNOSIS — C20 RECTAL CANCER (H): Primary | ICD-10-CM

## 2025-06-10 LAB
ACANTHOCYTES BLD QL SMEAR: SLIGHT
ALBUMIN SERPL BCG-MCNC: 3.8 G/DL (ref 3.5–5.2)
ALP SERPL-CCNC: 136 U/L (ref 40–150)
ALT SERPL W P-5'-P-CCNC: 10 U/L (ref 0–50)
ANION GAP SERPL CALCULATED.3IONS-SCNC: 13 MMOL/L (ref 7–15)
ANION GAP SERPL CALCULATED.3IONS-SCNC: 21 MMOL/L (ref 7–15)
AST SERPL W P-5'-P-CCNC: 24 U/L (ref 0–45)
ATRIAL RATE - MUSE: 122 BPM
B-OH-BUTYR SERPL-SCNC: 2.14 MMOL/L
BASOPHILS # BLD AUTO: 0 10E3/UL (ref 0–0.2)
BASOPHILS NFR BLD AUTO: 0 %
BILIRUB SERPL-MCNC: 0.5 MG/DL
BUN SERPL-MCNC: 33.4 MG/DL (ref 8–23)
BUN SERPL-MCNC: 42.6 MG/DL (ref 8–23)
CALCIUM SERPL-MCNC: 7.5 MG/DL (ref 8.8–10.4)
CALCIUM SERPL-MCNC: 9.6 MG/DL (ref 8.8–10.4)
CHLORIDE SERPL-SCNC: 86 MMOL/L (ref 98–107)
CHLORIDE SERPL-SCNC: 99 MMOL/L (ref 98–107)
CREAT SERPL-MCNC: 1 MG/DL (ref 0.51–0.95)
CREAT SERPL-MCNC: 1.39 MG/DL (ref 0.51–0.95)
DIASTOLIC BLOOD PRESSURE - MUSE: 66 MMHG
EGFRCR SERPLBLD CKD-EPI 2021: 41 ML/MIN/1.73M2
EGFRCR SERPLBLD CKD-EPI 2021: 61 ML/MIN/1.73M2
EOSINOPHIL # BLD AUTO: 0 10E3/UL (ref 0–0.7)
EOSINOPHIL NFR BLD AUTO: 0 %
ERYTHROCYTE [DISTWIDTH] IN BLOOD BY AUTOMATED COUNT: 16 % (ref 10–15)
GLUCOSE BLDC GLUCOMTR-MCNC: 111 MG/DL (ref 70–99)
GLUCOSE SERPL-MCNC: 131 MG/DL (ref 70–99)
GLUCOSE SERPL-MCNC: 215 MG/DL (ref 70–99)
HCO3 SERPL-SCNC: 19 MMOL/L (ref 22–29)
HCO3 SERPL-SCNC: 20 MMOL/L (ref 22–29)
HCT VFR BLD AUTO: 41.1 % (ref 35–47)
HGB BLD-MCNC: 14.5 G/DL (ref 11.7–15.7)
HOLD SPECIMEN: NORMAL
HOLD SPECIMEN: NORMAL
IMM GRANULOCYTES # BLD: 0.2 10E3/UL
IMM GRANULOCYTES NFR BLD: 2 %
INTERPRETATION ECG - MUSE: NORMAL
LACTATE SERPL-SCNC: 1.7 MMOL/L (ref 0.7–2)
LACTATE SERPL-SCNC: 2.6 MMOL/L (ref 0.7–2)
LYMPHOCYTES # BLD AUTO: 1 10E3/UL (ref 0.8–5.3)
LYMPHOCYTES NFR BLD AUTO: 10 %
MAGNESIUM SERPL-MCNC: 2.6 MG/DL (ref 1.7–2.3)
MCH RBC QN AUTO: 29.5 PG (ref 26.5–33)
MCHC RBC AUTO-ENTMCNC: 35.3 G/DL (ref 31.5–36.5)
MCV RBC AUTO: 84 FL (ref 78–100)
MONOCYTES # BLD AUTO: 1.8 10E3/UL (ref 0–1.3)
MONOCYTES NFR BLD AUTO: 17 %
NEUTROPHILS # BLD AUTO: 7.3 10E3/UL (ref 1.6–8.3)
NEUTROPHILS NFR BLD AUTO: 71 %
NRBC # BLD AUTO: 0 10E3/UL
NRBC BLD AUTO-RTO: 0 /100
P AXIS - MUSE: 77 DEGREES
PHOSPHATE SERPL-MCNC: 4.1 MG/DL (ref 2.5–4.5)
PLAT MORPH BLD: ABNORMAL
PLATELET # BLD AUTO: 789 10E3/UL (ref 150–450)
POTASSIUM SERPL-SCNC: 3.3 MMOL/L (ref 3.4–5.3)
POTASSIUM SERPL-SCNC: 4.2 MMOL/L (ref 3.4–5.3)
PR INTERVAL - MUSE: 130 MS
PROT SERPL-MCNC: 7.3 G/DL (ref 6.4–8.3)
QRS DURATION - MUSE: 90 MS
QT - MUSE: 330 MS
QTC - MUSE: 470 MS
R AXIS - MUSE: 78 DEGREES
RBC # BLD AUTO: 4.91 10E6/UL (ref 3.8–5.2)
RBC MORPH BLD: ABNORMAL
SODIUM SERPL-SCNC: 127 MMOL/L (ref 135–145)
SODIUM SERPL-SCNC: 131 MMOL/L (ref 135–145)
SYSTOLIC BLOOD PRESSURE - MUSE: 139 MMHG
T AXIS - MUSE: 66 DEGREES
VENTRICULAR RATE- MUSE: 122 BPM
WBC # BLD AUTO: 10.3 10E3/UL (ref 4–11)

## 2025-06-10 PROCEDURE — 96375 TX/PRO/DX INJ NEW DRUG ADDON: CPT

## 2025-06-10 PROCEDURE — 85025 COMPLETE CBC W/AUTO DIFF WBC: CPT | Performed by: EMERGENCY MEDICINE

## 2025-06-10 PROCEDURE — 82962 GLUCOSE BLOOD TEST: CPT

## 2025-06-10 PROCEDURE — 250N000011 HC RX IP 250 OP 636: Performed by: EMERGENCY MEDICINE

## 2025-06-10 PROCEDURE — 258N000003 HC RX IP 258 OP 636: Performed by: INTERNAL MEDICINE

## 2025-06-10 PROCEDURE — 258N000003 HC RX IP 258 OP 636: Performed by: EMERGENCY MEDICINE

## 2025-06-10 PROCEDURE — 99207 PR APP CREDIT; MD BILLING SHARED VISIT: CPT | Mod: FS

## 2025-06-10 PROCEDURE — 84100 ASSAY OF PHOSPHORUS: CPT | Performed by: EMERGENCY MEDICINE

## 2025-06-10 PROCEDURE — 96361 HYDRATE IV INFUSION ADD-ON: CPT

## 2025-06-10 PROCEDURE — 99223 1ST HOSP IP/OBS HIGH 75: CPT | Mod: FS | Performed by: INTERNAL MEDICINE

## 2025-06-10 PROCEDURE — 83735 ASSAY OF MAGNESIUM: CPT | Performed by: EMERGENCY MEDICINE

## 2025-06-10 PROCEDURE — 36415 COLL VENOUS BLD VENIPUNCTURE: CPT | Performed by: INTERNAL MEDICINE

## 2025-06-10 PROCEDURE — 83605 ASSAY OF LACTIC ACID: CPT | Performed by: EMERGENCY MEDICINE

## 2025-06-10 PROCEDURE — G0378 HOSPITAL OBSERVATION PER HR: HCPCS

## 2025-06-10 PROCEDURE — 74176 CT ABD & PELVIS W/O CONTRAST: CPT

## 2025-06-10 PROCEDURE — 71045 X-RAY EXAM CHEST 1 VIEW: CPT

## 2025-06-10 PROCEDURE — 36415 COLL VENOUS BLD VENIPUNCTURE: CPT | Performed by: EMERGENCY MEDICINE

## 2025-06-10 PROCEDURE — 82947 ASSAY GLUCOSE BLOOD QUANT: CPT | Performed by: INTERNAL MEDICINE

## 2025-06-10 PROCEDURE — 82010 KETONE BODYS QUAN: CPT

## 2025-06-10 PROCEDURE — 82040 ASSAY OF SERUM ALBUMIN: CPT | Performed by: EMERGENCY MEDICINE

## 2025-06-10 PROCEDURE — 96374 THER/PROPH/DIAG INJ IV PUSH: CPT

## 2025-06-10 PROCEDURE — 99285 EMERGENCY DEPT VISIT HI MDM: CPT | Mod: 25

## 2025-06-10 PROCEDURE — 258N000003 HC RX IP 258 OP 636

## 2025-06-10 PROCEDURE — 93005 ELECTROCARDIOGRAM TRACING: CPT | Performed by: EMERGENCY MEDICINE

## 2025-06-10 PROCEDURE — 250N000011 HC RX IP 250 OP 636

## 2025-06-10 RX ORDER — SODIUM CHLORIDE, SODIUM LACTATE, POTASSIUM CHLORIDE, CALCIUM CHLORIDE 600; 310; 30; 20 MG/100ML; MG/100ML; MG/100ML; MG/100ML
INJECTION, SOLUTION INTRAVENOUS CONTINUOUS
Status: DISCONTINUED | OUTPATIENT
Start: 2025-06-10 | End: 2025-06-10

## 2025-06-10 RX ORDER — PROCHLORPERAZINE MALEATE 5 MG/1
5-10 TABLET ORAL EVERY 6 HOURS PRN
Status: ON HOLD | COMMUNITY
End: 2025-06-14

## 2025-06-10 RX ORDER — DIPHENHYDRAMINE HYDROCHLORIDE 50 MG/ML
25 INJECTION, SOLUTION INTRAMUSCULAR; INTRAVENOUS ONCE
Status: COMPLETED | OUTPATIENT
Start: 2025-06-10 | End: 2025-06-10

## 2025-06-10 RX ORDER — CAPECITABINE 500 MG/1
500 TABLET, FILM COATED ORAL 2 TIMES DAILY
COMMUNITY

## 2025-06-10 RX ORDER — DIPHENOXYLATE HYDROCHLORIDE AND ATROPINE SULFATE 2.5; .025 MG/1; MG/1
1 TABLET ORAL 4 TIMES DAILY PRN
Status: DISCONTINUED | OUTPATIENT
Start: 2025-06-10 | End: 2025-06-12

## 2025-06-10 RX ORDER — ACETAMINOPHEN 650 MG/1
650 SUPPOSITORY RECTAL EVERY 4 HOURS PRN
Status: DISCONTINUED | OUTPATIENT
Start: 2025-06-10 | End: 2025-06-19 | Stop reason: HOSPADM

## 2025-06-10 RX ORDER — LISINOPRIL 5 MG/1
5 TABLET ORAL DAILY
Status: DISCONTINUED | OUTPATIENT
Start: 2025-06-11 | End: 2025-06-19 | Stop reason: HOSPADM

## 2025-06-10 RX ORDER — AMOXICILLIN 250 MG
1 CAPSULE ORAL 2 TIMES DAILY PRN
Status: DISCONTINUED | OUTPATIENT
Start: 2025-06-10 | End: 2025-06-19 | Stop reason: HOSPADM

## 2025-06-10 RX ORDER — PROCHLORPERAZINE MALEATE 5 MG/1
5 TABLET ORAL EVERY 6 HOURS PRN
Status: DISCONTINUED | OUTPATIENT
Start: 2025-06-10 | End: 2025-06-19 | Stop reason: HOSPADM

## 2025-06-10 RX ORDER — ACETAMINOPHEN 325 MG/1
650 TABLET ORAL EVERY 4 HOURS PRN
Status: DISCONTINUED | OUTPATIENT
Start: 2025-06-10 | End: 2025-06-19 | Stop reason: HOSPADM

## 2025-06-10 RX ORDER — ONDANSETRON 2 MG/ML
4 INJECTION INTRAMUSCULAR; INTRAVENOUS EVERY 6 HOURS PRN
Status: DISCONTINUED | OUTPATIENT
Start: 2025-06-10 | End: 2025-06-10

## 2025-06-10 RX ORDER — HEPARIN SODIUM 5000 [USP'U]/.5ML
5000 INJECTION, SOLUTION INTRAVENOUS; SUBCUTANEOUS EVERY 8 HOURS
Status: DISCONTINUED | OUTPATIENT
Start: 2025-06-10 | End: 2025-06-19 | Stop reason: HOSPADM

## 2025-06-10 RX ORDER — ONDANSETRON 2 MG/ML
4 INJECTION INTRAMUSCULAR; INTRAVENOUS ONCE
Status: COMPLETED | OUTPATIENT
Start: 2025-06-10 | End: 2025-06-10

## 2025-06-10 RX ORDER — ATORVASTATIN CALCIUM 40 MG/1
40 TABLET, FILM COATED ORAL DAILY
Status: DISCONTINUED | OUTPATIENT
Start: 2025-06-11 | End: 2025-06-19 | Stop reason: HOSPADM

## 2025-06-10 RX ORDER — DEXTROSE MONOHYDRATE 25 G/50ML
25-50 INJECTION, SOLUTION INTRAVENOUS
Status: DISCONTINUED | OUTPATIENT
Start: 2025-06-10 | End: 2025-06-19 | Stop reason: HOSPADM

## 2025-06-10 RX ORDER — CAPECITABINE 500 MG/1
500 TABLET, FILM COATED ORAL 2 TIMES DAILY
Status: DISCONTINUED | OUTPATIENT
Start: 2025-06-10 | End: 2025-06-19 | Stop reason: HOSPADM

## 2025-06-10 RX ORDER — DIPHENOXYLATE HYDROCHLORIDE AND ATROPINE SULFATE 2.5; .025 MG/1; MG/1
1 TABLET ORAL 4 TIMES DAILY PRN
Status: ON HOLD | COMMUNITY
End: 2025-06-14

## 2025-06-10 RX ORDER — NICOTINE POLACRILEX 4 MG
15-30 LOZENGE BUCCAL
Status: DISCONTINUED | OUTPATIENT
Start: 2025-06-10 | End: 2025-06-19 | Stop reason: HOSPADM

## 2025-06-10 RX ORDER — AMOXICILLIN 250 MG
2 CAPSULE ORAL 2 TIMES DAILY PRN
Status: DISCONTINUED | OUTPATIENT
Start: 2025-06-10 | End: 2025-06-19 | Stop reason: HOSPADM

## 2025-06-10 RX ORDER — ONDANSETRON 4 MG/1
4 TABLET, ORALLY DISINTEGRATING ORAL EVERY 6 HOURS PRN
Status: DISCONTINUED | OUTPATIENT
Start: 2025-06-10 | End: 2025-06-10

## 2025-06-10 RX ORDER — DEXTROSE MONOHYDRATE AND SODIUM CHLORIDE 5; .9 G/100ML; G/100ML
INJECTION, SOLUTION INTRAVENOUS CONTINUOUS
Status: DISCONTINUED | OUTPATIENT
Start: 2025-06-10 | End: 2025-06-11

## 2025-06-10 RX ORDER — ONDANSETRON 4 MG/1
4 TABLET, FILM COATED ORAL EVERY 6 HOURS
Status: DISCONTINUED | OUTPATIENT
Start: 2025-06-10 | End: 2025-06-11

## 2025-06-10 RX ADMIN — DIPHENHYDRAMINE HYDROCHLORIDE 25 MG: 50 INJECTION, SOLUTION INTRAMUSCULAR; INTRAVENOUS at 18:10

## 2025-06-10 RX ADMIN — ONDANSETRON 4 MG: 2 INJECTION, SOLUTION INTRAMUSCULAR; INTRAVENOUS at 18:10

## 2025-06-10 RX ADMIN — SODIUM CHLORIDE 2000 ML: 9 INJECTION, SOLUTION INTRAVENOUS at 18:09

## 2025-06-10 RX ADMIN — ONDANSETRON HYDROCHLORIDE 4 MG: 4 TABLET, FILM COATED ORAL at 22:39

## 2025-06-10 RX ADMIN — DEXTROSE AND SODIUM CHLORIDE: 5; 900 INJECTION, SOLUTION INTRAVENOUS at 23:55

## 2025-06-10 RX ADMIN — FAMOTIDINE 20 MG: 10 INJECTION, SOLUTION INTRAVENOUS at 18:10

## 2025-06-10 RX ADMIN — SODIUM CHLORIDE, SODIUM LACTATE, POTASSIUM CHLORIDE, AND CALCIUM CHLORIDE: .6; .31; .03; .02 INJECTION, SOLUTION INTRAVENOUS at 22:35

## 2025-06-10 ASSESSMENT — COLUMBIA-SUICIDE SEVERITY RATING SCALE - C-SSRS
1. IN THE PAST MONTH, HAVE YOU WISHED YOU WERE DEAD OR WISHED YOU COULD GO TO SLEEP AND NOT WAKE UP?: NO
2. HAVE YOU ACTUALLY HAD ANY THOUGHTS OF KILLING YOURSELF IN THE PAST MONTH?: NO
6. HAVE YOU EVER DONE ANYTHING, STARTED TO DO ANYTHING, OR PREPARED TO DO ANYTHING TO END YOUR LIFE?: NO

## 2025-06-10 ASSESSMENT — ACTIVITIES OF DAILY LIVING (ADL)
ADLS_ACUITY_SCORE: 59

## 2025-06-10 NOTE — ED PROVIDER NOTES
EMERGENCY DEPARTMENT ENCOUNTER      NAME: Kailee Lam  AGE: 70 year old female  YOB: 1955  MRN: [id number]  EVALUATION DATE & TIME: No admission date for patient encounter.    PCP: Bernarda Gutierrez    ED PROVIDER: David Leyva M.D.      Chief Complaint   Patient presents with    Emesis    Nausea         FINAL IMPRESSION:  Hyponatremia  Acute kidney injury  Recurrent nausea and vomiting  High output ileostomy      ED COURSE & MEDICAL DECISION MAKING:    Pertinent Labs & Imaging studies reviewed. (See chart for details)  70 year old female presents to the Emergency Department for evaluation of nausea and vomiting, general malaise, increased ileostomy output.  Patient arrives with  and son who is a physician.  Patient underwent ileostomy in March for rectal cancer.  Patient had 2 positive lymph no evidence and underwent chemotherapy.  Last infusion more than a week ago.  Patient with recurrent episodes of nausea and recurrent vomiting along with increased ileostomy output.  Patient seen for the same on Friday.  Laboratory evaluation remarkable for slight hyponatremia.  Felt much improved after 2 L infusion.  Patient with near identical presentation today.  Did quizzed patient regarding use of Zofran and Lomotil.  These have been quite limited and may be contributory to her symptomatology.  Patient is a frail very thin female in mild distress.  Vital signs significant for borderline hypotension and moderate tachycardia.  Exam otherwise unremarkable.  Will proceed with intravenous fluid bolus along with reevaluation for continued electrolyte imbalance, hypomagnesemia, hypophosphatemia.  Long conversation held with patient and her son and  regarding the need to take medications routinely and preemptively to avoid ongoing symptomatology.  Will discuss situation with care manager with plan of potentially setting up routine visits at infusion center.. Patient appears non toxic     5:26 PM I  met with the patient for the initial interview and physical examination. Discussed plan for treatment and workup in the ED.    6:10 PM Spoke with  regarding the patient's outpatient vs inpatient options.  Will update the patient and her family at this time.  6:33 PM.  Laboratory evaluation remarkable for mild hyponatremia sodium 127.  Mild acute kidney injury with BUN of 42 and creatinine 1.39.  Creatinine from a few months ago was entirely normal at 0.46.  Phosphorus normal at 4.1.  Magnesium slightly elevated 2.6.  Lactic acid mildly elevated at 2.6.  Will discuss results with patient and family.  Plan will be for hospitalization overnight to continue with hydration.  This will also provide opportunity to establish outpatient infusion process  7:08 PM Discussed the case with Dr. Baker, Hospitalist, who agrees to accept the patient at this time.     At the conclusion of the encounter I discussed the results of all of the tests and the disposition. The questions were answered and return precautions provided. The patient or family acknowledged understanding and was agreeable with the care plan.          MEDICATIONS GIVEN IN THE EMERGENCY:  Medications   sodium chloride 0.9% BOLUS 2,000 mL (0 mLs Intravenous Stopped 6/10/25 1955)   ondansetron (ZOFRAN) injection 4 mg (4 mg Intravenous $Given 6/10/25 1810)   famotidine (PEPCID) injection 20 mg (20 mg Intravenous $Given 6/10/25 1810)   diphenhydrAMINE (BENADRYL) injection 25 mg (25 mg Intravenous $Given 6/10/25 1810)       NEW PRESCRIPTIONS STARTED AT TODAY'S ER VISIT  New Prescriptions    No medications on file          =================================================================    HPI    Patient information was obtained from: the patient and her son and     Use of Intrepreter: N/A       Kailee Lam is a 70 year old female with a pertient medical history of diverticular disease, impaired glucose tolerance, HTN, rectal cancer, migraine,  and HLD, who presents to the ED for evaluation of nausea and vomiting.     Per chart review, the patient was seen on 6/7/2025 at [facility] for treatment of dehydration, hyponatremia, and JOSEPH. During this visit, the patient was given zofran for nausea. The patient was discharged with instruction to increase her fluid intake, drink an electrolyte containing beverage, and follow up in clinic or with oncologist in the next 2-3 days for renal re-check.     The patient presents with persistent nausea, vomiting, and diarrhea. She has been unable to tolerate food since 6/8 (2 days ago) secondary to her symptoms.  She states that she either vomits or the food goes immediately out of her ostomy. She has been taking Zofran and Compazine for nausea without sufficient relief at home. S/p complete colectomy 3/31/2025 for rectal cancer. She is currently on oral and IV chemotherapy, last dose 6/5 (5 days ago). She is scheduled for her next dose in two weeks, as she will be going on vacation soon. Her  states that the cancer was found at stage one prior to tumor and lymph node excision (2 lymph nodes). No cardiac history. No port in place. The patient and her  expressed interest in at-home infusions or pursuing outpatient infusions at an infusion center. She denies any abdominal pain. No other concerns at this time.       REVIEW OF SYSTEMS   Constitutional:  Denies fever, chills  Respiratory:  Denies productive cough or increased work of breathing  Cardiovascular:  Denies chest pain, palpitations  GI:  Denies abdominal pain Positive for nausea, vomiting, high output from ostomy.  Musculoskeletal:  Denies any new muscle/joint swelling  Skin:  Denies rash   Neurologic:  Denies focal weakness  All systems negative except as marked.     PAST MEDICAL HISTORY:  Past Medical History:   Diagnosis Date    Dysplastic nevus     Gross hematuria     Hypertension     Intractable migraine without aura and with  status migrainosus     Mixed hyperlipidemia     Osteoporosis     Rectal adenocarcinoma (H)     Seasonal allergic rhinitis        PAST SURGICAL HISTORY:  Past Surgical History:   Procedure Laterality Date    COLECTOMY, LOW ANTERIOR, ROBOT-ASSISTED, LAPAROSCOPIC, USING DA SHAUN XI N/A 3/31/2025    Procedure: ROBOTIC LOW ANTERIOR RESECTION, WITH DIVERTERTING LOOP ILEOSTOMY;  Surgeon: Ekaterina Ramos MD;  Location: [facility] Main OR         CURRENT MEDICATIONS:    No current facility-administered medications for this encounter.    Current Outpatient Medications:     alendronate (FOSAMAX) 70 MG tablet, Take 1 Tablet (70 mg) by mouth once every week. Take 30 minutes before first food-drink-medication. Avoid lying down for 30 minutes.*, Disp: , Rfl:     atorvastatin (LIPITOR) 40 MG tablet, Take 1 Tablet (40 mg) by mouth daily.*, Disp: , Rfl:     Calcium Carbonate-Vitamin D 250-3.125 MG-MCG TABS, Take 1 Tablet (250 mg elemental Ca) by mouth daily., Disp: , Rfl:     capecitabine (XELODA) 500 MG tablet, Take 500 mg by mouth 2 times daily., Disp: , Rfl:     diphenoxylate-atropine (LOMOTIL) 2.5-0.025 MG tablet, Take 1 tablet by mouth 4 times daily as needed for diarrhea., Disp: , Rfl:     lisinopril (ZESTRIL) 5 MG tablet, Take 1 Tablet (5 mg) by mouth daily.*, Disp: , Rfl:     metFORMIN (GLUCOPHAGE XR) 500 MG 24 hr tablet, Take 500 mg by mouth daily (with dinner)., Disp: , Rfl:     Multiple Vitamins-Iron (MULTIVITAMIN/IRON PO), Take 1 tablet by mouth daily., Disp: , Rfl:     prochlorperazine (COMPAZINE) 5 MG tablet, Take 5-10 mg by mouth every 6 hours as needed for nausea or vomiting., Disp: , Rfl:     ALLERGIES:  No Known Allergies    FAMILY HISTORY:  History reviewed. No pertinent family history.    SOCIAL HISTORY:   Social History     Socioeconomic History    Marital status:      Spouse name: None    Number of children: None    Years of education: None    Highest education level: None   Tobacco Use    Smoking status:  Never    Smokeless tobacco: Never   Substance and Sexual Activity    Alcohol use: Not Currently     Social Drivers of Health     Financial Resource Strain: Low Risk  (3/31/2025)    Financial Resource Strain     Within the past 12 months, have you or your family members you live with been unable to get utilities (heat, electricity) when it was really needed?: No   Food Insecurity: Low Risk  (3/31/2025)    Food Insecurity     Within the past 12 months, did you worry that your food would run out before you got money to buy more?: No     Within the past 12 months, did the food you bought just not last and you didn t have money to get more?: No   Transportation Needs: Low Risk  (3/31/2025)    Transportation Needs     Within the past 12 months, has lack of transportation kept you from medical appointments, getting your medicines, non-medical meetings or appointments, work, or from getting things that you need?: No   Interpersonal Safety: Low Risk  (3/31/2025)    Interpersonal Safety     Do you feel physically and emotionally safe where you currently live?: Yes     Within the past 12 months, have you been hit, slapped, kicked or otherwise physically hurt by someone?: No     Within the past 12 months, have you been humiliated or emotionally abused in other ways by your partner or ex-partner?: No   Housing Stability: Low Risk  (3/31/2025)    Housing Stability     Do you have housing? : Yes     Are you worried about losing your housing?: No       VITALS:  Patient Vitals for the past 24 hrs:   BP Temp Temp src Pulse Resp SpO2   06/10/25 1940 (!) 145/64 -- -- 100 -- 99 %   06/10/25 1925 137/58 -- -- 100 29 100 %   06/10/25 1910 137/63 -- -- 102 -- 100 %   06/10/25 1855 133/63 -- -- 108 24 99 %   06/10/25 1841 (!) 163/63 -- -- 109 24 100 %   06/10/25 1826 (!) 152/72 -- -- 115 29 100 %   06/10/25 1811 (!) 156/63 -- -- 110 20 97 %   06/10/25 1726 138/62 -- -- (!) 124 26 99 %   06/10/25 1711 107/55 97.7  F (36.5  C) Temporal (!)  132 20 99 %        PHYSICAL EXAM    Constitutional: Cachectic female, appears older than stated age, mild distress  HENT:  Normocephalic, Atraumatic. Bilateral external ears normal. Oropharynx moist. Nose normal. Neck- Normal range of motion with no guarding, No midline cervical tenderness, Supple, No stridor.   Eyes:  PERRL, EOMI with no signs of entrapment, Conjunctiva normal, No discharge.   Respiratory:  Normal breath sounds, No respiratory distress, No wheezing.    Cardiovascular:  tachycardic, Normal rhythm, No appreciable rubs or gallops.   GI:  Soft, No tenderness, No distension, No palpable masses  Musculoskeletal:  Intact distal pulses, No edema. Good range of motion in all major joints. No tenderness to palpation or major deformities noted.  Integument:  Warm, Dry, No erythema, No rash.   Neurologic:  Alert & oriented, Normal motor function, Normal sensory function, No focal deficits noted.   Psychiatric:  Affect normal, Judgment normal, Mood normal.     LAB:  All pertinent labs reviewed and interpreted.  Results for orders placed or performed during the hospital encounter of 06/10/25   XR Chest Port 1 View    Impression    IMPRESSION: Lungs are clear. Heart and pulmonary vascularity are normal. No signs of acute disease.   Lactic acid whole blood with 1x repeat in 2 hr when >2   Result Value Ref Range    Lactic Acid, Initial 2.6 (H) 0.7 - 2.0 mmol/L   Comprehensive metabolic panel   Result Value Ref Range    Sodium 127 (L) 135 - 145 mmol/L    Potassium 4.2 3.4 - 5.3 mmol/L    Carbon Dioxide (CO2) 20 (L) 22 - 29 mmol/L    Anion Gap 21 (H) 7 - 15 mmol/L    Urea Nitrogen 42.6 (H) 8.0 - 23.0 mg/dL    Creatinine 1.39 (H) 0.51 - 0.95 mg/dL    GFR Estimate 41 (L) >60 mL/min/1.73m2    Calcium 9.6 8.8 - 10.4 mg/dL    Chloride 86 (L) 98 - 107 mmol/L    Glucose 215 (H) 70 - 99 mg/dL    Alkaline Phosphatase 136 40 - 150 U/L    AST 24 0 - 45 U/L    ALT 10 0 - 50 U/L    Protein Total 7.3 6.4 - 8.3 g/dL    Albumin 3.8  3.5 - 5.2 g/dL    Bilirubin Total 0.5 <=1.2 mg/dL   Result Value Ref Range    Magnesium 2.6 (H) 1.7 - 2.3 mg/dL   CBC with platelets and differential   Result Value Ref Range    WBC Count 10.3 4.0 - 11.0 10e3/uL    RBC Count 4.91 3.80 - 5.20 10e6/uL    Hemoglobin 14.5 11.7 - 15.7 g/dL    Hematocrit 41.1 35.0 - 47.0 %    MCV 84 78 - 100 fL    MCH 29.5 26.5 - 33.0 pg    MCHC 35.3 31.5 - 36.5 g/dL    RDW 16.0 (H) 10.0 - 15.0 %    Platelet Count 789 (H) 150 - 450 10e3/uL    % Neutrophils 71 %    % Lymphocytes 10 %    % Monocytes 17 %    % Eosinophils 0 %    % Basophils 0 %    % Immature Granulocytes 2 %    NRBCs per 100 WBC 0 <1 /100    Absolute Neutrophils 7.3 1.6 - 8.3 10e3/uL    Absolute Lymphocytes 1.0 0.8 - 5.3 10e3/uL    Absolute Monocytes 1.8 (H) 0.0 - 1.3 10e3/uL    Absolute Eosinophils 0.0 0.0 - 0.7 10e3/uL    Absolute Basophils 0.0 0.0 - 0.2 10e3/uL    Absolute Immature Granulocytes 0.2 <=0.4 10e3/uL    Absolute NRBCs 0.0 10e3/uL   Extra Blue Top Tube   Result Value Ref Range    Hold Specimen JIC    Extra Red Top Tube   Result Value Ref Range    Hold Specimen JIC    Result Value Ref Range    Phosphorus 4.1 2.5 - 4.5 mg/dL   RBC and Platelet Morphology   Result Value Ref Range    RBC Morphology Confirmed RBC Indices     Platelet Assessment  Automated Count Confirmed. Platelet morphology is normal.     Automated Count Confirmed. Platelet morphology is normal.    Acanthocytes Slight (A) None Seen   ECG 12-LEAD WITH MUSE (LHE)   Result Value Ref Range    Systolic Blood Pressure 139 mmHg    Diastolic Blood Pressure 66 mmHg    Ventricular Rate 122 BPM    Atrial Rate 122 BPM    NJ Interval 130 ms    QRS Duration 90 ms     ms    QTc 470 ms    P Axis 77 degrees    R AXIS 78 degrees    T Axis 66 degrees    Interpretation ECG       Sinus tachycardia  Biatrial enlargement  Nonspecific ST abnormality  Abnormal ECG  No previous ECGs available  Confirmed by SEE ED PROVIDER NOTE FOR, ECG INTERPRETATION (4000),   MICHAEL ARAIZA ([id number]) on 6/10/2025 8:02:04 PM         RADIOLOGY:  Reviewed all pertinent imaging. Please see official radiology report.  XR Chest Port 1 View   Final Result   IMPRESSION: Lungs are clear. Heart and pulmonary vascularity are normal. No signs of acute disease.          EKG:    Sinus tachycardia.  Rate of 122.  Normal QRS nonspecific ST segment scooping in lateral leads.  No prior tracing for comparison.  I have independently reviewed and interpreted the EKG(s) documented above.          I, Rula Marshall, am serving as a scribe to document services personally performed by David Leyva MD, based on my observation and the provider's statements to me. I, David Leyva MD attest that Rula Marshall is acting in a scribe capacity, has observed my performance of the services and has documented them in accordance with my direction.    David Leyva M.D.  Emergency Medicine  [facility] EMERGENCY DEPARTMENT      David Leyva MD  06/10/25 [id number]       David Leyva MD  06/10/25 [id number]

## 2025-06-10 NOTE — LETTER
06/13/25      To Whom it may concern:    _________________________ was in our Emergency Department today, 06/13/25. with a patient who needed their assistance.  Please excuse them from work/school.      Sincerely,      Electronically signed

## 2025-06-10 NOTE — LETTER
[place] [address]  [address] [address]  [phone]      2025    Kailee Lam  [address] [address]  [phone] (home)     : 1955      To Whom it may concern:    This letter is to confirm that Kailee Lam has been admitted to [facility] on 6/10/2025 and is currently under our care for medical treatment.     Due to her medical condition, the patient is unable to travel as previously planned.  Travel at this time is not medically advisable.          Sincerely,      Trudy Carr MD

## 2025-06-10 NOTE — ED TRIAGE NOTES
Pt has a hx of rectal cancer, pt is on oral and infusion chemo.  Pt has been unable to take in any nutrition since Sunday.  Pt reports she either throws it up or it goes immediately out of her ostomy.  Pt has been taking her nausea medications without relief.  Pt was given an infusion on Saturday [facility], felt good mos tof Sunday then felt ill again.  Last Chemo infusion on Thursday, last oral chemo was on Wednesday.  Pt scheduled to go to [place] on Thursday.       Triage Assessment (Adult)       Row Name 06/10/25 [id number]          Triage Assessment    Airway WDL WDL        Respiratory WDL    Respiratory WDL WDL        Skin Circulation/Temperature WDL    Skin Circulation/Temperature WDL WDL        Cardiac WDL    Cardiac WDL X;rhythm     Pulse Rate & Regularity tachycardic        Peripheral/Neurovascular WDL    Peripheral Neurovascular WDL WDL        Cognitive/Neuro/Behavioral WDL    Cognitive/Neuro/Behavioral WDL WDL

## 2025-06-10 NOTE — ED NOTES
Bed: JNED-28  Expected date:   Expected time:   Means of arrival:   Comments:  ER-- the room is clean

## 2025-06-11 PROBLEM — Z43.2 ENCOUNTER FOR ATTENTION TO ILEOSTOMY (H): Status: ACTIVE | Noted: 2025-06-11

## 2025-06-11 LAB
ADV 40+41 DNA STL QL NAA+NON-PROBE: NEGATIVE
ALBUMIN SERPL BCG-MCNC: 2.5 G/DL (ref 3.5–5.2)
ALBUMIN UR-MCNC: 50 MG/DL
ALP SERPL-CCNC: 85 U/L (ref 40–150)
ALT SERPL W P-5'-P-CCNC: 7 U/L (ref 0–50)
ANION GAP SERPL CALCULATED.3IONS-SCNC: 11 MMOL/L (ref 7–15)
APPEARANCE UR: CLEAR
AST SERPL W P-5'-P-CCNC: 21 U/L (ref 0–45)
ASTRO TYP 1-8 RNA STL QL NAA+NON-PROBE: NEGATIVE
B-OH-BUTYR SERPL-SCNC: 0.29 MMOL/L
BACTERIA #/AREA URNS HPF: ABNORMAL /HPF
BASOPHILS # BLD AUTO: 0 10E3/UL (ref 0–0.2)
BASOPHILS NFR BLD AUTO: 0 %
BILIRUB SERPL-MCNC: 0.3 MG/DL
BILIRUB UR QL STRIP: NEGATIVE
BUN SERPL-MCNC: 26.9 MG/DL (ref 8–23)
BURR CELLS BLD QL SMEAR: SLIGHT
C CAYETANENSIS DNA STL QL NAA+NON-PROBE: NEGATIVE
C DIFF TOX B STL QL: NEGATIVE
CALCIUM SERPL-MCNC: 7.1 MG/DL (ref 8.8–10.4)
CAMPYLOBACTER DNA SPEC NAA+PROBE: NEGATIVE
CHLORIDE SERPL-SCNC: 102 MMOL/L (ref 98–107)
COLOR UR AUTO: YELLOW
CREAT SERPL-MCNC: 0.83 MG/DL (ref 0.51–0.95)
CRYPTOSP DNA STL QL NAA+NON-PROBE: NEGATIVE
E COLI O157 DNA STL QL NAA+NON-PROBE: NORMAL
E HISTOLYT DNA STL QL NAA+NON-PROBE: NEGATIVE
EAEC ASTA GENE ISLT QL NAA+PROBE: NEGATIVE
EC STX1+STX2 GENES STL QL NAA+NON-PROBE: NEGATIVE
EGFRCR SERPLBLD CKD-EPI 2021: 76 ML/MIN/1.73M2
ELLIPTOCYTES BLD QL SMEAR: SLIGHT
EOSINOPHIL # BLD AUTO: 0.1 10E3/UL (ref 0–0.7)
EOSINOPHIL NFR BLD AUTO: 1 %
EPEC EAE GENE STL QL NAA+NON-PROBE: NEGATIVE
ERYTHROCYTE [DISTWIDTH] IN BLOOD BY AUTOMATED COUNT: 15.9 % (ref 10–15)
ETEC LTA+ST1A+ST1B TOX ST NAA+NON-PROBE: NEGATIVE
G LAMBLIA DNA STL QL NAA+NON-PROBE: NEGATIVE
GLUCOSE BLDC GLUCOMTR-MCNC: 121 MG/DL (ref 70–99)
GLUCOSE BLDC GLUCOMTR-MCNC: 134 MG/DL (ref 70–99)
GLUCOSE BLDC GLUCOMTR-MCNC: 140 MG/DL (ref 70–99)
GLUCOSE BLDC GLUCOMTR-MCNC: 142 MG/DL (ref 70–99)
GLUCOSE BLDC GLUCOMTR-MCNC: 152 MG/DL (ref 70–99)
GLUCOSE SERPL-MCNC: 141 MG/DL (ref 70–99)
GLUCOSE UR STRIP-MCNC: NEGATIVE MG/DL
HCO3 SERPL-SCNC: 19 MMOL/L (ref 22–29)
HCT VFR BLD AUTO: 29.1 % (ref 35–47)
HGB BLD-MCNC: 10.2 G/DL (ref 11.7–15.7)
HGB UR QL STRIP: ABNORMAL
HYALINE CASTS: 19 /LPF
IMM GRANULOCYTES # BLD: 0.1 10E3/UL
IMM GRANULOCYTES NFR BLD: 2 %
KETONES UR STRIP-MCNC: ABNORMAL MG/DL
LEUKOCYTE ESTERASE UR QL STRIP: NEGATIVE
LYMPHOCYTES # BLD AUTO: 1.6 10E3/UL (ref 0.8–5.3)
LYMPHOCYTES NFR BLD AUTO: 20 %
MAGNESIUM SERPL-MCNC: 1.9 MG/DL (ref 1.7–2.3)
MCH RBC QN AUTO: 30 PG (ref 26.5–33)
MCHC RBC AUTO-ENTMCNC: 35.1 G/DL (ref 31.5–36.5)
MCV RBC AUTO: 86 FL (ref 78–100)
MONOCYTES # BLD AUTO: 1.7 10E3/UL (ref 0–1.3)
MONOCYTES NFR BLD AUTO: 20 %
MUCOUS THREADS #/AREA URNS LPF: PRESENT /LPF
NEUTROPHILS # BLD AUTO: 4.7 10E3/UL (ref 1.6–8.3)
NEUTROPHILS NFR BLD AUTO: 58 %
NITRATE UR QL: NEGATIVE
NOROVIRUS GI+II RNA STL QL NAA+NON-PROBE: NEGATIVE
NRBC # BLD AUTO: 0 10E3/UL
NRBC BLD AUTO-RTO: 0 /100
P SHIGELLOIDES DNA STL QL NAA+NON-PROBE: NEGATIVE
PH UR STRIP: 6 [PH] (ref 5–7)
PHOSPHATE SERPL-MCNC: 1.7 MG/DL (ref 2.5–4.5)
PHOSPHATE SERPL-MCNC: 2.1 MG/DL (ref 2.5–4.5)
PLAT MORPH BLD: ABNORMAL
PLATELET # BLD AUTO: 533 10E3/UL (ref 150–450)
POTASSIUM SERPL-SCNC: 2.9 MMOL/L (ref 3.4–5.3)
POTASSIUM SERPL-SCNC: 3.3 MMOL/L (ref 3.4–5.3)
POTASSIUM SERPL-SCNC: 3.5 MMOL/L (ref 3.4–5.3)
PROT SERPL-MCNC: 4.7 G/DL (ref 6.4–8.3)
RBC # BLD AUTO: 3.4 10E6/UL (ref 3.8–5.2)
RBC MORPH BLD: ABNORMAL
RBC URINE: 8 /HPF
RVA RNA STL QL NAA+NON-PROBE: NEGATIVE
SALMONELLA SP RPOD STL QL NAA+PROBE: NEGATIVE
SAPO I+II+IV+V RNA STL QL NAA+NON-PROBE: NEGATIVE
SHIGELLA SP+EIEC IPAH ST NAA+NON-PROBE: NEGATIVE
SODIUM SERPL-SCNC: 132 MMOL/L (ref 135–145)
SP GR UR STRIP: 1.03 (ref 1–1.03)
SQUAMOUS EPITHELIAL: <1 /HPF
UROBILINOGEN UR STRIP-MCNC: NORMAL MG/DL
V CHOLERAE DNA SPEC QL NAA+PROBE: NEGATIVE
VIBRIO DNA SPEC NAA+PROBE: NEGATIVE
WBC # BLD AUTO: 8.2 10E3/UL (ref 4–11)
WBC URINE: 3 /HPF
Y ENTEROCOL DNA STL QL NAA+PROBE: NEGATIVE

## 2025-06-11 PROCEDURE — 36415 COLL VENOUS BLD VENIPUNCTURE: CPT | Performed by: INTERNAL MEDICINE

## 2025-06-11 PROCEDURE — 250N000013 HC RX MED GY IP 250 OP 250 PS 637: Performed by: PHYSICIAN ASSISTANT

## 2025-06-11 PROCEDURE — 250N000012 HC RX MED GY IP 250 OP 636 PS 637: Performed by: INTERNAL MEDICINE

## 2025-06-11 PROCEDURE — 83735 ASSAY OF MAGNESIUM: CPT

## 2025-06-11 PROCEDURE — 84100 ASSAY OF PHOSPHORUS: CPT

## 2025-06-11 PROCEDURE — 250N000013 HC RX MED GY IP 250 OP 250 PS 637

## 2025-06-11 PROCEDURE — 87507 IADNA-DNA/RNA PROBE TQ 12-25: CPT

## 2025-06-11 PROCEDURE — 82010 KETONE BODYS QUAN: CPT | Performed by: INTERNAL MEDICINE

## 2025-06-11 PROCEDURE — 120N000001 HC R&B MED SURG/OB

## 2025-06-11 PROCEDURE — 82962 GLUCOSE BLOOD TEST: CPT

## 2025-06-11 PROCEDURE — 258N000003 HC RX IP 258 OP 636: Performed by: INTERNAL MEDICINE

## 2025-06-11 PROCEDURE — 87493 C DIFF AMPLIFIED PROBE: CPT | Performed by: PHYSICIAN ASSISTANT

## 2025-06-11 PROCEDURE — 84155 ASSAY OF PROTEIN SERUM: CPT

## 2025-06-11 PROCEDURE — 81001 URINALYSIS AUTO W/SCOPE: CPT | Performed by: INTERNAL MEDICINE

## 2025-06-11 PROCEDURE — 96361 HYDRATE IV INFUSION ADD-ON: CPT

## 2025-06-11 PROCEDURE — 36415 COLL VENOUS BLD VENIPUNCTURE: CPT

## 2025-06-11 PROCEDURE — 85004 AUTOMATED DIFF WBC COUNT: CPT

## 2025-06-11 PROCEDURE — 250N000009 HC RX 250: Performed by: INTERNAL MEDICINE

## 2025-06-11 PROCEDURE — 250N000013 HC RX MED GY IP 250 OP 250 PS 637: Performed by: INTERNAL MEDICINE

## 2025-06-11 PROCEDURE — 84100 ASSAY OF PHOSPHORUS: CPT | Performed by: INTERNAL MEDICINE

## 2025-06-11 PROCEDURE — 84132 ASSAY OF SERUM POTASSIUM: CPT | Performed by: INTERNAL MEDICINE

## 2025-06-11 PROCEDURE — 250N000011 HC RX IP 250 OP 636: Performed by: INTERNAL MEDICINE

## 2025-06-11 PROCEDURE — 250N000011 HC RX IP 250 OP 636

## 2025-06-11 PROCEDURE — 99232 SBSQ HOSP IP/OBS MODERATE 35: CPT | Performed by: INTERNAL MEDICINE

## 2025-06-11 PROCEDURE — 999N000198 HC STATISTIC WOC PT EDUCATION, 16-30 MIN

## 2025-06-11 PROCEDURE — G0378 HOSPITAL OBSERVATION PER HR: HCPCS

## 2025-06-11 RX ORDER — ONDANSETRON 4 MG/1
4 TABLET, FILM COATED ORAL
Status: COMPLETED | OUTPATIENT
Start: 2025-06-11 | End: 2025-06-12

## 2025-06-11 RX ORDER — SODIUM CHLORIDE 9 MG/ML
INJECTION, SOLUTION INTRAVENOUS CONTINUOUS
Status: DISCONTINUED | OUTPATIENT
Start: 2025-06-11 | End: 2025-06-11

## 2025-06-11 RX ORDER — SODIUM CHLORIDE 9 MG/ML
INJECTION, SOLUTION INTRAVENOUS CONTINUOUS
Status: DISCONTINUED | OUTPATIENT
Start: 2025-06-11 | End: 2025-06-13

## 2025-06-11 RX ORDER — POTASSIUM CHLORIDE 1500 MG/1
40 TABLET, EXTENDED RELEASE ORAL ONCE
Status: COMPLETED | OUTPATIENT
Start: 2025-06-11 | End: 2025-06-11

## 2025-06-11 RX ORDER — POTASSIUM CHLORIDE 750 MG/1
10 TABLET, EXTENDED RELEASE ORAL ONCE
Status: COMPLETED | OUTPATIENT
Start: 2025-06-11 | End: 2025-06-11

## 2025-06-11 RX ORDER — POTASSIUM CHLORIDE 1500 MG/1
20 TABLET, EXTENDED RELEASE ORAL ONCE
Status: COMPLETED | OUTPATIENT
Start: 2025-06-11 | End: 2025-06-11

## 2025-06-11 RX ORDER — CALCIUM CARBONATE 500 MG/1
1000 TABLET, CHEWABLE ORAL 3 TIMES DAILY
Status: COMPLETED | OUTPATIENT
Start: 2025-06-11 | End: 2025-06-11

## 2025-06-11 RX ADMIN — POTASSIUM & SODIUM PHOSPHATES POWDER PACK 280-160-250 MG 2 PACKET: 280-160-250 PACK at 08:32

## 2025-06-11 RX ADMIN — DIPHENOXYLATE HYDROCHLORIDE AND ATROPINE SULFATE 1 TABLET: .025; 2.5 TABLET ORAL at 17:44

## 2025-06-11 RX ADMIN — DEXTROSE AND SODIUM CHLORIDE: 5; 900 INJECTION, SOLUTION INTRAVENOUS at 09:08

## 2025-06-11 RX ADMIN — DIPHENOXYLATE HYDROCHLORIDE AND ATROPINE SULFATE 1 TABLET: .025; 2.5 TABLET ORAL at 08:48

## 2025-06-11 RX ADMIN — ONDANSETRON HYDROCHLORIDE 4 MG: 4 TABLET, FILM COATED ORAL at 05:35

## 2025-06-11 RX ADMIN — CALCIUM CARBONATE (ANTACID) CHEW TAB 500 MG 1000 MG: 500 CHEW TAB at 21:19

## 2025-06-11 RX ADMIN — ONDANSETRON HYDROCHLORIDE 4 MG: 4 TABLET, FILM COATED ORAL at 12:12

## 2025-06-11 RX ADMIN — SODIUM CHLORIDE: 0.9 INJECTION, SOLUTION INTRAVENOUS at 16:23

## 2025-06-11 RX ADMIN — POTASSIUM CHLORIDE 40 MEQ: 1500 TABLET, EXTENDED RELEASE ORAL at 08:32

## 2025-06-11 RX ADMIN — DIPHENOXYLATE HYDROCHLORIDE AND ATROPINE SULFATE 1 TABLET: .025; 2.5 TABLET ORAL at 14:36

## 2025-06-11 RX ADMIN — POTASSIUM & SODIUM PHOSPHATES POWDER PACK 280-160-250 MG 2 PACKET: 280-160-250 PACK at 12:12

## 2025-06-11 RX ADMIN — METHYLCELLULOSE 1000 MG: 500 TABLET ORAL at 14:36

## 2025-06-11 RX ADMIN — INSULIN ASPART 1 UNITS: 100 INJECTION, SOLUTION INTRAVENOUS; SUBCUTANEOUS at 17:44

## 2025-06-11 RX ADMIN — POTASSIUM CHLORIDE 20 MEQ: 1500 TABLET, EXTENDED RELEASE ORAL at 14:25

## 2025-06-11 RX ADMIN — ONDANSETRON HYDROCHLORIDE 4 MG: 4 TABLET, FILM COATED ORAL at 17:44

## 2025-06-11 RX ADMIN — CALCIUM CARBONATE (ANTACID) CHEW TAB 500 MG 1000 MG: 500 CHEW TAB at 13:41

## 2025-06-11 RX ADMIN — CALCIUM CARBONATE (ANTACID) CHEW TAB 500 MG 1000 MG: 500 CHEW TAB at 08:33

## 2025-06-11 RX ADMIN — SODIUM PHOSPHATE, MONOBASIC, MONOHYDRATE AND SODIUM PHOSPHATE, DIBASIC, ANHYDROUS 9 MMOL: 142; 276 INJECTION, SOLUTION INTRAVENOUS at 21:19

## 2025-06-11 RX ADMIN — ATORVASTATIN CALCIUM 40 MG: 40 TABLET, FILM COATED ORAL at 08:33

## 2025-06-11 RX ADMIN — POTASSIUM & SODIUM PHOSPHATES POWDER PACK 280-160-250 MG 2 PACKET: 280-160-250 PACK at 16:22

## 2025-06-11 RX ADMIN — POTASSIUM CHLORIDE 10 MEQ: 750 TABLET, EXTENDED RELEASE ORAL at 21:19

## 2025-06-11 ASSESSMENT — ACTIVITIES OF DAILY LIVING (ADL)
ADLS_ACUITY_SCORE: 61
ADLS_ACUITY_SCORE: 59
ADLS_ACUITY_SCORE: 61
ADLS_ACUITY_SCORE: 62
ADLS_ACUITY_SCORE: 61
ADLS_ACUITY_SCORE: 59
ADLS_ACUITY_SCORE: 61
ADLS_ACUITY_SCORE: 62
ADLS_ACUITY_SCORE: 61
ADLS_ACUITY_SCORE: 59
ADLS_ACUITY_SCORE: 65
ADLS_ACUITY_SCORE: 59
ADLS_ACUITY_SCORE: 61
ADLS_ACUITY_SCORE: 64
ADLS_ACUITY_SCORE: 64
ADLS_ACUITY_SCORE: 41
DEPENDENT_IADLS:: CLEANING;COOKING;LAUNDRY;SHOPPING;MEAL PREPARATION;MEDICATION MANAGEMENT;TRANSPORTATION
ADLS_ACUITY_SCORE: 41
ADLS_ACUITY_SCORE: 64
ADLS_ACUITY_SCORE: 61
ADLS_ACUITY_SCORE: 61
ADLS_ACUITY_SCORE: 64

## 2025-06-11 NOTE — PLAN OF CARE
Goal Outcome Evaluation:      Plan of Care Reviewed With: patient, spouse    Overall Patient Progress: improvingOverall Patient Progress: improving    Outcome Evaluation: Anticipate discharge home.

## 2025-06-11 NOTE — H&P
[facility]    History and Physical - Hospitalist Service       Date of Admission:  6/10/2025    Assessment & Plan      Kailee Lam is a 70 year old female admitted on 6/10/2025.  Medical history notable for rectal adenocarcinoma s/p ileostomy, hypertension, migraine, hyperlipidemia, osteoporosis, allergic rhinitis.  Presented to emergency department with nausea vomiting.  Found to have JOSEPH, admitted for IV fluid resuscitation.     #Acute kidney injury  #Hyponatremia  #Nausea/vomiting  Presented to the [facility] emergency department 6/7 for decreased oral intake with recent chemotherapy, improved with 2 L of IV fluid resuscitation.  Represented to [facility] emergency department for similar concern nausea/vomiting, general malaise, increased ileostomy output.  Found to have creatinine of 1.39 with BUN of 42.  Noted difficulty with p.o. intake. Sodium 127. Suspect prerenal JOSEPH in setting of decreased oral intake. Will utilize LR for IV fluid resuscitation to prevent rapid correction of sodium.   -Continue IV fluid resuscitation  -Monitor electrolytes  -Scheduled Zofran  -Repeat BMP in a.m.  -Encourage p.o. intake  -Will obtain enteric panel with increased ostomy output    # Anion gap metabolic acidosis  #Suspected starvation ketosis  Noted to have elevated anion gap of 21.  Lactic initially 2.6, 1.7 on repeat. Patient has very poor p.o. intake.  Would suspect likely has some degree of starvation ketosis  -Obtain serum ketones  -IV fluid resuscitation as above  -Recheck BMP in a.m.    #Rectal adenocarcinoma s/p ileostomy 3/2025  Follows with [place] oncology.  Noted to have rectal adenocarcinoma Stage IIIB(T4a, N1b, M0) moderately differentiated adenocarcinoma of the rectum.  Currently undergoing chemotherapy of capecitabine and oxaliplatin.  Originally planned on traveling to [place] June 12th. Per oncology note will delay next cycle of chemotherapy until she returns.  Patient states that he no  longer plan on traveling.   - Follow-up with oncology as previously scheduled  - CBC in a.m.    #Thrombocytosis  Upon admission platelets 789.  Suspect some degree of hemoconcentration.  - Recheck CBC in a.m.    #Prediabetes  Was on metformin.  Reports oncology has recently had her hold medication.  - Hold PTA metformin  - POC glucose monitoring    #Osteoporosis  Historically took Fosamax prior to arrival.  Reports that oncology recently has held Fosamax.   - Will defer to PCP/oncology to resume Fosamax when indicated    #Suspected severe protein/calorie malnutrition  #Cachexia  #At risk for refeeding syndrome  Current BMI 15.19.  Reports poor p.o. intake.  Does not wish to speak with nutrition at this time.  - Would recommend further discussing possible nutrition consult  -Scheduled Zofran as above to encourage p.o. intake  -Order mag/Phos with BMP in a.m.          Diet: Regular diet  DVT Prophylaxis: Heparin SQ  Mendoza Catheter: Not present  Lines: None     Cardiac Monitoring: None  Code Status: Full    Clinically Significant Risk Factors Present on Admission         # Hyponatremia: Lowest Na = 127 mmol/L in last 2 days, will monitor as appropriate  # Hypochloremia: Lowest Cl = 86 mmol/L in last 2 days, will monitor as appropriate     # Anion Gap Metabolic Acidosis: Highest Anion Gap = 21 mmol/L in last 2 days, will monitor and treat as appropriate     # Acute Kidney Injury, unspecified: based on a >150% or 0.3 mg/dL increase in last creatinine compared to past 90 day average, will monitor renal function  # Hypertension: Home medication list includes antihypertensive(s)                      Disposition Plan     Medically Ready for Discharge: Anticipated Tomorrow     The patient's care was discussed with the Attending Physician, Dr. Kalin Baker.    Ramiro Glover PA-C  Hospitalist Service  [facility],   Securely message with [employer] (more info)  Text page via [employer] Paging/Directory      ______________________________________________________________________    Chief Complaint   Acute kidney injury    History is obtained from the patient    History of Present Illness   Kailee Lam is a 70 year old female admitted on 6/10/2025.  Medical history notable for rectal adenocarcinoma s/p ileostomy, hypertension, migraine, hyperlipidemia, osteoporosis, allergic rhinitis.  Presented to emergency department with nausea vomiting.  Found to have JOSEPH, admitted for IV fluid resuscitation.  Saw patient in room on unit.  Completed by  at bedside.  Confirms prior to arrival history, previous similar presentation of nausea vomiting with current chemotherapy.  No significant chest pain or shortness of breath.  Patient overall unable to tolerate p.o. intake.  Is open to schedule antiemetics to potentially aid with eating.  Additionally does note some increased output in ostomy bag. No other significant concerns at this time    Past Medical History    Past Medical History:   Diagnosis Date    Dysplastic nevus     Gross hematuria     Hypertension     Intractable migraine without aura and with status migrainosus     Mixed hyperlipidemia     Osteoporosis     Rectal adenocarcinoma (H)     Seasonal allergic rhinitis        Past Surgical History   Past Surgical History:   Procedure Laterality Date    COLECTOMY, LOW ANTERIOR, ROBOT-ASSISTED, LAPAROSCOPIC, USING DA [facility]per XI N/A 3/31/2025    Procedure: ROBOTIC LOW ANTERIOR RESECTION, WITH DIVERTERTING LOOP ILEOSTOMY;  Surgeon: Ekaterina Ramos MD;  Location: [facility] OR       Prior to Admission Medications   Prior to Admission Medications   Prescriptions Last Dose Informant Patient Reported? Taking?   Calcium Carbonate-Vitamin D 250-3.125 MG-MCG TABS Past Month Self Yes Yes   Sig: Take 1 Tablet (250 mg elemental Ca) by mouth daily.   Multiple Vitamins-Iron (MULTIVITAMIN/IRON PO) Past Month Self Yes Yes   Sig: Take 1 tablet by mouth daily.   alendronate  (FOSAMAX) 70 MG tablet Past Month Self Yes Yes   Sig: Take 1 Tablet (70 mg) by mouth once every week. Take 30 minutes before first food-drink-medication. Avoid lying down for 30 minutes.*   atorvastatin (LIPITOR) 40 MG tablet Past Month Self Yes Yes   Sig: Take 1 Tablet (40 mg) by mouth daily.*   capecitabine (XELODA) 500 MG tablet Past Month Self Yes Yes   Sig: Take 500 mg by mouth 2 times daily.   diphenoxylate-atropine (LOMOTIL) 2.5-0.025 MG tablet 6/10/2025 Morning Self Yes Yes   Sig: Take 1 tablet by mouth 4 times daily as needed for diarrhea.   lisinopril (ZESTRIL) 5 MG tablet Past Month Self Yes Yes   Sig: Take 1 Tablet (5 mg) by mouth daily.*   metFORMIN (GLUCOPHAGE) 500 MG tablet Past Month  Yes Yes   Sig: Take 500 mg by mouth daily (with dinner).   prochlorperazine (COMPAZINE) 5 MG tablet 6/10/2025 Morning Self Yes Yes   Sig: Take 5-10 mg by mouth every 6 hours as needed for nausea or vomiting.      Facility-Administered Medications: None        Physical Exam   Vital Signs: Temp: 97.9  F (36.6  C) Temp src: Oral BP: 134/61 Pulse: 88   Resp: 16 SpO2: 99 % O2 Device: None (Room air)    Weight: 85 lbs 12.14 oz    Exam:  General: Appears stated age, frail  Head: Normocephalic.  Cardiovascular: S1/S2, Normal rate and rhythm   Respiratory: Normal respiratory effort, lung fields clear to auscultation  Gastrointestinal: Bowel sounds normal, no t significant enderness to palpation, ostomy in place, no surrounding erythema  Musculoskeletal: Significantly decreased muscle tone  Neuropsych: Speaks clearly, answers questions appropriately    Medical Decision Making       75 MINUTES SPENT BY ME on the date of service doing chart review, history, exam, documentation & further activities per the note.      Data   ------------------------- PAST 24 HR DATA REVIEWED -----------------------------------------------

## 2025-06-11 NOTE — PROGRESS NOTES
[facility]    Medicine Progress Note - Hospitalist Service    Date of Admission:  6/10/2025    Assessment & Plan   Kailee Lam is a 70 year old female admitted on 6/10/2025.  Medical history notable for rectal adenocarcinoma s/p ileostomy, hypertension, migraine, hyperlipidemia, osteoporosis, allergic rhinitis.  Presented to emergency department with nausea vomiting.  Found to have JOSEPH, admitted for IV fluid resuscitation.     #Acute kidney injury; due to GI loss  # Electrolyte imbalance; due to GI loss  #Anion gap metabolic acidosis, suspected starvation ketosis  Presented to the [facility] emergency department 6/7 for decreased oral intake with recent chemotherapy, improved with 2 L of IV fluid resuscitation.  Represented to [facility] emergency department for similar concern nausea/vomiting, general malaise, increased ileostomy output.  Found to have creatinine of 1.39 with BUN of 42.  Noted difficulty with p.o. intake. Sodium 127. Suspect prerenal JOSEPH in setting of decreased oral intake.   - Responding to IV fluid, continue  - Replace electrolytes per protocol.    -Serum sodium trending up.  - Monitor labs closely    #Nausea, vomiting and high output ileostomy;  #CT imaging with diffuse thickening of distal ileum, worrisome for enteritis;  -Concern due to chemotherapy, also rule out infective etiology  -C. difficile test, enteric bacterial virus panel and process  -Symptomatic treatment.    -GI consulted, awaiting input, likely medications to slow down high output from ileostomy, defer to GI    #Rectal adenocarcinoma s/p ileostomy 3/2025  Follows with [place] [facility].  Noted to have rectal adenocarcinoma Stage IIIB(T4a, N1b, M0) moderately differentiated adenocarcinoma of the rectum.  Currently undergoing chemotherapy of capecitabine and oxaliplatin.  Originally planned on traveling to [place] June 12th. Per oncology note will delay next cycle of chemotherapy until she returns.  Patient  states that he no longer plan on traveling.   - Follow-up with oncology as previously scheduled  - Admission hemoglobin due to hemoconcentration.      #Thrombocytosis  Upon admission platelets 789.  Suspect some degree of hemoconcentration.  - Recheck trending down    #Prediabetes  Was on metformin.  Reports oncology has recently had her hold medication.  - Hold PTA metformin  - Insulin sliding scale and hypoglycemia protocol    #Suspected severe protein/calorie malnutrition  Current BMI 15.19.  Reports poor p.o. intake.  Does not wish to speak with nutrition at this time.  -Scheduled Zofran for few doses.         Observation Goals: -diagnostic tests and consults completed and resulted, -vital signs normal or at patient baseline, -tolerating oral intake to maintain hydration, Nurse to notify provider when observation goals have been met and patient is ready for discharge.  Diet: Regular Diet Adult    DVT Prophylaxis: Heparin SQ  Mendoza Catheter: Not present  Lines: None     Cardiac Monitoring: None  Code Status: Full Code      Clinically Significant Risk Factors Present on Admission        # Hypokalemia: Lowest K = 2.9 mmol/L in last 2 days, will replace as needed  # Hyponatremia: Lowest Na = 127 mmol/L in last 2 days, will monitor as appropriate  # Hypochloremia: Lowest Cl = 86 mmol/L in last 2 days, will monitor as appropriate   # Hypercalcemia: corrected calcium is >10.1, will monitor as appropriate   # Anion Gap Metabolic Acidosis: Highest Anion Gap = 21 mmol/L in last 2 days, will monitor and treat as appropriate  # Hypoalbuminemia: Lowest albumin = 2.5 g/dL at 6/11/2025  6:23 AM, will monitor as appropriate     # Hypertension: Home medication list includes antihypertensive(s)      # Anemia: based on hgb <11                  Social Drivers of Health            Disposition Plan     Medically Ready for Discharge: Anticipated Tomorrow             Danilo ANGUIANO MD  Hospitalist Service  [facility]  Hospital  Securely message with [employer] (more info)  Text page via [facility] Paging/Directory   ______________________________________________________________________    Interval History   Patient is new to me.  Patient seen and examined.  Notes, labs, imaging report personally reviewed.  Patient reported nausea improving, no significant abdominal pain, still concerned about increased ileostomy output, but no blood, black output from ileostomy.  Denied feeling fever or chills.  No reported short of breath or chest pain.  Discussed with nursing staff show  Discussed with family members at bedside.  Patient requesting female provider, female nursing staffs.  I will request one of our female hospitalist to follow patient's from tomorrow.    Physical Exam   Vital Signs: Temp: 97.5  F (36.4  C) Temp src: Oral BP: 127/57 Pulse: 89   Resp: 18 SpO2: 99 % O2 Device: None (Room air)    Weight: 86 lbs 4.8 oz    General: Not in obvious distress.  HEENT: Normocephalic, supple neck  Neuro; alert and awake, follows simple commands appropriately, moves all extremities      Medical Decision Making             Data     I have personally reviewed the following data over the past 24 hrs:    8.2  \   10.2 (L)   / 533 (H)     132 (L) 102 26.9 (H) /  134 (H)   2.9 (L) 19 (L) 0.83 \     ALT: 7 AST: 21 AP: 85 TBILI: 0.3   ALB: 2.5 (L) TOT PROTEIN: 4.7 (L) LIPASE: N/A     Procal: N/A CRP: N/A Lactic Acid: 1.7         Imaging results reviewed over the past 24 hrs:   Recent Results (from the past 24 hours)   XR Chest Port 1 View    Narrative    EXAM: XR CHEST PORT 1 VIEW  LOCATION: [facility]  DATE: 6/10/2025    INDICATION: Malaise, nausea and vomiting. Rectal cancer.  COMPARISON: CT CAP 4/25/2025      Impression    IMPRESSION: Lungs are clear. Heart and pulmonary vascularity are normal. No signs of acute disease.   CT Abdomen Pelvis w/o Contrast    Narrative    EXAM: CT ABDOMEN PELVIS W/O CONTRAST  LOCATION: [facility]  DATE: 6/10/2025    INDICATION: Persistent nausea and vomiting post ileostomy.  COMPARISON: CT chest, abdomen and pelvis with IV contrast 03/03/2025.  TECHNIQUE: CT scan of the abdomen and pelvis was performed without IV contrast. Multiplanar reformats were obtained. Dose reduction techniques were used.  CONTRAST: None.    FINDINGS:   LOWER CHEST: Minor strand of linear atelectasis in the inferior lingula and the right lower lobe. No pleural effusion on either side. Normal cardiac size. No pericardial effusion. No significant change.    HEPATOBILIARY: Small hypodensity in the left hepatic lobe anteriorly measures 0.6 cm (image 43, series 3), unchanged. No calcified gallstones, biliary dilatation or adjacent inflammation.    PANCREAS: Normal.    SPLEEN: Normal.    ADRENAL GLANDS: Normal.    KIDNEYS/BLADDER: No urinary tract calculi. Both kidneys are negative for hydronephrosis or hydroureter. Normal urinary bladder.    BOWEL: Interval right lower quadrant ileostomy. Mild diffuse thickening of the distal ileum (images 101-135, series 4, images 23-41, series 4, images 52-63, series 5), worrisome for ongoing enteritis (infectious or inflammatory). No mechanical   obstruction, perforation or abscess formation. Postoperative changes in the distal rectum. Sigmoid diverticulosis.    LYMPH NODES: No suspicious abdominopelvic adenopathy.    VASCULATURE: Atherosclerotic normal caliber distal abdominal aorta measuring 1.4 x 1.4 cm (image 78, series 3). Normal caliber IVC.    PELVIC ORGANS: Distal colonic diverticulosis. Interval postoperative changes in the distal rectum. Postmenopausal uterus. Atherosclerotic changes. No adenopathy or free fluid.    MUSCULOSKELETAL: Degenerative narrowing of the lumbosacral interspace.      Impression    IMPRESSION:   1.  Interval right lower quadrant ileostomy. Diffuse thickening of the distal ileum, worrisome for ongoing enteritis (infectious or inflammatory). No  mechanical obstruction, perforation or abscess formation. Postoperative changes in the distal rectum.   Sigmoid diverticulosis.    2.  Stable small left hepatic lobe hypodensity.

## 2025-06-11 NOTE — PROGRESS NOTES
Care Management Note:    Dr. Leyva the ER physician visited with me to inquire about potentially arranging some outpatient infusions for Kailee. I spoke with him around 6:00 pm and subsequently, given the time, I couldn't reach out to any of our infusion clinics or home infusion to discuss options. It sounds like he's thinking it could be beneficial for Kailee to receive some sort of maintenance infusions to help her compensate for her high volume loss via her ostomy.     Patient admitted via Observation status and Care Management consult is in place. I went to visit with patient and her family to let them know we would meet with them tomorrow however, patient had left the ED and moved up to P2.     Our care management team will connect with Kailee and her family tomorrow, 6/11.    Anastasiia Natarajan RN  [facility] ED Care Manager  Desk Phone #: [phone]  Cell #: [phone]

## 2025-06-11 NOTE — PLAN OF CARE
Goal Outcome Evaluation:  Patient is alert and oriented and able to make needs known. Spouse, Rahul, has been here with patient all day although patient prefers he leave the room for cares. Patient has requested only female providers. Hospitalist aware and will attempt to accommodate tomorrow. Charge nurse aware for nursing care.  Benny pain.  Started eating regular diet today. Ate 75% of breakfast and 50% of lunch. On scheduled zofran now with meal which patient feels is making a big difference.  and 134. No sliding scale insulin needed.  High output from colostomy- 2750ml out on day shift. On electrolyte replacement protocols (K and Phos rechecks scheduled for 1930) and IVF hydration. Waiting on eves SWAT to place PIV, day SWAT unable/PICC also paged with no response. Colostomy did begin leaking earlier and appliance was changed. Clean and intact at this time.  WOCN saw today.  Up independently in room and to BR.   Cdiff was sent-result negative. Still waiting on other enteric bacteria results. In enteric isolation.

## 2025-06-11 NOTE — PHARMACY-ADMISSION MEDICATION HISTORY
Pharmacist Admission Medication History    Admission medication history is complete. The information provided in this note is only as accurate as the sources available at the time of the update.    Information Source(s): Patient, Family member, and CareEverywhere/SureScripts via in-person    Pertinent Information: patient and spouse report all of patient's usual medications are on hold per oncologist while oral chemo started. They report the medications will be evaluated by patient's primary care provider for when to resume them.   Xeloda - patient reports she is off of the medication until she restarts it on 6/23/25.    Changes made to PTA medication list:  Added: Compazine, Xeloda, Lomotil  Deleted: None  Changed: metformin  mg daily -> metformin immediate release 500 mg/day (per fill history)    Allergies reviewed with patient and updates made in EHR: yes    Medication History Completed By: Nicollette McMann, RPH 6/10/2025 8:04 PM    PTA Med List   Medication Sig Note Last Dose/Taking    alendronate (FOSAMAX) 70 MG tablet Take 1 Tablet (70 mg) by mouth once every week. Take 30 minutes before first food-drink-medication. Avoid lying down for 30 minutes.* 6/10/2025: Medication on hold while oral chemo started. Past Month    atorvastatin (LIPITOR) 40 MG tablet Take 1 Tablet (40 mg) by mouth daily.* 6/10/2025: Medication on hold while oral chemo started. Past Month    Calcium Carbonate-Vitamin D 250-3.125 MG-MCG TABS Take 1 Tablet (250 mg elemental Ca) by mouth daily.  Past Month    capecitabine (XELODA) 500 MG tablet Take 500 mg by mouth 2 times daily. 6/10/2025: Medication due to re-start on 6/23/25. Past Month    diphenoxylate-atropine (LOMOTIL) 2.5-0.025 MG tablet Take 1 tablet by mouth 4 times daily as needed for diarrhea.  6/10/2025 Morning    lisinopril (ZESTRIL) 5 MG tablet Take 1 Tablet (5 mg) by mouth daily.* 6/10/2025: Medication on hold while oral chemo started. Past Month    metFORMIN (GLUCOPHAGE  XR) 500 MG 24 hr tablet Take 500 mg by mouth daily (with dinner). 6/10/2025: Medication on hold while oral chemo started. Past Month    Multiple Vitamins-Iron (MULTIVITAMIN/IRON PO) Take 1 tablet by mouth daily.  Past Month    prochlorperazine (COMPAZINE) 5 MG tablet Take 5-10 mg by mouth every 6 hours as needed for nausea or vomiting.  6/10/2025 Morning

## 2025-06-11 NOTE — CONSULTS
GI CONSULT NOTE      Name: Kailee Lam  Medical Record #: [id number]  YOB: 1955  Date of Admission: 6/10/2025  Date/Time: 6/11/2025/12:48 PM     CHIEF COMPLAINT: Dehydration     HISTORY OF PRESENT ILLNESS: We were asked to see Kailee Lam by Dr Carrasco for evaluation of nausea, vomiting, and liquid ileostomy output. Kailee Lam is a 70 year old year old female with history of hypertension, osteoporosis, rhinitis, hyperlipidemia, seasonal allergies, stage IIIb adenocarcinoma diagnosed February 2025 s/p resection and ileostomy, currently treated with Capecitabine and Oxaliplatin (started May 1st, last cycle completed Thurs 6/5/25) who presented to the ER with symptoms of dehydration, intermittent nausea/vomiting, and liquid ileostomy output.  Laboratory studies revealed acute kidney injury (Cr 1.39 and BUN 42 vs. Baseline Cr 0.46 4/3/25), lactate 2.6, normal liver tests, and unremarkable CBC with the exception of platelets elevated to 789K.   CT abdomen and pelvis without contrast showed an ileostomy in the right lower quadrant with diffuse thickening of the distal ileum concerning for enteritis.  Stool testing for C. difficile and enteric pathogens is negative.    The patient's primary reason for coming to the hospital was concern for dehydration.  She reports recently completing her second cycle of chemotherapy for rectal cancer (completed Thursday, 6/5/2025).  She notes that during her cycles, she will often have intermittent nausea as well as intermittent vomiting.  She has used Zofran on an as-needed basis.  Additionally, while receiving chemotherapy, her ileostomy output changes and becomes liquid (not formed as typical). She typically has brown formed output from her ileostomy and empties her bag 6X/day. She denies having to change her ileostomy bag more frequently during her treatment cycles.   She was recommended to use Imodium as needed but has not been taking this with any  regularity.  She believes the GI symptoms she experienced during her second cycle of treatment were more prominent than those she experienced during her first cycle.      She and her  had plans to travel to [place] tomorrow.  She has canceled her trip.    REVIEW OF SYSTEMS (ROS): Complete review of systems negative other than listed in HPI.    PAST MEDICAL HISTORY:  Past Medical History:   Diagnosis Date    Dysplastic nevus     Gross hematuria     Hypertension     Intractable migraine without aura and with status migrainosus     Mixed hyperlipidemia     Osteoporosis     Rectal adenocarcinoma (H)     Seasonal allergic rhinitis         FAMILY HISTORY:  History reviewed. No pertinent family history.    SOCIAL HISTORY:        MEDICATIONS PRIOR TO ADMISSION:   Medications Prior to Admission   Medication Sig Dispense Refill Last Dose/Taking    alendronate (FOSAMAX) 70 MG tablet Take 1 Tablet (70 mg) by mouth once every week. Take 30 minutes before first food-drink-medication. Avoid lying down for 30 minutes.*   Past Month    atorvastatin (LIPITOR) 40 MG tablet Take 1 Tablet (40 mg) by mouth daily.*   Past Month    Calcium Carbonate-Vitamin D 250-3.125 MG-MCG TABS Take 1 Tablet (250 mg elemental Ca) by mouth daily.   Past Month    capecitabine (XELODA) 500 MG tablet Take 500 mg by mouth 2 times daily.   Past Month    diphenoxylate-atropine (LOMOTIL) 2.5-0.025 MG tablet Take 1 tablet by mouth 4 times daily as needed for diarrhea.   6/10/2025 Morning    lisinopril (ZESTRIL) 5 MG tablet Take 1 Tablet (5 mg) by mouth daily.*   Past Month    metFORMIN (GLUCOPHAGE) 500 MG tablet Take 500 mg by mouth daily (with dinner).   Past Month    Multiple Vitamins-Iron (MULTIVITAMIN/IRON PO) Take 1 tablet by mouth daily.   Past Month    prochlorperazine (COMPAZINE) 5 MG tablet Take 5-10 mg by mouth every 6 hours as needed for nausea or vomiting.   6/10/2025 Morning          ALLERGIES: Patient has no known allergies.    PHYSICAL  "EXAM:    /57 (BP Location: Left arm)   Pulse 89   Temp 97.5  F (36.4  C) (Oral)   Resp 18   Wt 39.1 kg (86 lb 4.8 oz)   SpO2 99%   BMI 15.29 kg/m      GENERAL: Pleasant, no obvious distress  EYES: No scleral icterus  LUNGS: Clear to auscultation bilaterally  HEART: S1S2, no lower extremity edema  ABDOMEN: Non-distended. Positive bowel sounds. Soft, non-tender, no guarding/rebound/mass  MUSKULOSKELETAL:  Warm and well perfused, moves all extremities well  SKIN: No jaundice  NEUROLOGIC: Alert and oriented  PSYCHIATRIC: Normal affect    LAB DATA:  CMP Results:   Recent Labs   Lab Test 06/11/25  1151 06/11/25  0751 06/11/25  0623 06/11/25  0205 06/10/25  2330 06/10/25  2236 06/10/25  1751   NA  --   --  132*  --  131*  --  127*   POTASSIUM  --   --  2.9*  --  3.3*  --  4.2   CHLORIDE  --   --  102  --  99  --  86*   CO2  --   --  19*  --  19*  --  20*   ANIONGAP  --   --  11  --  13  --  21*   * 121* 141*   < > 131*   < > 215*   BUN  --   --  26.9*  --  33.4*  --  42.6*   CR  --   --  0.83  --  1.00*  --  1.39*   BILITOTAL  --   --  0.3  --   --   --  0.5   ALKPHOS  --   --  85  --   --   --  136   ALT  --   --  7  --   --   --  10   AST  --   --  21  --   --   --  24    < > = values in this interval not displayed.      CBC  Recent Labs   Lab 06/11/25  0623 06/10/25  1751   WBC 8.2 10.3   RBC 3.40* 4.91   HGB 10.2* 14.5   HCT 29.1* 41.1   MCV 86 84   MCH 30.0 29.5   MCHC 35.1 35.3   RDW 15.9* 16.0*   * 789*     INRNo lab results found in last 7 days.   No results found for: \"LIPASE\"    IMAGING:  EXAM: CT ABDOMEN PELVIS W/O CONTRAST  LOCATION: [facility]  DATE: 6/10/2025     INDICATION: Persistent nausea and vomiting post ileostomy.  COMPARISON: CT chest, abdomen and pelvis with IV contrast 03/03/2025.  TECHNIQUE: CT scan of the abdomen and pelvis was performed without IV contrast. Multiplanar reformats were obtained. Dose reduction techniques were used.  CONTRAST: " None.     FINDINGS:   LOWER CHEST: Minor strand of linear atelectasis in the inferior lingula and the right lower lobe. No pleural effusion on either side. Normal cardiac size. No pericardial effusion. No significant change.     HEPATOBILIARY: Small hypodensity in the left hepatic lobe anteriorly measures 0.6 cm (image 43, series 3), unchanged. No calcified gallstones, biliary dilatation or adjacent inflammation.     PANCREAS: Normal.     SPLEEN: Normal.     ADRENAL GLANDS: Normal.     KIDNEYS/BLADDER: No urinary tract calculi. Both kidneys are negative for hydronephrosis or hydroureter. Normal urinary bladder.     BOWEL: Interval right lower quadrant ileostomy. Mild diffuse thickening of the distal ileum (images 101-135, series 4, images 23-41, series 4, images 52-63, series 5), worrisome for ongoing enteritis (infectious or inflammatory). No mechanical   obstruction, perforation or abscess formation. Postoperative changes in the distal rectum. Sigmoid diverticulosis.     LYMPH NODES: No suspicious abdominopelvic adenopathy.     VASCULATURE: Atherosclerotic normal caliber distal abdominal aorta measuring 1.4 x 1.4 cm (image 78, series 3). Normal caliber IVC.     PELVIC ORGANS: Distal colonic diverticulosis. Interval postoperative changes in the distal rectum. Postmenopausal uterus. Atherosclerotic changes. No adenopathy or free fluid.     MUSCULOSKELETAL: Degenerative narrowing of the lumbosacral interspace.                                                                      IMPRESSION:   1.  Interval right lower quadrant ileostomy. Diffuse thickening of the distal ileum, worrisome for ongoing enteritis (infectious or inflammatory). No mechanical obstruction, perforation or abscess formation. Postoperative changes in the distal rectum.   Sigmoid diverticulosis.     2.  Stable small left hepatic lobe hypodensity.       ASSESSMENT:  Nausea, vomiting, loose stools-- This is a 70 year old year old female with history  of hypertension, osteoporosis, rhinitis, hyperlipidemia, seasonal allergies, stage IIIb adenocarcinoma diagnosed February 2025 s/p resection and ileostomy, currently treated with Capecitabine and Oxaliplatin (started May 1st, last cycle completed Thurs 6/5/25) who presented to the ER with symptoms of dehydration, intermittent nausea/vomiting, and liquid ileostomy output.  The patient's gi symptoms seem to coincide with receiving treatment for her rectal cancer raising suspicion for GI toxicity. Negative stool tests for c difficile and enteric pathogens is reassuring. Viral enteritis is certainly possible. CT abdomen and pelvis W/out contrast shows fairly nonspecific thickening of the distal ileum. At this point, recommend trial of a fiber supplement (Citrucel 2/day) with hope that this will add bulk to the patient's stool as well as more regularly scheduled Imodium and/or Lomotil as needed for loose stools. If the patient's symptoms fail to improve with conservative measures/adjusting chemo as outlined in Dr Chacon's note from 5/22/25, [facility] would be more than happy to do ileoscopy to evaluate the distal ileum, if oncology will find this to be helpful.     PLAN:  Electrolyte correction/iv hydration per primary team.  Diet as tolerated.  Antiemetics as needed.   Negative stool tests are reassuring. I suspect that a lot of the patient's GI symptoms are related to her current treatment for rectal cancer. Recommend trial with Citrucel 2/day along with Imodium and/or Lomotil as needed.   If symptoms fail to improve, ileoscopy could be pursued if oncology thinks this would be helpful. At this point, no emergent indication for ileoscopy.  No objection to discharge to home if symptoms continue to improve.   Discussed with primary hospitalist/Dr Carrasco. [facility] will plan to sign off. Call with questions/concerns.     Total time spent on this encounter=50minutes  Discussed with Dr Echevarria                                                Lona Urbina PA-C  Thank you for the opportunity to participate in the care of this patient.   Please feel free to call me with any questions or concerns.  Phone number [phone].

## 2025-06-11 NOTE — CONSULTS
Care Management Initial Consult    General Information  Assessment completed with: Patient, Spouse or significant other, pt and Rahul  Type of CM/SW Visit: Initial Assessment    Primary Care Provider verified and updated as needed: Yes   Readmission within the last 30 days: no previous admission in last 30 days      Reason for Consult: discharge planning  Advance Care Planning: Advance Care Planning Reviewed: other (see comments) (pt states has copy at home. CM recommended she provide a copy to [facility] and her PCP when possible)          Communication Assessment  Patient's communication style: spoken language (English or Bilingual)             Cognitive  Cognitive/Neuro/Behavioral: WDL  Level of Consciousness: alert     Orientation: oriented x 4             Living Environment:   People in home: spouse, child([place]), adult  Rahul and adult son  Current living Arrangements: house      Able to return to prior arrangements: yes       Family/Social Support:  Care provided by: spouse/significant other  Provides care for: no one, unable/limited ability to care for self  Marital Status:   Support system:   Rahul       Description of Support System: Supportive, Involved    Support Assessment: Adequate family and caregiver support    Current Resources:   Patient receiving home care services: No        Community Resources: None  Equipment currently used at home: none  Supplies currently used at home:      Employment/Financial:  Employment Status:          Financial Concerns: none   Referral to Financial Worker: No       Does the patient's insurance plan have a 3 day qualifying hospital stay waiver?  No    Lifestyle & Psychosocial Needs:  Social Drivers of Health     Food Insecurity: Low Risk  (6/10/2025)    Food Insecurity     Within the past 12 months, did you worry that your food would run out before you got money to buy more?: No     Within the past 12 months, did the food you bought just not last and you didn t have money  to get more?: No   Depression: Not at risk (10/24/2024)    [facility]    PHQ-2     PHQ-2 Score: 0   Housing Stability: Low Risk  (6/10/2025)    Housing Stability     Do you have housing? : Yes     Are you worried about losing your housing?: No   Tobacco Use: Low Risk  (6/10/2025)    Patient History     Smoking Tobacco Use: Never     Smokeless Tobacco Use: Never     Passive Exposure: Not on file   Financial Resource Strain: Low Risk  (6/10/2025)    Financial Resource Strain     Within the past 12 months, have you or your family members you live with been unable to get utilities (heat, electricity) when it was really needed?: No   Alcohol Use: Not on file   Transportation Needs: Low Risk  (6/10/2025)    Transportation Needs     Within the past 12 months, has lack of transportation kept you from medical appointments, getting your medicines, non-medical meetings or appointments, work, or from getting things that you need?: No   Physical Activity: Not on file   Interpersonal Safety: Low Risk  (6/10/2025)    Interpersonal Safety     Do you feel physically and emotionally safe where you currently live?: Yes     Within the past 12 months, have you been hit, slapped, kicked or otherwise physically hurt by someone?: No     Within the past 12 months, have you been humiliated or emotionally abused in other ways by your partner or ex-partner?: No   Stress: Not on file   Social Connections: Not on file   Health Literacy: Not on file       Functional Status:  Prior to admission patient needed assistance:   Dependent ADLs:: Independent, Bathing, Dressing, Grooming, Transfers (Receives assistance from her  on days when she feels weak)  Dependent IADLs:: Cleaning, Cooking, Laundry, Shopping, Meal Preparation, Medication Management, Transportation  Assesssment of Functional Status: Not at baseline with ADL Functioning    Mental Health Status:  Mental Health Status: No Current Concerns       Chemical Dependency  Status:  Chemical Dependency Status: No Current Concerns             Values/Beliefs:  Spiritual, Cultural Beliefs, Baptism Practices, Values that affect care:                 Discussed  Partnership in Safe Discharge Planning  document with patient/family: No    Additional Information:  CM met with pt, spouse in room to discuss discharge planning and complete initial assessment. Demographics confirmed and updated on facesheet.     Pt lives in a house with her  and adult son. Pt does not use an assistive device. Pt gets assist with ADLs from her  when she is feeling weakFamily assist pt with managing her IADLs as needed . Pt reports that she hasn't driven since starting chemo, her and her  manage her meds together, and she says she never was much for cooking and they enjoy eating out or will  meals from [facility]. Pt does not have any home or community services. No discharge needs identified for CM support. Family to transport.      Pt shared that she has a wonderful  who takes great care of her and gets her anything she needs.     Pt had planned to leave for a vacation tomorrow to [place]. Vacation on hold at this time.   Pt currently has colostomy and says she was told it is supposed to be temporary.     Provider stated pt was negative for C-Diff    Pt receives primary care at [facility].    Next Steps: CM was in pt's room when provider informed pt she would be able to discharge home.     No needs identified.     CM Signing off.    Faye Horton, RN

## 2025-06-11 NOTE — ED NOTES
[facility] ED Handoff Report    ED Chief Complaint: N/V, high output colostomy, general malaise    ED Diagnosis:  (N17.9) Acute kidney injury  Comment: likely due to dehyrdration  Plan: hydration, reassess    (R11.2) Nausea and vomiting, unspecified vomiting type  Comment:   Plan: zofran, IV fluids, assessement    (R19.8,  Z93.2) Increased ileostomy output (H)  Comment: likely due to resection and to much fluids and food at one time vs sipping and controlling rate of fluids  Plan: Possible immodium everytime she eats, to reassess and consult with MD       PMH:    Past Medical History:   Diagnosis Date    Dysplastic nevus     Gross hematuria     Hypertension     Intractable migraine without aura and with status migrainosus     Mixed hyperlipidemia     Osteoporosis     Rectal adenocarcinoma (H)     Seasonal allergic rhinitis         Code Status:  Prior     Falls Risk: Yes Band: Applied    Current Living Situation/Residence: lives with a significant other     Elimination Status: Continent: Yes     Activity Level: 2 assist - very weak at this time. Took 2 of us to help her from wheelchair and pivot to bed in ER    Patients Preferred Language:  English     Needed: No    Vital Signs:  BP (!) 163/63   Pulse 109   Temp 97.7  F (36.5  C) (Temporal)   Resp 24   SpO2 100%      Cardiac Rhythm: NSR    Pain Score: 0/10    Is the Patient Confused:  No    Last Food or Drink: 06/10/25 at 1800 - sips of water this evening in ER    Focused Assessment:  Patient presents to ER for eval of malaise, dehydration, not feeling well, weaker than normal and increased output of colostomy that was placed on 3/31/35 for stage 1 rectal cancer. Has been receiving IV chemo infusions as well as oral. Today upon examination and labs, she is in JOSEPH, deydration with hyponatremia, and recurrent N/V. Increased output likely due to drinking and eating normally and no time to absorb those nutrients and liquids before moving on past  Pt dressed, up in recliner and transported to Phase 2.    the resection and to bag. Causing dehydration and malnourishment. She has hx of having to come to ER's for infusion of fluids to help which given above said reasons does not last long and needs infusion once more. Admission for hydration and fluids along with care management to follow to setup and establish outpatient infusion process to stay ahead of this situation. She does not have a port. If infusion process started there would be discussion of possible PICC placed for long term use. She is AO4, Assist of 2, very weak, mild distress, PERRLA, SR, denies SOB, lungs clear.     Tests Performed: Done: Labs and Imaging    Treatments Provided:  2 L NS bolus, zofran, pepcid, benadryl    Family Dynamics/Concerns: No    Family Updated On Visitor Policy: Yes    Plan of Care Communicated to Family: Yes    Who Was Updated about Plan of Care: Rahul - spouse    Belongings Checklist Done and Signed by Patient: Yes    Medications sent with patient:     Covid: asymptomatic , NOT tested    Additional Information:     RN: Sergio Calle RN   6/10/2025 7:32 PM

## 2025-06-11 NOTE — PROVIDER NOTIFICATION
Per erin MURRIETA, Dr. Herrera ok to remove enteric isolation - C.Diff and enteric panel negative.

## 2025-06-11 NOTE — CONSULTS
[facility]  WO Nurse Inpatient Assessment     Consulted for: Established Colostomy    Summary: Patient does not require additional teaching and nursing responsible for routine pouch changes if patient unable/unwilling to change. OK to use patient's supplies if available, otherwise please order the following from stores if needs to use hospital supplies.    WO nurse follow-up plan: signing off    Patient History (according to provider note(s):    Assessment & Plan  Kailee Lam is a 70 year old female admitted on 6/10/2025.  Medical history notable for rectal adenocarcinoma s/p ileostomy, hypertension, migraine, hyperlipidemia, osteoporosis, allergic rhinitis.  Presented to emergency department with nausea vomiting.  Found to have JOSEPH, admitted for IV fluid resuscitation.     Assessment:    NA    Treatment Plan:   Recommendations entered for colostomy care.    Orders: Written    RECOMMEND PRIMARY TEAM ORDER: None, at this time  Education provided: Not appropriate today   Discussed plan of care with: updated in chart  Notify WOC if wound(s) deteriorate.  Nursing to notify the Provider(s) and re-consult the WOC Nurse if new skin concern.    DATA:     Current support surface: Standard  Standard gel mattress (Isoflex)  Containment of urine/stool: Continent of bladder and Colostomy pouch  BMI: Body mass index is 15.29 kg/m .   Active diet order: Orders Placed This Encounter      Regular Diet Adult     Output: I/O last 3 completed shifts:  In: -   Out: 250 [Urine:75; Stool:175]     Labs:   Recent Labs   Lab 06/11/25  0623   ALBUMIN 2.5*   HGB 10.2*   WBC 8.2     Pressure injury risk assessment:   Sensory Perception: 4-->no impairment  Moisture: 4-->rarely moist  Activity: 3-->walks occasionally  Mobility: 3-->slightly limited  Nutrition: 2-->probably inadequate  Friction and Shear: 2-->potential problem  Dean Score: 18    Keke Villafana MSN RN CWOCN  Pager no longer is use, please contact  through Vocera   Vocera group: [facility]

## 2025-06-11 NOTE — PLAN OF CARE
"       PRIMARY DIAGNOSIS: \"GENERIC\" NURSING  OUTPATIENT/OBSERVATION GOALS TO BE MET BEFORE DISCHARGE:  ADLs back to baseline: unknown    Activity and level of assistance: x1 assist    Pain status: Pain free.    Return to near baseline physical activity: unknown     Discharge Planner Nurse   Safe discharge environment identified: No  Barriers to discharge: Yes       Entered by: Patricia Moser RN 06/11/2025 4:08 AM     Please review provider order for any additional goals.   Nurse to notify provider when observation goals have been met and patient is ready for discharge.      Goal Outcome Evaluation: Patient came from the ED, questions answered and empathetic listening utilized. Patient A/O, IVF infusing, denies N/V, pain, right lower quadrant ileostomy in place bag changed, pt went down for a CT of ABD/pelvis due to Persistent nausea and vomiting post ileostomy, see chart.   Urine sample and stool sample sent. Strict I/O's, wt completed. Bed alarm for safety. Possible discharge 6/11. ketones 2.14 Skin assessment ref    Patricia Moser RN            "

## 2025-06-12 VITALS
OXYGEN SATURATION: 100 % | HEART RATE: 94 BPM | DIASTOLIC BLOOD PRESSURE: 62 MMHG | TEMPERATURE: 98 F | WEIGHT: 86.3 LBS | SYSTOLIC BLOOD PRESSURE: 133 MMHG | BODY MASS INDEX: 15.29 KG/M2 | RESPIRATION RATE: 16 BRPM

## 2025-06-12 LAB
ANION GAP SERPL CALCULATED.3IONS-SCNC: 12 MMOL/L (ref 7–15)
ANION GAP SERPL CALCULATED.3IONS-SCNC: 15 MMOL/L (ref 7–15)
BUN SERPL-MCNC: 17.8 MG/DL (ref 8–23)
BUN SERPL-MCNC: 19.5 MG/DL (ref 8–23)
CALCIUM SERPL-MCNC: 7.8 MG/DL (ref 8.8–10.4)
CALCIUM SERPL-MCNC: 8.4 MG/DL (ref 8.8–10.4)
CHLORIDE SERPL-SCNC: 105 MMOL/L (ref 98–107)
CHLORIDE SERPL-SCNC: 109 MMOL/L (ref 98–107)
CREAT SERPL-MCNC: 0.67 MG/DL (ref 0.51–0.95)
CREAT SERPL-MCNC: 0.73 MG/DL (ref 0.51–0.95)
EGFRCR SERPLBLD CKD-EPI 2021: 89 ML/MIN/1.73M2
EGFRCR SERPLBLD CKD-EPI 2021: >90 ML/MIN/1.73M2
GLUCOSE BLDC GLUCOMTR-MCNC: 122 MG/DL (ref 70–99)
GLUCOSE BLDC GLUCOMTR-MCNC: 127 MG/DL (ref 70–99)
GLUCOSE BLDC GLUCOMTR-MCNC: 127 MG/DL (ref 70–99)
GLUCOSE BLDC GLUCOMTR-MCNC: 130 MG/DL (ref 70–99)
GLUCOSE BLDC GLUCOMTR-MCNC: 138 MG/DL (ref 70–99)
GLUCOSE SERPL-MCNC: 133 MG/DL (ref 70–99)
GLUCOSE SERPL-MCNC: 144 MG/DL (ref 70–99)
HCO3 SERPL-SCNC: 14 MMOL/L (ref 22–29)
HCO3 SERPL-SCNC: 18 MMOL/L (ref 22–29)
HGB BLD-MCNC: 10.6 G/DL (ref 11.7–15.7)
MCV RBC AUTO: 87 FL (ref 78–100)
MCV RBC AUTO: 87 FL (ref 78–100)
PHOSPHATE SERPL-MCNC: 2.5 MG/DL (ref 2.5–4.5)
PHOSPHATE SERPL-MCNC: 2.6 MG/DL (ref 2.5–4.5)
PLATELET # BLD AUTO: 568 10E3/UL (ref 150–450)
POTASSIUM SERPL-SCNC: 3.2 MMOL/L (ref 3.4–5.3)
POTASSIUM SERPL-SCNC: 3.6 MMOL/L (ref 3.4–5.3)
POTASSIUM SERPL-SCNC: 4.3 MMOL/L (ref 3.4–5.3)
SODIUM SERPL-SCNC: 135 MMOL/L (ref 135–145)
SODIUM SERPL-SCNC: 138 MMOL/L (ref 135–145)

## 2025-06-12 PROCEDURE — 250N000013 HC RX MED GY IP 250 OP 250 PS 637: Performed by: INTERNAL MEDICINE

## 2025-06-12 PROCEDURE — 258N000003 HC RX IP 258 OP 636: Performed by: INTERNAL MEDICINE

## 2025-06-12 PROCEDURE — 80048 BASIC METABOLIC PNL TOTAL CA: CPT | Performed by: INTERNAL MEDICINE

## 2025-06-12 PROCEDURE — 85018 HEMOGLOBIN: CPT | Performed by: INTERNAL MEDICINE

## 2025-06-12 PROCEDURE — 250N000013 HC RX MED GY IP 250 OP 250 PS 637: Performed by: PHYSICIAN ASSISTANT

## 2025-06-12 PROCEDURE — 120N000001 HC R&B MED SURG/OB

## 2025-06-12 PROCEDURE — 84100 ASSAY OF PHOSPHORUS: CPT | Performed by: INTERNAL MEDICINE

## 2025-06-12 PROCEDURE — 85049 AUTOMATED PLATELET COUNT: CPT | Performed by: INTERNAL MEDICINE

## 2025-06-12 PROCEDURE — 36415 COLL VENOUS BLD VENIPUNCTURE: CPT | Performed by: INTERNAL MEDICINE

## 2025-06-12 PROCEDURE — 84132 ASSAY OF SERUM POTASSIUM: CPT | Performed by: INTERNAL MEDICINE

## 2025-06-12 PROCEDURE — 250N000011 HC RX IP 250 OP 636

## 2025-06-12 PROCEDURE — 250N000013 HC RX MED GY IP 250 OP 250 PS 637

## 2025-06-12 PROCEDURE — 250N000011 HC RX IP 250 OP 636: Performed by: INTERNAL MEDICINE

## 2025-06-12 PROCEDURE — 99233 SBSQ HOSP IP/OBS HIGH 50: CPT | Performed by: INTERNAL MEDICINE

## 2025-06-12 RX ORDER — DIPHENOXYLATE HYDROCHLORIDE AND ATROPINE SULFATE 2.5; .025 MG/1; MG/1
1 TABLET ORAL 4 TIMES DAILY
Status: DISCONTINUED | OUTPATIENT
Start: 2025-06-12 | End: 2025-06-14

## 2025-06-12 RX ORDER — POTASSIUM CHLORIDE 750 MG/1
10 TABLET, EXTENDED RELEASE ORAL ONCE
Status: COMPLETED | OUTPATIENT
Start: 2025-06-12 | End: 2025-06-12

## 2025-06-12 RX ORDER — DIPHENOXYLATE HYDROCHLORIDE AND ATROPINE SULFATE 2.5; .025 MG/1; MG/1
1 TABLET ORAL ONCE
Status: COMPLETED | OUTPATIENT
Start: 2025-06-13 | End: 2025-06-13

## 2025-06-12 RX ORDER — POTASSIUM CHLORIDE 1500 MG/1
20 TABLET, EXTENDED RELEASE ORAL ONCE
Status: COMPLETED | OUTPATIENT
Start: 2025-06-12 | End: 2025-06-12

## 2025-06-12 RX ADMIN — DIPHENOXYLATE HYDROCHLORIDE AND ATROPINE SULFATE 1 TABLET: .025; 2.5 TABLET ORAL at 12:02

## 2025-06-12 RX ADMIN — SODIUM CHLORIDE: 0.9 INJECTION, SOLUTION INTRAVENOUS at 05:27

## 2025-06-12 RX ADMIN — POTASSIUM & SODIUM PHOSPHATES POWDER PACK 280-160-250 MG 1 PACKET: 280-160-250 PACK at 11:59

## 2025-06-12 RX ADMIN — ONDANSETRON HYDROCHLORIDE 4 MG: 4 TABLET, FILM COATED ORAL at 16:24

## 2025-06-12 RX ADMIN — POTASSIUM CHLORIDE 20 MEQ: 1500 TABLET, EXTENDED RELEASE ORAL at 07:50

## 2025-06-12 RX ADMIN — METHYLCELLULOSE 1000 MG: 500 TABLET ORAL at 07:52

## 2025-06-12 RX ADMIN — POTASSIUM & SODIUM PHOSPHATES POWDER PACK 280-160-250 MG 1 PACKET: 280-160-250 PACK at 07:50

## 2025-06-12 RX ADMIN — POTASSIUM CHLORIDE 10 MEQ: 750 TABLET, EXTENDED RELEASE ORAL at 14:27

## 2025-06-12 RX ADMIN — DIPHENOXYLATE HYDROCHLORIDE AND ATROPINE SULFATE 1 TABLET: .025; 2.5 TABLET ORAL at 16:24

## 2025-06-12 RX ADMIN — DIPHENOXYLATE HYDROCHLORIDE AND ATROPINE SULFATE 1 TABLET: .025; 2.5 TABLET ORAL at 21:13

## 2025-06-12 RX ADMIN — ATORVASTATIN CALCIUM 40 MG: 40 TABLET, FILM COATED ORAL at 07:52

## 2025-06-12 RX ADMIN — SODIUM CHLORIDE: 0.9 INJECTION, SOLUTION INTRAVENOUS at 17:50

## 2025-06-12 RX ADMIN — ONDANSETRON HYDROCHLORIDE 4 MG: 4 TABLET, FILM COATED ORAL at 11:59

## 2025-06-12 RX ADMIN — HEPARIN SODIUM 5000 UNITS: 5000 INJECTION, SOLUTION INTRAVENOUS; SUBCUTANEOUS at 05:52

## 2025-06-12 RX ADMIN — ONDANSETRON HYDROCHLORIDE 4 MG: 4 TABLET, FILM COATED ORAL at 07:50

## 2025-06-12 ASSESSMENT — ACTIVITIES OF DAILY LIVING (ADL)
ADLS_ACUITY_SCORE: 41
ADLS_ACUITY_SCORE: 42
ADLS_ACUITY_SCORE: 41

## 2025-06-12 NOTE — CONSULTS
Full assessment completed yesterday; see note.  Care management signed off. Received a consult for Elevated Risk Score. Chart reviewed by CM. CM will continue to monitor progression of care, review team recommendations and provide discharge planning assist as needed.

## 2025-06-12 NOTE — PLAN OF CARE
L PIV infusing mIVf per Mar - ultrasound placed IV by ELVI MONGE. Regular diet - increased liquid output of ileostomy. Patient education provided on sips of water over time verses large quantity of intake less frequently - discussed location of ostomy and changes in absorption with intake. Patient's family present and attentive to patient. Independent in room. Patient able to empty ostomy independently. HAT placed in toilet to monitor output of urine. Denies pain. VSS. BG monitored per MAR and sliding scale.     K recheck - PO replacement x1 - AM recheck scheduled for 6/12/25. Phos recheck - IV replacement x1 - AM recheck scheduled for 6/12/25.     Patient able to call appropriately and call light in reach.     Patient's ostomy supplies in room.       Problem: Acute Kidney Injury/Impairment  Goal: Fluid and Electrolyte Balance  Outcome: Not Progressing     Problem: Adult Inpatient Plan of Care  Goal: Plan of Care Review  Outcome: Progressing  Goal: Optimal Comfort and Wellbeing  Outcome: Progressing  Intervention: Provide Person-Centered Care  Recent Flowsheet Documentation  Taken 6/11/2025 1620 by Mona Alfredo, RN  Trust Relationship/Rapport:   care explained   emotional support provided   empathic listening provided   questions answered   questions encouraged   reassurance provided   thoughts/feelings acknowledged     Problem: Delirium  Goal: Improved Sleep  Outcome: Progressing     Problem: Fluid Volume Deficit  Goal: Fluid Balance  Outcome: Progressing     Problem: Pain Acute  Goal: Optimal Pain Control and Function  Outcome: Progressing  Intervention: Prevent or Manage Pain  Recent Flowsheet Documentation  Taken 6/11/2025 1700 by Mona Alfredo, RN  Medication Review/Management: medications reviewed     Problem: Acute Kidney Injury/Impairment  Goal: Improved Oral Intake  Outcome: Progressing     Problem: Nausea and Vomiting  Goal: Nausea and Vomiting Relief  Outcome: Progressing  Intervention: Prevent and  Manage Nausea and Vomiting  Recent Flowsheet Documentation  Taken 6/11/2025 1620 by Mona Alfredo, RN  Nausea/Vomiting Interventions: (Scheduled antiemetic)   antiemetic   nausea triggers minimized     Problem: Comorbidity Management  Goal: Blood Glucose Levels Within Targeted Range  Outcome: Progressing  Intervention: Monitor and Manage Glycemia  Recent Flowsheet Documentation  Taken 6/11/2025 1700 by Mona Alfredo, RN  Medication Review/Management: medications reviewed

## 2025-06-12 NOTE — PROGRESS NOTES
"CLINICAL NUTRITION SERVICES - ASSESSMENT NOTE    RECOMMENDATIONS FOR MDs/PROVIDERS TO ORDER:    Registered Dietitian Interventions:  None per pt wishes    Future/Additional Recommendations:     REASON FOR ASSESSMENT  MST 2 (reported: 2-13# wt loss [1], decreased appetite [1])    70 year old female admitted on 6/10/2025. Medical history notable for rectal adenocarcinoma s/p ileostomy, hypertension, migraine, hyperlipidemia, osteoporosis, allergic rhinitis. Presented to emergency department with nausea vomiting. Found to have JOSEPH, admitted for IV fluid resuscitation.     INFORMATION OBTAINED  Chart reviewed:   \"Does not wish to speak with nutrition at this time\" /ProgressNote 6/11   Pt requesting female providers only   Past admit, visited pt 4/1/25: she efficiently declined nutrition supplements and education    CURRENT NUTRITION ORDERS  Diet: Reg    CURRENT INTAKE/TOLERANCE  Received 3 meals 6/11 per I/O    LABS  Nutrition-relevant labs: Reviewed    MEDICATIONS  Nutrition-relevant medications: Reviewed    ANTHROPOMETRICS  Height: 0 cm (Data Unavailable)  Admission Weight: 38.9 kg (85 lb 12.1 oz) (06/10/25 2031)   Most Recent Weight: 39.1 kg (86 lb 4.8 oz) (06/11/25 0639)  IBW 52.3kg  BMI: Body mass index is 15.29 kg/m .     Weight History:   06/11/25 : 39.1 kg (86 lb 4.8 oz) (noted StandingScale)  03/31/25 : 45.4 kg (100 lb)    12/05/24 : 50.7 kg (111 lb 12.8 oz) -22.8% in less than 1y (SIG)  05/02/24 : 49.8 kg (109 lb 12.8 oz)  07/20/23 : 52.2 kg (115 lb)  01/16/23 : 52.3 kg (115 lb 3.2 oz)  02/02/21 : 54.4 kg (120 lb)  10/30/20 : 54.4 kg (120 lb)  07/27/20 : 53.5 kg (118 lb)    Dosing Weight: ideal wt    ASSESSED NUTRITION NEEDS  Estimated Energy Needs: 1540 kcals/day ([place] [address] Jeor*1.5)  Justification: Repletion  Estimated Protein Needs: 73 grams protein/day (kg*1.4)  Justification: Repletion  Estimated Fluid Needs: 1500+ mL/day (1 mL/kcal) (+ = compensatory ostomy losses)  Justification: Maintenance    SYSTEM " AND PHYSICAL FINDINGS    GI symptoms: high volume output colostomy  Skin/wounds: Reviewed    MALNUTRITION  % Intake: Unable to assess  % Weight Loss: > 20% in 1 year (severe)   Subcutaneous Fat Loss: Unable to assess  Muscle Loss: Unable to assess  Fluid Accumulation/Edema: None noted  Malnutrition Diagnosis: Unable to determine due to pt does not want RD visit  Malnutrition Present on Admission: Unable to assess    NUTRITION DIAGNOSIS  Underweight related to cancer as evidenced by BMI 15.29 and hx rectal adenocarcinoma s/p ileostomy    INTERVENTIONS  None    GOALS  PO as able     MONITORING/EVALUATION  Progress toward goals will be monitored and evaluated per policy.

## 2025-06-12 NOTE — PLAN OF CARE
Problem: Adult Inpatient Plan of Care  Goal: Optimal Comfort and Wellbeing  Outcome: Progressing  Intervention: Provide Person-Centered Care  Recent Flowsheet Documentation  Taken 6/12/2025 0145 by Yue Guillermo RN  Trust Relationship/Rapport: care explained     Problem: Delirium  Goal: Improved Sleep  Outcome: Progressing     Problem: Pain Acute  Goal: Optimal Pain Control and Function  Outcome: Progressing  Intervention: Prevent or Manage Pain  Recent Flowsheet Documentation  Taken 6/12/2025 0145 by Yue Guillermo RN  Medication Review/Management: medications reviewed     Problem: Nausea and Vomiting  Goal: Nausea and Vomiting Relief  Outcome: Progressing     Problem: Comorbidity Management  Goal: Blood Glucose Levels Within Targeted Range  Outcome: Progressing  Intervention: Monitor and Manage Glycemia  Recent Flowsheet Documentation  Taken 6/12/2025 0145 by Yue Guillermo RN  Medication Review/Management: medications reviewed   Goal Outcome Evaluation:       Pt is A/O x4. Denies pain and discomfort. Ileostomy intact with increase liquid output.  at 2 AM. VSS continue to monitor. Yue Guillermo RN

## 2025-06-12 NOTE — PLAN OF CARE
Goal Outcome Evaluation:  Patient denies pain.  Appetite is good. Regular diet.On scheduled zofran before meals and no complaints of nausea today.  Still having high outputs from colostomy. 2500ml out from 0745 to 1615. On scheduled lomotil now 4 times a day and on citrucel. , 122, and 138. No sliding scale insulin needed.  K and Phos protocols replaced. Rechecks in am.  Urine output minimal. IVF hydration increased from 75 to 125/hr.   Up independently to the BR. Staff still doing a lot of the emptying of ostomy.

## 2025-06-13 LAB
ANION GAP SERPL CALCULATED.3IONS-SCNC: 10 MMOL/L (ref 7–15)
BUN SERPL-MCNC: 14.9 MG/DL (ref 8–23)
CALCIUM SERPL-MCNC: 7.6 MG/DL (ref 8.8–10.4)
CHLORIDE SERPL-SCNC: 106 MMOL/L (ref 98–107)
CREAT SERPL-MCNC: 0.69 MG/DL (ref 0.51–0.95)
EGFRCR SERPLBLD CKD-EPI 2021: >90 ML/MIN/1.73M2
GLUCOSE BLDC GLUCOMTR-MCNC: 100 MG/DL (ref 70–99)
GLUCOSE BLDC GLUCOMTR-MCNC: 123 MG/DL (ref 70–99)
GLUCOSE BLDC GLUCOMTR-MCNC: 139 MG/DL (ref 70–99)
GLUCOSE BLDC GLUCOMTR-MCNC: 89 MG/DL (ref 70–99)
GLUCOSE SERPL-MCNC: 95 MG/DL (ref 70–99)
HCO3 SERPL-SCNC: 18 MMOL/L (ref 22–29)
MCV RBC AUTO: 90 FL (ref 78–100)
PHOSPHATE SERPL-MCNC: 2.2 MG/DL (ref 2.5–4.5)
PLATELET # BLD AUTO: 530 10E3/UL (ref 150–450)
POTASSIUM SERPL-SCNC: 3.2 MMOL/L (ref 3.4–5.3)
POTASSIUM SERPL-SCNC: 3.4 MMOL/L (ref 3.4–5.3)
POTASSIUM SERPL-SCNC: 3.8 MMOL/L (ref 3.4–5.3)
SODIUM SERPL-SCNC: 134 MMOL/L (ref 135–145)

## 2025-06-13 PROCEDURE — 85049 AUTOMATED PLATELET COUNT: CPT | Performed by: INTERNAL MEDICINE

## 2025-06-13 PROCEDURE — 258N000003 HC RX IP 258 OP 636: Performed by: INTERNAL MEDICINE

## 2025-06-13 PROCEDURE — 84100 ASSAY OF PHOSPHORUS: CPT | Performed by: INTERNAL MEDICINE

## 2025-06-13 PROCEDURE — 84132 ASSAY OF SERUM POTASSIUM: CPT | Performed by: STUDENT IN AN ORGANIZED HEALTH CARE EDUCATION/TRAINING PROGRAM

## 2025-06-13 PROCEDURE — 120N000001 HC R&B MED SURG/OB

## 2025-06-13 PROCEDURE — 250N000013 HC RX MED GY IP 250 OP 250 PS 637

## 2025-06-13 PROCEDURE — 36415 COLL VENOUS BLD VENIPUNCTURE: CPT | Performed by: INTERNAL MEDICINE

## 2025-06-13 PROCEDURE — 250N000011 HC RX IP 250 OP 636: Performed by: INTERNAL MEDICINE

## 2025-06-13 PROCEDURE — 99233 SBSQ HOSP IP/OBS HIGH 50: CPT | Performed by: INTERNAL MEDICINE

## 2025-06-13 PROCEDURE — 36415 COLL VENOUS BLD VENIPUNCTURE: CPT | Performed by: STUDENT IN AN ORGANIZED HEALTH CARE EDUCATION/TRAINING PROGRAM

## 2025-06-13 PROCEDURE — 250N000011 HC RX IP 250 OP 636

## 2025-06-13 PROCEDURE — 99418 PROLNG IP/OBS E/M EA 15 MIN: CPT | Performed by: INTERNAL MEDICINE

## 2025-06-13 PROCEDURE — 250N000013 HC RX MED GY IP 250 OP 250 PS 637: Performed by: INTERNAL MEDICINE

## 2025-06-13 PROCEDURE — 84132 ASSAY OF SERUM POTASSIUM: CPT | Performed by: INTERNAL MEDICINE

## 2025-06-13 PROCEDURE — 80048 BASIC METABOLIC PNL TOTAL CA: CPT | Performed by: INTERNAL MEDICINE

## 2025-06-13 RX ORDER — ONDANSETRON 4 MG/1
4 TABLET, ORALLY DISINTEGRATING ORAL
Status: DISCONTINUED | OUTPATIENT
Start: 2025-06-13 | End: 2025-06-19 | Stop reason: HOSPADM

## 2025-06-13 RX ORDER — SODIUM CHLORIDE, SODIUM LACTATE, POTASSIUM CHLORIDE, CALCIUM CHLORIDE 600; 310; 30; 20 MG/100ML; MG/100ML; MG/100ML; MG/100ML
INJECTION, SOLUTION INTRAVENOUS CONTINUOUS
Status: DISCONTINUED | OUTPATIENT
Start: 2025-06-13 | End: 2025-06-19 | Stop reason: HOSPADM

## 2025-06-13 RX ORDER — POTASSIUM CHLORIDE 750 MG/1
20 TABLET, EXTENDED RELEASE ORAL ONCE
Status: COMPLETED | OUTPATIENT
Start: 2025-06-13 | End: 2025-06-13

## 2025-06-13 RX ORDER — POTASSIUM CHLORIDE 1500 MG/1
20 TABLET, EXTENDED RELEASE ORAL ONCE
Status: COMPLETED | OUTPATIENT
Start: 2025-06-13 | End: 2025-06-13

## 2025-06-13 RX ADMIN — SODIUM CHLORIDE, SODIUM LACTATE, POTASSIUM CHLORIDE, AND CALCIUM CHLORIDE: .6; .31; .03; .02 INJECTION, SOLUTION INTRAVENOUS at 17:58

## 2025-06-13 RX ADMIN — ONDANSETRON 4 MG: 4 TABLET, ORALLY DISINTEGRATING ORAL at 17:46

## 2025-06-13 RX ADMIN — DIPHENOXYLATE HYDROCHLORIDE AND ATROPINE SULFATE 1 TABLET: .025; 2.5 TABLET ORAL at 22:21

## 2025-06-13 RX ADMIN — DIPHENOXYLATE HYDROCHLORIDE AND ATROPINE SULFATE 1 TABLET: .025; 2.5 TABLET ORAL at 00:49

## 2025-06-13 RX ADMIN — SODIUM CHLORIDE: 0.9 INJECTION, SOLUTION INTRAVENOUS at 00:52

## 2025-06-13 RX ADMIN — DIPHENOXYLATE HYDROCHLORIDE AND ATROPINE SULFATE 1 TABLET: .025; 2.5 TABLET ORAL at 17:46

## 2025-06-13 RX ADMIN — ATORVASTATIN CALCIUM 40 MG: 40 TABLET, FILM COATED ORAL at 08:35

## 2025-06-13 RX ADMIN — POTASSIUM CHLORIDE 20 MEQ: 750 TABLET, EXTENDED RELEASE ORAL at 17:57

## 2025-06-13 RX ADMIN — SODIUM CHLORIDE: 0.9 INJECTION, SOLUTION INTRAVENOUS at 09:03

## 2025-06-13 RX ADMIN — METHYLCELLULOSE 1000 MG: 500 TABLET ORAL at 22:21

## 2025-06-13 RX ADMIN — POTASSIUM & SODIUM PHOSPHATES POWDER PACK 280-160-250 MG 1 PACKET: 280-160-250 PACK at 14:16

## 2025-06-13 RX ADMIN — PROCHLORPERAZINE MALEATE 5 MG: 5 TABLET ORAL at 14:14

## 2025-06-13 RX ADMIN — DIPHENOXYLATE HYDROCHLORIDE AND ATROPINE SULFATE 1 TABLET: .025; 2.5 TABLET ORAL at 14:15

## 2025-06-13 RX ADMIN — HEPARIN SODIUM 5000 UNITS: 5000 INJECTION, SOLUTION INTRAVENOUS; SUBCUTANEOUS at 22:22

## 2025-06-13 RX ADMIN — POTASSIUM & SODIUM PHOSPHATES POWDER PACK 280-160-250 MG 1 PACKET: 280-160-250 PACK at 17:45

## 2025-06-13 RX ADMIN — METHYLCELLULOSE 1000 MG: 500 TABLET ORAL at 08:35

## 2025-06-13 RX ADMIN — DIPHENOXYLATE HYDROCHLORIDE AND ATROPINE SULFATE 1 TABLET: .025; 2.5 TABLET ORAL at 08:35

## 2025-06-13 RX ADMIN — POTASSIUM CHLORIDE 20 MEQ: 1500 TABLET, EXTENDED RELEASE ORAL at 10:15

## 2025-06-13 RX ADMIN — POTASSIUM & SODIUM PHOSPHATES POWDER PACK 280-160-250 MG 1 PACKET: 280-160-250 PACK at 10:15

## 2025-06-13 ASSESSMENT — ACTIVITIES OF DAILY LIVING (ADL)
ADLS_ACUITY_SCORE: 41
ADLS_ACUITY_SCORE: 44
ADLS_ACUITY_SCORE: 43
ADLS_ACUITY_SCORE: 41
ADLS_ACUITY_SCORE: 41
ADLS_ACUITY_SCORE: 44
ADLS_ACUITY_SCORE: 41
ADLS_ACUITY_SCORE: 44
ADLS_ACUITY_SCORE: 44
ADLS_ACUITY_SCORE: 41
ADLS_ACUITY_SCORE: 44
ADLS_ACUITY_SCORE: 44
ADLS_ACUITY_SCORE: 41
ADLS_ACUITY_SCORE: 44
ADLS_ACUITY_SCORE: 41
ADLS_ACUITY_SCORE: 42
ADLS_ACUITY_SCORE: 41
ADLS_ACUITY_SCORE: 41
ADLS_ACUITY_SCORE: 44

## 2025-06-13 NOTE — PROGRESS NOTES
GI progress note: Patient not seen    Patient seen yesterday, please see consult note dated 6/12/2025.  Reconsulted for elevated ileostomy output.  Discussed with Dr. Carr.  Please follow recommendations as noted per that consult.  GI will sign off.  Please call or repost if needed.

## 2025-06-13 NOTE — PROGRESS NOTES
[facility]    Medicine Progress Note - Hospitalist Service    Date of Admission:  6/10/2025    Assessment & Plan   Kailee Lam is a 70 year old female admitted on 6/10/2025.  Medical history notable for rectal adenocarcinoma s/p ileostomy, recently started chemotherapy, hypertension, migraine, hyperlipidemia, osteoporosis, allergic rhinitis.  Presented to emergency department with nausea vomiting and high ostomy output.  Found to have JOSEPH     #Acute kidney injury; due to GI loss  # Electrolyte imbalance; due to GI loss  #Anion gap metabolic acidosis, suspected starvation ketosis  Presented to the [facility] emergency department 6/7 for decreased oral intake with recent chemotherapy, improved with 2 L of IV fluid resuscitation.  Represented to [facility] emergency department for similar concern nausea/vomiting, general malaise, increased ileostomy output.  Found to have creatinine of 1.39 with BUN of 42.  Noted difficulty with p.o. intake. Sodium 127. Suspect prerenal JOSEPH in setting of decreased oral intake.   JOSEPH is now resolved  Metabolic acidosis improved  Continue to monitor urine output, renal function, electrolyte  Continue to replace electrolytes per protocol  Decrease IV fluid rate      #Nausea, vomiting and high output ileostomy;  #CT imaging with diffuse thickening of distal ileum, worrisome for enteritis;  - Suspect due to chemotherapy  -C. difficile test, enteric bacterial virus panel negative  -Continue scheduled Lomotil 4 times daily  -Increase Citrucel to twice daily  - Will schedule Zofran prior to meals 3 times daily  - Will ask RD to educate regarding ileostomy diet    #Rectal adenocarcinoma s/p ileostomy 3/2025  Follows with [place] [facility].  Noted to have rectal adenocarcinoma Stage IIIB(T4a, N1b, M0) moderately differentiated adenocarcinoma of the rectum.  Currently undergoing chemotherapy of capecitabine and oxaliplatin.    - Follow-up with oncology as previously  scheduled    #Thrombocytosis  Upon admission platelets 789.  Suspect some degree of hemoconcentration.  - Recheck trending down    #Prediabetes  Was on metformin.  Reports oncology has recently had her hold medication.  - Hold PTA metformin  - Insulin sliding scale and hypoglycemia protocol    #Underweight, Suspected severe protein/calorie malnutrition  Current BMI 15.19.  Reports poor p.o. intake.  Does not wish to speak with nutrition at this time.  -Scheduled Zofran for few doses.          Diet: Regular Diet Adult    DVT Prophylaxis: Pneumatic Compression Devices  Mendoza Catheter: Not present  Lines: None     Cardiac Monitoring: None  Code Status: Full Code      Clinically Significant Risk Factors        # Hypokalemia: Lowest K = 3.2 mmol/L in last 2 days, will replace as needed  # Hyponatremia: Lowest Na = 134 mmol/L in last 2 days, will monitor as appropriate  # Hyperchloremia: Highest Cl = 109 mmol/L in last 2 days, will monitor as appropriate          # Hypoalbuminemia: Lowest albumin = 2.5 g/dL at 6/11/2025  6:23 AM, will monitor as appropriate                    # Financial/Environmental Concerns: none         Social Drivers of Health            Disposition Plan     Medically Ready for Discharge: Anticipated Tomorrow             Trudy Carr MD  Hospitalist Service  [facility]  Securely message with [employer] (more info)  Text page via [employer] Paging/Directory   ______________________________________________________________________    Interval History   Patient reports she is feeling much better  Ostomy output is slowing down  Denies having any abdominal pain.  Feels that scheduled Zofran was working better  Does not feel dizzy or lightheaded.  Feels less fatigued    Physical Exam   Vital Signs: Temp: 98.1  F (36.7  C) Temp src: Oral BP: (!) 146/65 Pulse: 105   Resp: 16 SpO2: 100 % O2 Device: None (Room air)    Weight: 75 lbs 4.8 oz    General Appearance: No acute  distress  Respiratory: Easy respirations, lungs clear  Cardiovascular: Regular rate and rhythm.  Normal S1 and S2  GI: Abdomen soft, nontender, ileostomy with watery output  Other: Alert, nonfocal    Medical Decision Making       70 MINUTES SPENT BY ME on the date of service doing chart review, history, exam, documentation & further activities per the note.  MANAGEMENT DISCUSSED with the following over the past 24 hours: Patient, spouse, nursing staff       Data     I have personally reviewed the following data over the past 24 hrs:    N/A  \   N/A   / 530 (H)     134 (L) 106 14.9 /  123 (H)   3.2 (L) 18 (L) 0.69 \       Imaging results reviewed over the past 24 hrs:   No results found for this or any previous visit (from the past 24 hours).

## 2025-06-13 NOTE — PLAN OF CARE
Goal Outcome Evaluation:      Plan of Care Reviewed With: patient    Overall Patient Progress: improvingOverall Patient Progress: improving     Problem: Comorbidity Management  Goal: Blood Glucose Levels Within Targeted Range  6/12/2025 2233 by Patricia Moser RN  Outcome: Progressing  6/12/2025 2203 by Patricia Moser RN  Outcome: Progressing  Intervention: Monitor and Manage Glycemia  Recent Flowsheet Documentation  Taken 6/12/2025 2126 by Patricia Moser RN  Medication Review/Management: medications reviewed     Patient BG at bedtime 130, no coverage given.     Problem: Nausea and Vomiting  Goal: Nausea and Vomiting Relief  6/12/2025 2233 by Patricia Moser RN  Outcome: Progressing  6/12/2025 2203 by Patricia Moser RN  Outcome: Progressing  Intervention: Prevent and Manage Nausea and Vomiting  Recent Flowsheet Documentation  Taken 6/12/2025 2126 by Patricia Moser RN  Nausea/Vomiting Interventions: (is scheduled)   antiemetic   other (see comments)  Environmental Support: calm environment promoted     Patient denies N/V, on scheduled Zofran. Sips of clears provided.    Problem: Acute Kidney Injury/Impairment  Goal: Effective Renal Function  6/12/2025 2233 by Patricia Moser RN  Outcome: Progressing  6/12/2025 2203 by Patricia Moser RN  Outcome: Progressing  Intervention: Monitor and Support Renal Function  Recent Flowsheet Documentation  Taken 6/12/2025 2126 by Patricia Moser RN  Medication Review/Management: medications reviewed     Hat in toilet to monitor urine output. NS IVF infusing at 125mL in L PIV for fluid resuscitation. Offered fluid intake on regular diet and toilet assistance.     Problem: Delirium  Goal: Improved Sleep  6/12/2025 2233 by Patricia Moser RN  Outcome: Progressing  6/12/2025 2203 by Patricia Moser RN  Outcome: Progressing  Intervention: Promote Sleep  Recent Flowsheet Documentation  Taken 6/12/2025 2126 by Patricia Moser RN  Sleep/Rest Enhancement:  awakenings minimized    Cares clustered to promote sleep/wellbeing.     Problem: Pain Acute  Goal: Optimal Pain Control and Function  6/12/2025 2233 by Patricia Moser RN  Outcome: Progressing  6/12/2025 2203 by Patricia Moser RN  Outcome: Progressing  Intervention: Prevent or Manage Pain  Recent Flowsheet Documentation  Taken 6/12/2025 2126 by Patricia Moser, RN  Sensory Stimulation Regulation: care clustered  Sleep/Rest Enhancement: awakenings minimized  Bowel Elimination Promotion:   adequate fluid intake promoted   ambulation promoted   privacy promoted  Medication Review/Management: medications reviewed     Denies Pain     Problem: Adult Inpatient Plan of Care  Goal: Readiness for Transition of Care  6/12/2025 2233 by Patricia Moser RN  Outcome: Progressing  6/12/2025 2203 by Patricia Moser RN  Outcome: Progressing     Patient states they feel like they are improving. Pt feels less tired/fatigued, up to BR ind, ambulated in alatorre, and overall feels more comfortable.  Lomotil given for high stool output in ostomy, patient assisted with emptying ostomy, but does not always let staff know when full. VSS on RA, K and Phos protocols are rechecks in AM. Continue to monitor     Patricia Moser, SALEEM

## 2025-06-13 NOTE — PLAN OF CARE
Problem: Adult Inpatient Plan of Care  Goal: Plan of Care Review  Description: The Plan of Care Review/Shift note should be completed every shift.  The Outcome Evaluation is a brief statement about your assessment that the patient is improving, declining, or no change.  This information will be displayed automatically on your shift  note.  Outcome: Progressing  Flowsheets (Taken 6/13/2025 1305)  Plan of Care Reviewed With: patient  Overall Patient Progress: improving     Problem: Acute Kidney Injury/Impairment  Goal: Fluid and Electrolyte Balance  Outcome: Progressing  Goal: Improved Oral Intake  Outcome: Progressing     Problem: Comorbidity Management  Goal: Blood Glucose Levels Within Targeted Range  Outcome: Progressing  Intervention: Monitor and Manage Glycemia  Recent Flowsheet Documentation  Taken 6/13/2025 0837 by Paola Rae, RN  Medication Review/Management: medications reviewed   Goal Outcome Evaluation:      Plan of Care Reviewed With: patient    Overall Patient Progress: improvingOverall Patient Progress: improving      Pt A/Ox4, denies any pain. Pt has NS at 125ml/hr. Consult with GI due to increase output of Ostomy running liquid yellow. Pt was able to eat some breakfast and now ordered lunch,  at the bedside and helping out. Potassium was replaced and will recheck at 1430, Phosphate was replaced recheck in AM.  1600 DR. ROSALINA Carr, pt was angry saying did not get diarrhea medications on time, she had to request, pt did not request asked the time and I went through the schedule. Pt asked about nausea medications I explained it is not scheduled now but is only when needed and Zofran was removed. The doctor said she did not explain to the patient this morning. Pt only needed it when tried to take Phospurus the smell and taste. I offered dirrent things, she took with orange juice, the other dose took with water. In the morning pt denies nausea till this afternoon, medication given to  prevent nausea. Pt complained to the doctor felt like was mixing 2 things however now is ok. 1830 IV leaking stopped and IV person called because is a hard stick. Pt aware.

## 2025-06-14 LAB
ANION GAP SERPL CALCULATED.3IONS-SCNC: 11 MMOL/L (ref 7–15)
BUN SERPL-MCNC: 12.1 MG/DL (ref 8–23)
CALCIUM SERPL-MCNC: 8.4 MG/DL (ref 8.8–10.4)
CHLORIDE SERPL-SCNC: 110 MMOL/L (ref 98–107)
CREAT SERPL-MCNC: 0.62 MG/DL (ref 0.51–0.95)
EGFRCR SERPLBLD CKD-EPI 2021: >90 ML/MIN/1.73M2
GLUCOSE BLDC GLUCOMTR-MCNC: 133 MG/DL (ref 70–99)
GLUCOSE BLDC GLUCOMTR-MCNC: 154 MG/DL (ref 70–99)
GLUCOSE BLDC GLUCOMTR-MCNC: 154 MG/DL (ref 70–99)
GLUCOSE BLDC GLUCOMTR-MCNC: 97 MG/DL (ref 70–99)
GLUCOSE SERPL-MCNC: 108 MG/DL (ref 70–99)
HCO3 SERPL-SCNC: 16 MMOL/L (ref 22–29)
HOLD SPECIMEN: NORMAL
PHOSPHATE SERPL-MCNC: 3.4 MG/DL (ref 2.5–4.5)
POTASSIUM SERPL-SCNC: 3.5 MMOL/L (ref 3.4–5.3)
SODIUM SERPL-SCNC: 137 MMOL/L (ref 135–145)

## 2025-06-14 PROCEDURE — 250N000011 HC RX IP 250 OP 636: Performed by: INTERNAL MEDICINE

## 2025-06-14 PROCEDURE — 250N000013 HC RX MED GY IP 250 OP 250 PS 637

## 2025-06-14 PROCEDURE — 258N000003 HC RX IP 258 OP 636: Performed by: INTERNAL MEDICINE

## 2025-06-14 PROCEDURE — 80048 BASIC METABOLIC PNL TOTAL CA: CPT | Performed by: INTERNAL MEDICINE

## 2025-06-14 PROCEDURE — 84100 ASSAY OF PHOSPHORUS: CPT | Performed by: INTERNAL MEDICINE

## 2025-06-14 PROCEDURE — 120N000001 HC R&B MED SURG/OB

## 2025-06-14 PROCEDURE — 250N000013 HC RX MED GY IP 250 OP 250 PS 637: Performed by: INTERNAL MEDICINE

## 2025-06-14 PROCEDURE — 36415 COLL VENOUS BLD VENIPUNCTURE: CPT | Performed by: INTERNAL MEDICINE

## 2025-06-14 PROCEDURE — 99233 SBSQ HOSP IP/OBS HIGH 50: CPT | Performed by: INTERNAL MEDICINE

## 2025-06-14 PROCEDURE — 250N000011 HC RX IP 250 OP 636

## 2025-06-14 RX ORDER — POTASSIUM CHLORIDE 20MEQ/15ML
10 LIQUID (ML) ORAL ONCE
Status: COMPLETED | OUTPATIENT
Start: 2025-06-14 | End: 2025-06-14

## 2025-06-14 RX ORDER — DIPHENOXYLATE HYDROCHLORIDE AND ATROPINE SULFATE 2.5; .025 MG/1; MG/1
2 TABLET ORAL 4 TIMES DAILY PRN
Qty: 60 TABLET | Refills: 2 | Status: SHIPPED | OUTPATIENT
Start: 2025-06-14

## 2025-06-14 RX ORDER — ONDANSETRON 4 MG/1
4 TABLET, ORALLY DISINTEGRATING ORAL EVERY 8 HOURS PRN
Qty: 90 TABLET | Refills: 2 | Status: SHIPPED | OUTPATIENT
Start: 2025-06-14

## 2025-06-14 RX ORDER — DIPHENOXYLATE HYDROCHLORIDE AND ATROPINE SULFATE 2.5; .025 MG/1; MG/1
2 TABLET ORAL 4 TIMES DAILY
Status: DISCONTINUED | OUTPATIENT
Start: 2025-06-14 | End: 2025-06-19 | Stop reason: HOSPADM

## 2025-06-14 RX ORDER — DIPHENOXYLATE HYDROCHLORIDE AND ATROPINE SULFATE 2.5; .025 MG/1; MG/1
2 TABLET ORAL 4 TIMES DAILY
Status: DISCONTINUED | OUTPATIENT
Start: 2025-06-14 | End: 2025-06-14

## 2025-06-14 RX ADMIN — INSULIN ASPART 1 UNITS: 100 INJECTION, SOLUTION INTRAVENOUS; SUBCUTANEOUS at 16:37

## 2025-06-14 RX ADMIN — SODIUM CHLORIDE, SODIUM LACTATE, POTASSIUM CHLORIDE, AND CALCIUM CHLORIDE: .6; .31; .03; .02 INJECTION, SOLUTION INTRAVENOUS at 14:58

## 2025-06-14 RX ADMIN — DIPHENOXYLATE HYDROCHLORIDE AND ATROPINE SULFATE 2 TABLET: .025; 2.5 TABLET ORAL at 11:42

## 2025-06-14 RX ADMIN — METHYLCELLULOSE 1000 MG: 500 TABLET ORAL at 09:05

## 2025-06-14 RX ADMIN — ONDANSETRON 4 MG: 4 TABLET, ORALLY DISINTEGRATING ORAL at 07:14

## 2025-06-14 RX ADMIN — METHYLCELLULOSE 1000 MG: 500 TABLET ORAL at 21:33

## 2025-06-14 RX ADMIN — DIPHENOXYLATE HYDROCHLORIDE AND ATROPINE SULFATE 2 TABLET: .025; 2.5 TABLET ORAL at 16:34

## 2025-06-14 RX ADMIN — POTASSIUM CHLORIDE 10 MEQ: 20 SOLUTION ORAL at 09:36

## 2025-06-14 RX ADMIN — HEPARIN SODIUM 5000 UNITS: 5000 INJECTION, SOLUTION INTRAVENOUS; SUBCUTANEOUS at 21:34

## 2025-06-14 RX ADMIN — ATORVASTATIN CALCIUM 40 MG: 40 TABLET, FILM COATED ORAL at 09:05

## 2025-06-14 RX ADMIN — ONDANSETRON 4 MG: 4 TABLET, ORALLY DISINTEGRATING ORAL at 16:33

## 2025-06-14 RX ADMIN — DIPHENOXYLATE HYDROCHLORIDE AND ATROPINE SULFATE 1 TABLET: .025; 2.5 TABLET ORAL at 09:05

## 2025-06-14 RX ADMIN — HEPARIN SODIUM 5000 UNITS: 5000 INJECTION, SOLUTION INTRAVENOUS; SUBCUTANEOUS at 07:14

## 2025-06-14 RX ADMIN — DIPHENOXYLATE HYDROCHLORIDE AND ATROPINE SULFATE 2 TABLET: .025; 2.5 TABLET ORAL at 21:31

## 2025-06-14 RX ADMIN — HEPARIN SODIUM 5000 UNITS: 5000 INJECTION, SOLUTION INTRAVENOUS; SUBCUTANEOUS at 14:19

## 2025-06-14 RX ADMIN — ONDANSETRON 4 MG: 4 TABLET, ORALLY DISINTEGRATING ORAL at 11:36

## 2025-06-14 ASSESSMENT — ACTIVITIES OF DAILY LIVING (ADL)
ADLS_ACUITY_SCORE: 44

## 2025-06-14 NOTE — PLAN OF CARE
Problem: Nausea and Vomiting  Goal: Nausea and Vomiting Relief  Outcome: Progressing     Problem: Comorbidity Management  Goal: Blood Glucose Levels Within Targeted Range  Outcome: Progressing  Intervention: Monitor and Manage Glycemia  Recent Flowsheet Documentation  Taken 6/14/2025 1200 by Johnnie Loaiza RN  Medication Review/Management: medications reviewed       Goal Outcome Evaluation:    A/Ox3. Vss. Denies nausea, has scheduled zofran before meals for nausea trigger. Has good appetite, see I/O flowsheet. Continues to have watery stools in colostomy bag. Hospitalist added extra doses of lomotil to schedule. No pain reported.  at bedside. No questions or concerns at this time. PIV LFA LR infusing @ 50 ml/hr. Daily wt. K, phos protocols.     Johnnie DYE. , RN

## 2025-06-14 NOTE — PLAN OF CARE
Problem: Comorbidity Management  Goal: Blood Glucose Levels Within Targeted Range  Intervention: Monitor and Manage Glycemia  Recent Flowsheet Documentation  Taken 6/13/2025 1936 by Jade Sharp RN  Medication Review/Management: medications reviewed     Problem: Nausea and Vomiting  Goal: Nausea and Vomiting Relief  Outcome: Progressing     Problem: Acute Kidney Injury/Impairment  Goal: Fluid and Electrolyte Balance  Outcome: Progressing  Goal: Improved Oral Intake  Outcome: Progressing  Goal: Effective Renal Function  Outcome: Progressing  Intervention: Monitor and Support Renal Function  Recent Flowsheet Documentation  Taken 6/13/2025 1936 by Jade Sharp RN  Medication Review/Management: medications reviewed   Goal Outcome Evaluation:    Patient is A/O x4. VSS on RA; denies pain; illiostomy in place; phos and k recheck in am; LR @ 50; reg diet, tolerating well.

## 2025-06-14 NOTE — PROGRESS NOTES
Care Management Follow Up    Length of Stay (days): 3    Expected Discharge Date: 06/15/2025     Concerns to be Addressed: discharge planning     Patient plan of care discussed at interdisciplinary rounds: Yes    Anticipated Discharge Disposition: Home              Anticipated Discharge Services: None  Anticipated Discharge DME: None    Patient/family educated on Medicare website which has current facility and service quality ratings: no  Education Provided on the Discharge Plan: Yes  Patient/Family in Agreement with the Plan: yes    Referrals Placed by CM/SW:    Private pay costs discussed: Not applicable    Discussed  Partnership in Safe Discharge Planning  document with patient/family: No     Handoff Completed: No, handoff not indicated or clinically appropriate    Additional Information:  Patient is not ready for discharge today. It appears they are monitoring strict I&Os and adjusting medication and fluid needs    Next Steps: follow for discharge needs    Caty Snell RNCC

## 2025-06-14 NOTE — PROGRESS NOTES
CLINICAL NUTRITION SERVICES - REASSESSMENT NOTE     RECOMMENDATIONS FOR MDs/PROVIDERS TO ORDER:      Registered Dietitian Interventions:  Pedialyte available for hydration.   Add salt to water to help absorb it better.     Future/Additional Recommendations:       INFORMATION OBTAINED  Assessed patient in room.    CURRENT NUTRITION ORDERS  Diet: Regular      CURRENT INTAKE/TOLERANCE  Eating % at meals.      NEW FINDINGS  MD requested RD see patient for diet questions.  Provided patient with written material on nutrition for short bowel.    Met patient and patient's spouse.  Patient working on decreasing ostomy output.      GI symptoms: ileostomy output 4125 ml 6/11, 5800 ml 6/12, 1350 ml 6/13.    Urine output:  350 ml 6/12, 450 ml 6/13.   Skin/wounds: no open areas noted.     Nutrition-relevant labs: Reviewed  Nutrition-relevant medications: Reviewed  Weight: 06/14/25 : 38.8 kg (85 lb 9.6 oz)   04/11/25 98 lb (44.5 kg)   03/07/25 104 lb (47.2 kg)   03/31/25 : 45.4 kg (100 lb)   01/23/25 108 lb 9.6 oz (49.3 kg)   12/05/24 : 50.7 kg (111 lb 12.8 oz) 23.4% loss in 6 months.   05/02/24 : 49.8 kg (109 lb 12.8 oz)     Dosing Weight: ideal wt-52.3 kg     ASSESSED NUTRITION NEEDS  Estimated Energy Needs: 1540 kcals/day ([place] [address] Jeor*1.5)  Justification: Repletion  Estimated Protein Needs: 73 grams protein/day (kg*1.4)  Justification: Repletion  Estimated Fluid Needs: 1500+ mL/day (1 mL/kcal) (+ = compensatory ostomy losses)  Justification: Maintenance    MALNUTRITION  % Intake: </=75% for >/= 1 month (severe)  % Weight Loss: > 10% in 6 months (severe)   Subcutaneous Fat Loss: Orbital: Moderate  Muscle Loss: Clavicles (pectoralis and deltoids): Severe and Shoulders (deltoids): Severe  Fluid Accumulation/Edema: None noted  Malnutrition Diagnosis: Severe malnutrition in the context of chronic illness  Malnutrition Present on Admission: Yes    EVALUATION OF THE PROGRESS TOWARD GOALS   Previous Goals  PO as able    Evaluation: Progressing    NUTRITION DIAGNOSIS  Underweight related to cancer as evidenced by BMI 15.29 and hx rectal adenocarcinoma s/p ileostomy      INTERVENTIONS  Discussed nutrition and hydration with short bowel.  Encouraged patient to add some salt to water that she is drinking to help her absorb the water.  (Salt is lost through the ileostomy so extra salt is needed.)  Also provided Pedialyte for hydration.  Pedialyte is available from the kitchen.  We discussed avoiding concentrated sugar beverages and watering down juice to help with absorption.      We discussed staying hydrated, making good urine, to keep kidneys healthy.          GOALS  Decrease ileostomy output to 1500 ml or less/day    Good urine output 1200 ml or more/day    No wt loss.    Good po intake.     MONITORING/EVALUATION  Progress toward goals will be monitored and evaluated per policy.

## 2025-06-14 NOTE — PROGRESS NOTES
[facility]    Medicine Progress Note - Hospitalist Service    Date of Admission:  6/10/2025    Assessment & Plan   Kailee Lam is a 70 year old female admitted on 6/10/2025.  Medical history notable for rectal adenocarcinoma s/p ileostomy, recently started chemotherapy, hypertension, migraine, hyperlipidemia, osteoporosis, allergic rhinitis.  Presented to emergency department with nausea vomiting and high ostomy output.  Found to have JOSEPH       #Nausea, vomiting and high output ileostomy;  #CT imaging with diffuse thickening of distal ileum, worrisome for enteritis;  - Suspect due to chemotherapy  -C. difficile test, enteric bacterial virus panel negative  -Appreciate Dr. Ramos and Dr. Louis recommendations  - Scheduled Lomotil increased to 2 tablets 4 times daily  - Continue Citrucel to twice daily  - Continue scheduled Zofran prior to meals 3 times daily  - Discussed with RD and provided printed material and talk to the patient regarding ileostomy diet  - Continue to monitor and record ostomy output.  Per CRS, goal is less than 1000 mL/day    #Acute kidney injury; due to GI loss  # Electrolyte imbalance; due to GI loss  #Anion gap metabolic acidosis, suspected starvation ketosis  Presented to the [facility] emergency department 6/7 for decreased oral intake with recent chemotherapy, improved with 2 L of IV fluid resuscitation.  Represented to [facility] emergency department for similar concern nausea/vomiting, general malaise, increased ileostomy output.  Found to have creatinine of 1.39 with BUN of 42.  Noted difficulty with p.o. intake. Sodium 127. Suspect prerenal JOSEPH in setting of decreased oral intake.   JOSEPH is now resolved  Metabolic acidosis improved  Continue to monitor urine output, renal function, electrolytes  Continue to replace electrolytes per protocol    #Rectal adenocarcinoma s/p ileostomy 3/2025  Follows with [place] [facility].  Noted to have rectal adenocarcinoma Stage  IIIB(T4a, N1b, M0) moderately differentiated adenocarcinoma of the rectum.  Currently undergoing chemotherapy of capecitabine and oxaliplatin.    - Follow-up with oncology for labs next week    #Thrombocytosis  Upon admission platelets 789.  Suspect some degree of hemoconcentration.  - Recheck trending down    #Prediabetes  Was on metformin.  Reports oncology has recently had her hold medication.  - Hold PTA metformin  - Insulin sliding scale and hypoglycemia protocol    #Underweight, Suspected severe protein/calorie malnutrition  Current BMI 15.19.  Reports poor p.o. intake.  Does not wish to speak with nutrition at this time.  -Scheduled Zofran for few doses.          Diet: Regular Diet Adult    DVT Prophylaxis: Ambulate every shift  Mendoza Catheter: Not present  Lines: None     Cardiac Monitoring: None  Code Status: Full Code      Clinically Significant Risk Factors        # Hypokalemia: Lowest K = 3.2 mmol/L in last 2 days, will replace as needed  # Hyponatremia: Lowest Na = 134 mmol/L in last 2 days, will monitor as appropriate  # Hyperchloremia: Highest Cl = 110 mmol/L in last 2 days, will monitor as appropriate          # Hypoalbuminemia: Lowest albumin = 2.5 g/dL at 6/11/2025  6:23 AM, will monitor as appropriate                 # Severe Malnutrition: based on nutrition assessment and treatment provided per dietitian's recommendations., PRESENT ON ADMISSION   # Financial/Environmental Concerns: none         Social Drivers of Health            Disposition Plan     Medically Ready for Discharge: Anticipated Tomorrow             Trudy Carr MD  Hospitalist Service  [facility]  Securely message with SlideShare (more info)  Text page via [facility] Paging/Directory   ______________________________________________________________________    Interval History   Patient reports that she feels better every day  Ileostomy output 200 mL (self-reported) at 4 AM and 375 mL this morning at 8 AM.  No  abdominal pain or nausea.  Has more solid food intake    Physical Exam   Vital Signs: Temp: 97.5  F (36.4  C) Temp src: Oral BP: (!) 151/70 Pulse: 114   Resp: 20 SpO2: 99 % O2 Device: None (Room air)    Weight: 85 lbs 9.6 oz    General Appearance: No acute distress  Respiratory: Respirations unlabored  CV: Mild tachycardia  GI: Abdomen soft, nondistended, nontender, ileostomy noted  Other: Alert and nonfocal    Medical Decision Making       55 MINUTES SPENT BY ME on the date of service doing chart review, history, exam, documentation & further activities per the note.  MANAGEMENT DISCUSSED with the following over the past 24 hours: Patient, spouse, nursing staff, RD       Data     I have personally reviewed the following data over the past 24 hrs:    N/A  \   N/A   / N/A     137 110 (H) 12.1 /  154 (H)   3.5 16 (L) 0.62 \       Imaging results reviewed over the past 24 hrs:   No results found for this or any previous visit (from the past 24 hours).

## 2025-06-14 NOTE — CONSULTS
[facility]  CONSULT NOTE      HISTORY OF PRESENT ILLNESS:  70F PMHx stage IIIB [id number] invasive moderately differentiated adenocarcinoma, + LVI, + PNI, s/p robotic low anterior resection with diverting loop ileostomy 3/31/25, c/b high stoma outpatient, who has been admitted since 6/10/25 with severe dehydration while on adjuvant Capecitabine/Oxaliplatin, C2 given 5/22/25.     During this hospitalization, she has been started on Lomotil 1 tablet QID, and Citrucel. She was seen this morning by Dr. Ramos of [facility], who is recommending uptitration of her Lomotil to 2 tablets QID, IVF to match 1:2 replacement ratio per shift, with plan for < 1L output per day.    Pt seen at bedside. She is doing better. Ostomy output has improved. She denies any blood in the stool. She ate a good breakfast. She has no acute concerns but wonders about modifications in therapy with further adjuvant cycles.    She had no difficulty tolerating C1, but immediately after initiation of C2 on 5/22/25 she developed diarrhea. She completed the two weeks of Capecitabine with escalating ostomy output and increased weakness. Also reports episodes of SOB after completion of her Oxaliplatin treatment with C2, no wheezing.     ONCOLOGIC HISTORY:  February 20, 2025 screening colonoscopu showed multiple polyps and a 3 to 4 cm mass in the rectum 7 to 8 cm from the anal verge.  Pathology was consistent with adenocarcinoma, moderately differentiated with background villous adenoma and intact mismatch repair enzymes.  There were 2 tubular adenomas removed from transverse colon and 1 tubular adenoma removed from descending colon.  MRI on February 28 showed a semiannual rectal mass, 3.1 cm 8.4 cm above the anal verge no evidence of extramural extension and consistent with MRI stage T1-T2 lesion.  No suspicious lymph nodes  CEA level 1.6.  CT chest abdomen pelvis on March 3 showed some stable lung nodule located in right upper lobe stable liver lesions  unchanged over the last 7 years, renal cyst and thickening of the mid rectal wall spanning over an area of 2.4 cm.  No evidence of metastatic disease.  She underwent robotic low anterior resection with diverting loop ileostomy and rigid proctoscopy on 3/31/2025.  Final pathology showed invasive moderately differentiated adenocarcinoma, 2.4 cm straddling the anterior peritoneal reflection invading visceral peritoneum with lymphovascular and perineural invasion.  Margins were negative.  2 out of 15 remove lymph node showed metastatic carcinoma.  This was consistent with stage IIIb (T4a, N1b, M0).  May 1, 2025: Adjuvant chemotherapy with Xeloda and oxaliplatin initiated    Past Medical History:   Diagnosis Date    Dysplastic nevus     Gross hematuria     Hypertension     Intractable migraine without aura and with status migrainosus     Mixed hyperlipidemia     Osteoporosis     Rectal adenocarcinoma (H)     Seasonal allergic rhinitis      Past Surgical History:   Procedure Laterality Date    COLECTOMY, LOW ANTERIOR, ROBOT-ASSISTED, LAPAROSCOPIC, USING DA SHAUN XI N/A 3/31/2025    Procedure: ROBOTIC LOW ANTERIOR RESECTION, WITH DIVERTERTING LOOP ILEOSTOMY;  Surgeon: Ekaterina Ramos MD;  Location: [facility] OR     History reviewed. No pertinent family history.    Social History     Socioeconomic History    Marital status:      Spouse name: None    Number of children: None    Years of education: None    Highest education level: None   Tobacco Use    Smoking status: Never    Smokeless tobacco: Never   Substance and Sexual Activity    Alcohol use: Not Currently     Social Drivers of Health     Financial Resource Strain: Low Risk  (6/10/2025)    Financial Resource Strain     Within the past 12 months, have you or your family members you live with been unable to get utilities (heat, electricity) when it was really needed?: No   Food Insecurity: Low Risk  (6/10/2025)    Food Insecurity     Within the past 12  "months, did you worry that your food would run out before you got money to buy more?: No     Within the past 12 months, did the food you bought just not last and you didn t have money to get more?: No   Transportation Needs: Low Risk  (6/10/2025)    Transportation Needs     Within the past 12 months, has lack of transportation kept you from medical appointments, getting your medicines, non-medical meetings or appointments, work, or from getting things that you need?: No   Interpersonal Safety: Low Risk  (6/10/2025)    Interpersonal Safety     Do you feel physically and emotionally safe where you currently live?: Yes     Within the past 12 months, have you been hit, slapped, kicked or otherwise physically hurt by someone?: No     Within the past 12 months, have you been humiliated or emotionally abused in other ways by your partner or ex-partner?: No   Housing Stability: Low Risk  (6/10/2025)    Housing Stability     Do you have housing? : Yes     Are you worried about losing your housing?: No     /56 (BP Location: Right arm)   Pulse 104   Temp 98.4  F (36.9  C) (Oral)   Resp 16   Wt 38.8 kg (85 lb 9.6 oz)   SpO2 99%   BMI 15.16 kg/m      GEN: No distress  HEENT: anicteric  RESP: CTAB/L, no wheezes, rales rhonchi  CARDIO: RRR, S1 and S2, no murmurs  ABDOMEN: soft, nontender, + BS  EXTREMITIES: warm, well perfused    CBC RESULTS:   Recent Labs     Lab Results   Component Value Date    WBC 8.2 06/11/2025     Lab Results   Component Value Date    RBC 3.40 06/11/2025     Lab Results   Component Value Date    HGB 10.6 06/12/2025     Lab Results   Component Value Date    HCT 29.1 06/11/2025     No components found for: \"MCT\"  Lab Results   Component Value Date    MCV 90 06/13/2025     Lab Results   Component Value Date    MCH 30.0 06/11/2025     Lab Results   Component Value Date    MCHC 35.1 06/11/2025     Lab Results   Component Value Date    RDW 15.9 06/11/2025     Lab Results   Component Value Date    PLT " 530 06/13/2025    Last Comprehensive Metabolic Panel:  Sodium   Date Value Ref Range Status   06/14/2025 137 135 - 145 mmol/L Final     Potassium   Date Value Ref Range Status   06/14/2025 3.5 3.4 - 5.3 mmol/L Final     Chloride   Date Value Ref Range Status   06/14/2025 110 (H) 98 - 107 mmol/L Final     Carbon Dioxide (CO2)   Date Value Ref Range Status   06/14/2025 16 (L) 22 - 29 mmol/L Final     Anion Gap   Date Value Ref Range Status   06/14/2025 11 7 - 15 mmol/L Final     Glucose   Date Value Ref Range Status   06/14/2025 108 (H) 70 - 99 mg/dL Final     GLUCOSE BY METER POCT   Date Value Ref Range Status   06/14/2025 97 70 - 99 mg/dL Final     Urea Nitrogen   Date Value Ref Range Status   06/14/2025 12.1 8.0 - 23.0 mg/dL Final     Creatinine   Date Value Ref Range Status   06/14/2025 0.62 0.51 - 0.95 mg/dL Final     GFR Estimate   Date Value Ref Range Status   06/14/2025 >90 >60 mL/min/1.73m2 Final     Comment:     eGFR calculated using 2021 CKD-EPI equation.     Calcium   Date Value Ref Range Status   06/14/2025 8.4 (L) 8.8 - 10.4 mg/dL Final     Bilirubin Total   Date Value Ref Range Status   06/11/2025 0.3 <=1.2 mg/dL Final     Alkaline Phosphatase   Date Value Ref Range Status   06/11/2025 85 40 - 150 U/L Final     ALT   Date Value Ref Range Status   06/11/2025 7 0 - 50 U/L Final     AST   Date Value Ref Range Status   06/11/2025 21 0 - 45 U/L Final     ASSESSMENT/PLAN:   70F PMHx stage IIIB [id number] invasive moderately differentiated adenocarcinoma, + LVI, + PNI, s/p robotic low anterior resection with diverting loop ileostomy 3/31/25, c/b high stoma output, who has been admitted since 6/10/25 with severe dehydration while on adjuvant Capecitabine/Oxaliplatin, which started after initiation of C2 on 5/22/25.     Rectal cancer: Stage IIIB.  - With severe increase in ostomy output following initiation of C2 CAPOX on 5/22/25 leading to dehydration, weakness.  - Will send dihydropyrimidine dehydrogenase (DPD)  enzyme level to assess for deficiency.  - I will reach out to Dr. Chacon to discuss decrease in Capecitabine dose v. Transition to FOLFOX, the latter of which would require PORT placement in the hospital.  - Chemotherapy currently on HOLD.  - [facility].    High Ostomy output:  - Continue Lomotil 2 tablets QID, fiber, IVF.  - Management per CRS.    Remainder of care per CRS and primary team.    Please call with questions.    Kumar Louis MD  [facility]  [phone] (cell)

## 2025-06-14 NOTE — CONSULTS
[facility]   Consultation      Place of Service: [facility] [place]  Reason for Consultation: high ileostomy output       History of Present Illness:   Kailee is a 70 year old female, well known to me, with rectal cancer s/p robotic LAR with DLI, on chemotherapy admitted with dehydration and high stoma output. We were not consulted on admission, GI had been notified but our service had not. However, I saw her  in the hallway and we will now help with her care and mgmt of her stoma output. She is currently on chemotherapy. She did fine with the first cycle, but had her second cycle and since then has been struggling with nausea and high stoma output. Admitted on 6/10. Was started on some lomotil, had a couple days of 1 tab QID and also citrucel BID. Day before yesterday with 5L output. Yesterday 850 recorded but additional occurrence with no amt recorded. Today has had 375 out so far. CT on admission with some mild non specific thickening at stoma.   Currently she states she feels well. Denies any abdominal pain n/v.         Pertinent Labs:   Lab Results: personally reviewed   Lab Results   Component Value Date     06/14/2025     06/13/2025     06/12/2025    CO2 16 06/14/2025    CO2 18 06/13/2025    CO2 14 06/12/2025    BUN 12.1 06/14/2025    BUN 14.9 06/13/2025    BUN 17.8 06/12/2025     Lab Results   Component Value Date    WBC 8.2 06/11/2025    WBC 10.3 06/10/2025    WBC 11.8 04/01/2025    HGB 10.6 06/12/2025    HGB 10.2 06/11/2025    HGB 14.5 06/10/2025    HCT 29.1 06/11/2025    HCT 41.1 06/10/2025    HCT 31.6 04/01/2025    MCV 90 06/13/2025    MCV 87 06/12/2025    MCV 87 06/12/2025     06/13/2025     06/12/2025     06/11/2025           Past Medical History:   Diagnosis Date    Dysplastic nevus     Gross hematuria     Hypertension     Intractable migraine without aura and with status migrainosus     Mixed hyperlipidemia     Osteoporosis      Rectal adenocarcinoma (H)     Seasonal allergic rhinitis        Past Surgical History:   Procedure Laterality Date    COLECTOMY, LOW ANTERIOR, ROBOT-ASSISTED, LAPAROSCOPIC, USING DA SHAUN XI N/A 3/31/2025    Procedure: ROBOTIC LOW ANTERIOR RESECTION, WITH DIVERTERTING LOOP ILEOSTOMY;  Surgeon: Ekaterina Ramos MD;  Location: [facility] OR       History reviewed. No pertinent family history.    Social History     Socioeconomic History    Marital status:      Spouse name: Not on file    Number of children: Not on file    Years of education: Not on file    Highest education level: Not on file   Occupational History    Not on file   Tobacco Use    Smoking status: Never    Smokeless tobacco: Never   Vaping Use    Vaping status: Not on file   Substance and Sexual Activity    Alcohol use: Not Currently    Drug use: Not on file    Sexual activity: Not on file   Other Topics Concern    Not on file   Social History Narrative    Not on file     Social Drivers of Health     Financial Resource Strain: Low Risk  (6/10/2025)    Financial Resource Strain     Within the past 12 months, have you or your family members you live with been unable to get utilities (heat, electricity) when it was really needed?: No   Food Insecurity: Low Risk  (6/10/2025)    Food Insecurity     Within the past 12 months, did you worry that your food would run out before you got money to buy more?: No     Within the past 12 months, did the food you bought just not last and you didn t have money to get more?: No   Transportation Needs: Low Risk  (6/10/2025)    Transportation Needs     Within the past 12 months, has lack of transportation kept you from medical appointments, getting your medicines, non-medical meetings or appointments, work, or from getting things that you need?: No   Physical Activity: Not on file   Stress: Not on file   Social Connections: Not on file   Interpersonal Safety: Low Risk  (6/10/2025)    Interpersonal Safety     Do  you feel physically and emotionally safe where you currently live?: Yes     Within the past 12 months, have you been hit, slapped, kicked or otherwise physically hurt by someone?: No     Within the past 12 months, have you been humiliated or emotionally abused in other ways by your partner or ex-partner?: No   Housing Stability: Low Risk  (6/10/2025)    Housing Stability     Do you have housing? : Yes     Are you worried about losing your housing?: No       Current Facility-Administered Medications   Medication Dose Route Frequency Provider Last Rate Last Admin    acetaminophen (TYLENOL) tablet 650 mg  650 mg Oral Q4H PRN Ramiro Glover PA-C        Or    acetaminophen (TYLENOL) Suppository 650 mg  650 mg Rectal Q4H PRN Ramiro Glover PA-C        atorvastatin (LIPITOR) tablet 40 mg  40 mg Oral Daily Ramiro Glover PA-C   40 mg at 06/14/25 0905    [Held by provider] capecitabine (XELODA) tablet 500 mg  500 mg Oral BID Ramiro Glover PA-C        glucose gel 15-30 g  15-30 g Oral Q15 Min PRN Kalin Baker MD        Or    dextrose 50 % injection 25-50 mL  25-50 mL Intravenous Q15 Min PRN Kalin Baker MD        Or    glucagon injection 1 mg  1 mg Subcutaneous Q15 Min PRN Kalin Baker MD        diphenoxylate-atropine (LOMOTIL) 2.5-0.025 MG per tablet 2 tablet  2 tablet Oral 4x Daily Ekaterina Ramos MD        heparin ANTICOAGULANT injection 5,000 Units  5,000 Units Subcutaneous Q8H Ramiro Glover PA-C   5,000 Units at 06/14/25 0714    insulin aspart (NovoLOG) injection (RAPID ACTING)  1-3 Units Subcutaneous TID AC Kalin Baker MD   1 Units at 06/11/25 1744    insulin aspart (NovoLOG) injection (RAPID ACTING)  1-3 Units Subcutaneous At Bedtime Kalin Baker MD        lactated ringers infusion   Intravenous Continuous BernicebinskaiTrudy amaya MD 50 mL/hr at 06/13/25 1936 Rate Verify at 06/13/25 1936    [Held by provider] lisinopril (ZESTRIL) tablet 5 mg  5 mg Oral Daily Ramiro Glover PA-C         [Held by provider] metFORMIN (GLUCOPHAGE) tablet 500 mg  500 mg Oral Daily with supper Ramiro Glover PA-C        methylcellulose (CITRUCEL) tablet 1,000 mg  1,000 mg Oral BID Trudy Carr MD   1,000 mg at 06/14/25 0905    ondansetron (ZOFRAN ODT) ODT tab 4 mg  4 mg Oral TID AC Trudy Carr MD   4 mg at 06/14/25 0714    prochlorperazine (COMPAZINE) injection 5 mg  5 mg Intravenous Q6H PRN Ramiro Glover PA-C        Or    prochlorperazine (COMPAZINE) tablet 5 mg  5 mg Oral Q6H PRN Ramiro Glover PA-C   5 mg at 06/13/25 1414    senna-docusate (SENOKOT-S/PERICOLACE) 8.6-50 MG per tablet 1 tablet  1 tablet Oral BID PRRamiro Tompkins PA-C        Or    senna-docusate (SENOKOT-S/PERICOLACE) 8.6-50 MG per tablet 2 tablet  2 tablet Oral BID PRN Ramiro Glover PA-C           Intake/Output past 24 hrs:    Intake/Output Summary (Last 24 hours) at 6/14/2025 1035  Last data filed at 6/14/2025 0939  Gross per 24 hour   Intake 4053 ml   Output 1225 ml   Net 2828 ml       Physical Exam:  Temp:  [98.1  F (36.7  C)-98.3  F (36.8  C)] 98.2  F (36.8  C)  Pulse:  [] 98  Resp:  [16] 16  BP: (106-146)/(58-65) 127/58  SpO2:  [97 %-100 %] 98 %  General: NAD, alert, cooperative  Head: normocephalic, without abnormality / atraumatic  Respiratory: non-labored breathing  Abdomen: soft, non-tender, non-distended.  No guarding or rebound. Ileostomy appears healthy  Skin: no rashes or lesions  Musculoskeletal: moves all four extremities equally; no calf edema or tenderness  Psychological: alert and oriented, answers questions appropriately  Neurological: cranial nerves grossly intact    Assessment/Plan: Kailee Lam is a 70 year old female with hx of rectal cancer s/p LAR with DLI on chemotherapy admitted with high stoma output and dehydration secondary to chemotherapy    At this point CRS will help manage her stoma output  Please continue accurate ins/outs of her stoma output  Recommend IVF replacements based  on her stoma output with 1:2 replacements per shift  I have uptitrated her lomotil to 2 tabs QID. We still have other things to try if output is too high. She is quite small so aiming for less than 1 liter a day. Eventually may need to help set up outpatient IVF assistance  I also notified [place] [facility] of her admission. Not urgent for them to see her but so that they are aware and may need further adjustment of her chemotherapy with Dr. Chacon and also can often help outpatient IVF infusions if needed for dehydration  Please alert us if she is admitted as she is still actively under our care.  CRS will continue to follow      Medical Decision Making:   Additional workup / testing ordered: none  Procedure / Surgery recommended: none   Old records reviewed - chart  Medications added / changed: lomotil changes    Total time spent on consultation including review of chart, face to face time and coordination of care 25 minutes    Ekaterina Ramos MD  [facility]  [phone]

## 2025-06-15 LAB
ANION GAP SERPL CALCULATED.3IONS-SCNC: 12 MMOL/L (ref 7–15)
BUN SERPL-MCNC: 12.8 MG/DL (ref 8–23)
CALCIUM SERPL-MCNC: 9 MG/DL (ref 8.8–10.4)
CHLORIDE SERPL-SCNC: 103 MMOL/L (ref 98–107)
CREAT SERPL-MCNC: 0.78 MG/DL (ref 0.51–0.95)
EGFRCR SERPLBLD CKD-EPI 2021: 82 ML/MIN/1.73M2
GLUCOSE BLDC GLUCOMTR-MCNC: 127 MG/DL (ref 70–99)
GLUCOSE BLDC GLUCOMTR-MCNC: 143 MG/DL (ref 70–99)
GLUCOSE BLDC GLUCOMTR-MCNC: 147 MG/DL (ref 70–99)
GLUCOSE BLDC GLUCOMTR-MCNC: 97 MG/DL (ref 70–99)
GLUCOSE SERPL-MCNC: 135 MG/DL (ref 70–99)
HCO3 SERPL-SCNC: 20 MMOL/L (ref 22–29)
HOLD SPECIMEN: NORMAL
LAB ORDER RESULT STATUS: NORMAL
Lab: NORMAL
PERFORMING LABORATORY: NORMAL
PHOSPHATE SERPL-MCNC: 4.1 MG/DL (ref 2.5–4.5)
POTASSIUM SERPL-SCNC: 3.8 MMOL/L (ref 3.4–5.3)
SODIUM SERPL-SCNC: 135 MMOL/L (ref 135–145)
TEST NAME: NORMAL

## 2025-06-15 PROCEDURE — 80048 BASIC METABOLIC PNL TOTAL CA: CPT | Performed by: INTERNAL MEDICINE

## 2025-06-15 PROCEDURE — 258N000003 HC RX IP 258 OP 636: Performed by: STUDENT IN AN ORGANIZED HEALTH CARE EDUCATION/TRAINING PROGRAM

## 2025-06-15 PROCEDURE — 250N000013 HC RX MED GY IP 250 OP 250 PS 637: Performed by: STUDENT IN AN ORGANIZED HEALTH CARE EDUCATION/TRAINING PROGRAM

## 2025-06-15 PROCEDURE — 250N000013 HC RX MED GY IP 250 OP 250 PS 637: Performed by: INTERNAL MEDICINE

## 2025-06-15 PROCEDURE — 84100 ASSAY OF PHOSPHORUS: CPT | Performed by: INTERNAL MEDICINE

## 2025-06-15 PROCEDURE — 250N000013 HC RX MED GY IP 250 OP 250 PS 637

## 2025-06-15 PROCEDURE — 258N000003 HC RX IP 258 OP 636: Performed by: INTERNAL MEDICINE

## 2025-06-15 PROCEDURE — 36415 COLL VENOUS BLD VENIPUNCTURE: CPT | Performed by: INTERNAL MEDICINE

## 2025-06-15 PROCEDURE — 99232 SBSQ HOSP IP/OBS MODERATE 35: CPT | Performed by: INTERNAL MEDICINE

## 2025-06-15 PROCEDURE — 120N000001 HC R&B MED SURG/OB

## 2025-06-15 PROCEDURE — 250N000011 HC RX IP 250 OP 636

## 2025-06-15 PROCEDURE — 250N000011 HC RX IP 250 OP 636: Performed by: INTERNAL MEDICINE

## 2025-06-15 RX ORDER — LOPERAMIDE HYDROCHLORIDE 2 MG/1
2 CAPSULE ORAL 2 TIMES DAILY
Status: DISCONTINUED | OUTPATIENT
Start: 2025-06-15 | End: 2025-06-16

## 2025-06-15 RX ADMIN — SODIUM CHLORIDE, SODIUM LACTATE, POTASSIUM CHLORIDE, AND CALCIUM CHLORIDE 500 ML: .6; .31; .03; .02 INJECTION, SOLUTION INTRAVENOUS at 00:19

## 2025-06-15 RX ADMIN — ONDANSETRON 4 MG: 4 TABLET, ORALLY DISINTEGRATING ORAL at 17:37

## 2025-06-15 RX ADMIN — ONDANSETRON 4 MG: 4 TABLET, ORALLY DISINTEGRATING ORAL at 06:31

## 2025-06-15 RX ADMIN — HEPARIN SODIUM 5000 UNITS: 5000 INJECTION, SOLUTION INTRAVENOUS; SUBCUTANEOUS at 23:01

## 2025-06-15 RX ADMIN — METHYLCELLULOSE 1000 MG: 500 TABLET ORAL at 23:00

## 2025-06-15 RX ADMIN — DIPHENOXYLATE HYDROCHLORIDE AND ATROPINE SULFATE 2 TABLET: .025; 2.5 TABLET ORAL at 17:57

## 2025-06-15 RX ADMIN — HEPARIN SODIUM 5000 UNITS: 5000 INJECTION, SOLUTION INTRAVENOUS; SUBCUTANEOUS at 06:31

## 2025-06-15 RX ADMIN — SODIUM CHLORIDE, SODIUM LACTATE, POTASSIUM CHLORIDE, AND CALCIUM CHLORIDE: .6; .31; .03; .02 INJECTION, SOLUTION INTRAVENOUS at 12:18

## 2025-06-15 RX ADMIN — HEPARIN SODIUM 5000 UNITS: 5000 INJECTION, SOLUTION INTRAVENOUS; SUBCUTANEOUS at 14:49

## 2025-06-15 RX ADMIN — ATORVASTATIN CALCIUM 40 MG: 40 TABLET, FILM COATED ORAL at 08:52

## 2025-06-15 RX ADMIN — DIPHENOXYLATE HYDROCHLORIDE AND ATROPINE SULFATE 2 TABLET: .025; 2.5 TABLET ORAL at 23:00

## 2025-06-15 RX ADMIN — DIPHENOXYLATE HYDROCHLORIDE AND ATROPINE SULFATE 2 TABLET: .025; 2.5 TABLET ORAL at 12:21

## 2025-06-15 RX ADMIN — INSULIN ASPART 1 UNITS: 100 INJECTION, SOLUTION INTRAVENOUS; SUBCUTANEOUS at 17:36

## 2025-06-15 RX ADMIN — METHYLCELLULOSE 1000 MG: 500 TABLET ORAL at 08:52

## 2025-06-15 RX ADMIN — ONDANSETRON 4 MG: 4 TABLET, ORALLY DISINTEGRATING ORAL at 11:30

## 2025-06-15 RX ADMIN — DIPHENOXYLATE HYDROCHLORIDE AND ATROPINE SULFATE 2 TABLET: .025; 2.5 TABLET ORAL at 08:52

## 2025-06-15 RX ADMIN — LOPERAMIDE HYDROCHLORIDE 2 MG: 2 CAPSULE ORAL at 23:00

## 2025-06-15 ASSESSMENT — ACTIVITIES OF DAILY LIVING (ADL)
ADLS_ACUITY_SCORE: 44
ADLS_ACUITY_SCORE: 44
ADLS_ACUITY_SCORE: 40
ADLS_ACUITY_SCORE: 40
ADLS_ACUITY_SCORE: 44
ADLS_ACUITY_SCORE: 40
ADLS_ACUITY_SCORE: 44
ADLS_ACUITY_SCORE: 40
ADLS_ACUITY_SCORE: 44
ADLS_ACUITY_SCORE: 40
ADLS_ACUITY_SCORE: 40
ADLS_ACUITY_SCORE: 44

## 2025-06-15 NOTE — PLAN OF CARE
Problem: Nausea and Vomiting  Goal: Nausea and Vomiting Relief  Outcome: Progressing     Problem: Acute Kidney Injury/Impairment  Goal: Fluid and Electrolyte Balance  Outcome: Progressing  Goal: Effective Renal Function  Outcome: Progressing   Goal Outcome Evaluation:    Patient is A/O x4. VSS on RA; denies pain; low urine OP; strict I/O; more OP from iliostomy, patient empties into graduated cylinder independently; hat in toilet, pt empties and writes amt on white board; regular diet, tolerating well

## 2025-06-15 NOTE — PLAN OF CARE
Problem: Adult Inpatient Plan of Care  Goal: Absence of Hospital-Acquired Illness or Injury  Outcome: Progressing  Intervention: Identify and Manage Fall Risk  Recent Flowsheet Documentation  Taken 6/15/2025 1545 by Nely Saladna RN  Safety Promotion/Fall Prevention:   safety round/check completed   lighting adjusted   nonskid shoes/slippers when out of bed  Intervention: Prevent Skin Injury  Recent Flowsheet Documentation  Taken 6/15/2025 1545 by Nely Saldana RN  Body Position: position changed independently     Problem: Pain Acute  Goal: Optimal Pain Control and Function  Outcome: Progressing  Intervention: Prevent or Manage Pain  Recent Flowsheet Documentation  Taken 6/15/2025 1545 by Nely Saldana RN  Medication Review/Management: medications reviewed     Problem: Acute Kidney Injury/Impairment  Goal: Fluid and Electrolyte Balance  Outcome: Progressing     Goal Outcome Evaluation:    A&Ox4. VSS. Room air. Denies pain, nausea, and shortness of breath. Voiding. Ileostomy water brown green output.

## 2025-06-15 NOTE — PROGRESS NOTES
Care Management Follow Up    Length of Stay (days): 4    Expected Discharge Date: 06/16/2025     Concerns to be Addressed: discharge planning     Patient plan of care discussed at interdisciplinary rounds: Yes    Anticipated Discharge Disposition: Home with spouse     Anticipated Discharge Services: None  Anticipated Discharge DME: None    Patient/family educated on Medicare website which has current facility and service quality ratings: no  Education Provided on the Discharge Plan: Yes  Patient/Family in Agreement with the Plan: yes    Referrals Placed by CM/SW:    Private pay costs discussed: Not applicable    Discussed  Partnership in Safe Discharge Planning  document with patient/family: No     Handoff Completed: No, handoff not indicated or clinically appropriate    Additional Information:  Patient is not ready for discharge. Colorectal surgery following for large ostomy output. Oncology following    Next Steps: follow for discharge needs    Caty Snell RNCC

## 2025-06-15 NOTE — PROGRESS NOTES
[facility]  Daily Progress Note    Subjective  No acute events overnight. Patient denies any pain or nausea. 2.8 L of stool in last 24 hours. Received a 500 ml bolus last evening given high output. Making adequate urine.    Objective  Intake/Output last 24 hrs:    Intake/Output Summary (Last 24 hours) at 6/15/2025 1415  Last data filed at 6/15/2025 1411  Gross per 24 hour   Intake 3689 ml   Output 4000 ml   Net -311 ml     Temp:  [97.5  F (36.4  C)-98.8  F (37.1  C)] 98.8  F (37.1  C)  Pulse:  [] 99  Resp:  [16-20] 16  BP: (102-151)/(55-70) 140/66  SpO2:  [98 %-100 %] 99 %    Physical Exam:  General: awake, alert, in no acute distress  Head: normocephalic, atraumatic  Respiratory: non-labored breathing  Abdomen: soft, nondistended, nontender. Stoma output with liquid consistency.  Musculoskeletal: moves all four extremities equally; no calf edema or tenderness  Psychological: alert and oriented, answers questions appropriately    Pertinent Labs  Lab Results: personally reviewed.  Lab Results   Component Value Date     06/15/2025     06/14/2025     06/13/2025    CO2 20 06/15/2025    CO2 16 06/14/2025    CO2 18 06/13/2025    BUN 12.8 06/15/2025    BUN 12.1 06/14/2025    BUN 14.9 06/13/2025     Lab Results   Component Value Date    WBC 8.2 06/11/2025    WBC 10.3 06/10/2025    WBC 11.8 04/01/2025    HGB 10.6 06/12/2025    HGB 10.2 06/11/2025    HGB 14.5 06/10/2025    HCT 29.1 06/11/2025    HCT 41.1 06/10/2025    HCT 31.6 04/01/2025    MCV 90 06/13/2025    MCV 87 06/12/2025    MCV 87 06/12/2025     06/13/2025     06/12/2025     06/11/2025       Assessment/Plan: This is a 70 year old female with a history of an LAR with DLI on chemotherapy that is presenting with a high ileostomy output.    - Strict Is and Os  - Continue regular diet  - Metamucil BID  - Max lomotil   - Started imodium 2 mg BID  - Continue to monitor output  - Replacement IV fluids as needed based  on output.    Ross Szymanski MD  [facility]  Office: [phone]  6/15/2025 2:17 PM

## 2025-06-15 NOTE — PLAN OF CARE
Problem: Adult Inpatient Plan of Care  Goal: Absence of Hospital-Acquired Illness or Injury  Intervention: Identify and Manage Fall Risk  Recent Flowsheet Documentation  Taken 6/14/2025 1530 by Lizzie Dickey RN  Safety Promotion/Fall Prevention:   activity supervised   clutter free environment maintained   nonskid shoes/slippers when out of bed   safety round/check completed   supervised activity  Intervention: Prevent Skin Injury  Recent Flowsheet Documentation  Taken 6/14/2025 1530 by Lizzie Dickey RN  Body Position: position changed independently  Intervention: Prevent Infection  Recent Flowsheet Documentation  Taken 6/14/2025 1530 by Lizzie Dickey RN  Infection Prevention:   hand hygiene promoted   cohorting utilized     Problem: Delirium  Goal: Improved Behavioral Control  Intervention: Minimize Safety Risk  Recent Flowsheet Documentation  Taken 6/14/2025 1530 by Lizzie Diceky RN  Enhanced Safety Measures: room near unit station     Problem: Pain Acute  Goal: Optimal Pain Control and Function  Intervention: Prevent or Manage Pain  Recent Flowsheet Documentation  Taken 6/14/2025 1530 by Lizzie Dickey RN  Medication Review/Management: medications reviewed     Problem: Acute Kidney Injury/Impairment  Goal: Effective Renal Function  Intervention: Monitor and Support Renal Function  Recent Flowsheet Documentation  Taken 6/14/2025 1530 by Lizzie Dickey RN  Medication Review/Management: medications reviewed     Problem: Comorbidity Management  Goal: Blood Glucose Levels Within Targeted Range  Intervention: Monitor and Manage Glycemia  Recent Flowsheet Documentation  Taken 6/14/2025 1530 by Lizzie Dickey RN  Medication Review/Management: medications reviewed   Goal Outcome Evaluation:         Alert&O, On RA, denies pain at this time, On phos & Potassium protocol recheck AM, On strict I &O, LR infusing at 75 ml/hr, Up with SBA.

## 2025-06-15 NOTE — PLAN OF CARE
Problem: Adult Inpatient Plan of Care  Goal: Plan of Care Review  Flowsheets (Taken 6/15/2025 3526)  Plan of Care Reviewed With:   patient   spouse  Overall Patient Progress: improving     Problem: Adult Inpatient Plan of Care  Goal: Optimal Comfort and Wellbeing  Outcome: Progressing     Problem: Fluid Volume Deficit  Goal: Fluid Balance  Outcome: Progressing     Problem: Pain Acute  Goal: Optimal Pain Control and Function  Outcome: Progressing     Problem: Nausea and Vomiting  Goal: Nausea and Vomiting Relief  Outcome: Progressing     Problem: Comorbidity Management  Goal: Blood Glucose Levels Within Targeted Range  Outcome: Progressing     Goal Outcome Evaluation:      Plan of Care Reviewed With: patient, spouse    Overall Patient Progress: improvingOverall Patient Progress: improving     A/Ox3. Vss. Denies nausea, has scheduled zofran before meals. Has good appetite at breakfast, see I/O flowsheet. Continues to have watery stools with food particles in colostomy bag. MD ordered loperamide to be given at HS. Denies pain.  at bedside. No questions or concerns. PIV LFA LR infusing @ 75 ml/hr. Daily wt. K, phos protocols.      Johnnie Loaiza, RN

## 2025-06-15 NOTE — PROVIDER NOTIFICATION
Date: 6/15/2025  Time: 1549  Provider: Presley Forrester    Notified provider that patients potassium this morning was 3.8 mmol/L. Potassium reference range is 3.4-5.3 mmol/L. Potassium replacement protocol prompts to give a dose of potassium. Asked provider if they wanted to give patient a dose or recheck lab in the morning.    Provider aware. No potassium dose given. Recheck potassium lab in the morning.

## 2025-06-15 NOTE — PROGRESS NOTES
[facility]    Medicine Progress Note - Hospitalist Service    Date of Admission:  6/10/2025    Assessment & Plan   Kailee Lam is a 70 year old female admitted on 6/10/2025.  Medical history notable for rectal adenocarcinoma s/p ileostomy, recently started chemotherapy, hypertension, migraine, hyperlipidemia, osteoporosis, allergic rhinitis.  Presented to emergency department with nausea vomiting and high ostomy output.  Found to have JOSEPH     #Nausea, vomiting and high output ileostomy- Bms decreasing  #CT imaging with diffuse thickening of distal ileum, worrisome for enteritis;  - Suspect due to chemotherapy  -C. difficile test, enteric bacterial virus panel negative  -Appreciate Dr. Ramos and Dr. Louis recommendations  - Scheduled Lomotil increased to 2 tablets 4 times daily  - Continue Citrucel to twice daily  - Continue scheduled Zofran prior to meals 3 times daily  - Discussed with RD and provided printed material and talk to the patient regarding ileostomy diet  - Continue to monitor and record ostomy output.  Per CRS, goal is less than 1000 mL/day    #Acute kidney injury; due to GI loss- resolved  # Electrolyte imbalance; due to GI loss- resolved  #Anion gap metabolic acidosis, suspected starvation ketosis- resolved  Presented to the [facility] emergency department 6/7 for decreased oral intake with recent chemotherapy, improved with 2 L of IV fluid resuscitation.  Represented to [facility] emergency department for similar concern nausea/vomiting, general malaise, increased ileostomy output.  Found to have creatinine of 1.39 with BUN of 42.  Noted difficulty with p.o. intake. Sodium 127. Suspect prerenal JOSEPH in setting of decreased oral intake.   - Continue to monitor urine output, renal function, electrolytes  - Continue to replace electrolytes per protocol    #Rectal adenocarcinoma s/p ileostomy 3/2025  Follows with [place] [facility].  Noted to have rectal adenocarcinoma Stage IIIB(T4a,  N1b, M0) moderately differentiated adenocarcinoma of the rectum.  Currently undergoing chemotherapy of capecitabine and oxaliplatin.    - Follow-up with oncology for labs next week    #Thrombocytosis  Upon admission platelets 789.  Suspect some degree of hemoconcentration.  - Recheck trending down    #Prediabetes  Was on metformin.  Reports oncology has recently had her hold medication.  - Hold PTA metformin  - Insulin sliding scale and hypoglycemia protocol    #Underweight, Suspected severe protein/calorie malnutrition  Current BMI 15.19.  Reports poor p.o. intake.  Does not wish to speak with nutrition at this time.  -Scheduled Zofran for few doses.          Diet: Regular Diet Adult    DVT Prophylaxis: Heparin SQ  Mendoza Catheter: Not present  Lines: None     Cardiac Monitoring: None  Code Status: Full Code      Clinically Significant Risk Factors          # Hyperchloremia: Highest Cl = 110 mmol/L in last 2 days, will monitor as appropriate          # Hypoalbuminemia: Lowest albumin = 2.5 g/dL at 6/11/2025  6:23 AM, will monitor as appropriate                 # Severe Malnutrition: based on nutrition assessment and treatment provided per dietitian's recommendations.    # Financial/Environmental Concerns: none         Social Drivers of Health            Disposition Plan     Medically Ready for Discharge: Anticipated Tomorrow             Anastasiia Andrews MD  Hospitalist Service  [facility]  Securely message with [email or web address] (more info)  Text page via [employer] Paging/Directory   ______________________________________________________________________    Interval History   Pt. Is new to me today. Discussed with prior hospitalist. Doing well.  Having decreased ostomy output and increased urine output.  Continues on Lomotil 4 times daily.  Is documenting her stooling and urine on her white board.    Physical Exam   Vital Signs: Temp: 98.8  F (37.1  C) Temp src: Oral BP: (!) 140/66 Pulse: 99   Resp: 16  SpO2: 99 % O2 Device: None (Room air)    Weight: 85 lbs 9.6 oz    General Appearance: Awake, alert, in no acute distress  Respiratory: CTAB, no wheeze  Cardiovascular: RRR, no murmur noted  GI: soft, nontender, non distended, normal bowel sounds  Skin: no jaundice, no rash    Medical Decision Making       45 MINUTES SPENT BY ME on the date of service doing chart review, history, exam, documentation & further activities per the note.      Data     I have personally reviewed the following data over the past 24 hrs:    N/A  \   N/A   / N/A     135 103 12.8 /  127 (H)   3.8 20 (L) 0.78 \       Imaging results reviewed over the past 24 hrs:   No results found for this or any previous visit (from the past 24 hours).

## 2025-06-16 LAB
ANION GAP SERPL CALCULATED.3IONS-SCNC: 16 MMOL/L (ref 7–15)
BUN SERPL-MCNC: 12.8 MG/DL (ref 8–23)
CALCIUM SERPL-MCNC: 8.8 MG/DL (ref 8.8–10.4)
CHLORIDE SERPL-SCNC: 99 MMOL/L (ref 98–107)
CREAT SERPL-MCNC: 0.73 MG/DL (ref 0.51–0.95)
EGFRCR SERPLBLD CKD-EPI 2021: 89 ML/MIN/1.73M2
EST. AVERAGE GLUCOSE BLD GHB EST-MCNC: 154 MG/DL
GLUCOSE BLDC GLUCOMTR-MCNC: 110 MG/DL (ref 70–99)
GLUCOSE BLDC GLUCOMTR-MCNC: 110 MG/DL (ref 70–99)
GLUCOSE BLDC GLUCOMTR-MCNC: 121 MG/DL (ref 70–99)
GLUCOSE BLDC GLUCOMTR-MCNC: 129 MG/DL (ref 70–99)
GLUCOSE BLDC GLUCOMTR-MCNC: 143 MG/DL (ref 70–99)
GLUCOSE SERPL-MCNC: 111 MG/DL (ref 70–99)
HBA1C MFR BLD: 7 %
HCO3 SERPL-SCNC: 17 MMOL/L (ref 22–29)
HOLD SPECIMEN: NORMAL
MCV RBC AUTO: 87 FL (ref 78–100)
PHOSPHATE SERPL-MCNC: 4.4 MG/DL (ref 2.5–4.5)
PLATELET # BLD AUTO: 564 10E3/UL (ref 150–450)
POTASSIUM SERPL-SCNC: 3 MMOL/L (ref 3.4–5.3)
POTASSIUM SERPL-SCNC: 3.3 MMOL/L (ref 3.4–5.3)
POTASSIUM SERPL-SCNC: 3.5 MMOL/L (ref 3.4–5.3)
SODIUM SERPL-SCNC: 132 MMOL/L (ref 135–145)

## 2025-06-16 PROCEDURE — 36415 COLL VENOUS BLD VENIPUNCTURE: CPT | Performed by: INTERNAL MEDICINE

## 2025-06-16 PROCEDURE — 80048 BASIC METABOLIC PNL TOTAL CA: CPT | Performed by: INTERNAL MEDICINE

## 2025-06-16 PROCEDURE — 258N000003 HC RX IP 258 OP 636: Performed by: INTERNAL MEDICINE

## 2025-06-16 PROCEDURE — 84100 ASSAY OF PHOSPHORUS: CPT | Performed by: HOSPITALIST

## 2025-06-16 PROCEDURE — 250N000013 HC RX MED GY IP 250 OP 250 PS 637: Performed by: INTERNAL MEDICINE

## 2025-06-16 PROCEDURE — 250N000013 HC RX MED GY IP 250 OP 250 PS 637: Performed by: COLON & RECTAL SURGERY

## 2025-06-16 PROCEDURE — 83036 HEMOGLOBIN GLYCOSYLATED A1C: CPT | Performed by: INTERNAL MEDICINE

## 2025-06-16 PROCEDURE — 85049 AUTOMATED PLATELET COUNT: CPT | Performed by: INTERNAL MEDICINE

## 2025-06-16 PROCEDURE — 250N000011 HC RX IP 250 OP 636

## 2025-06-16 PROCEDURE — 99232 SBSQ HOSP IP/OBS MODERATE 35: CPT | Performed by: INTERNAL MEDICINE

## 2025-06-16 PROCEDURE — 250N000013 HC RX MED GY IP 250 OP 250 PS 637

## 2025-06-16 PROCEDURE — 250N000011 HC RX IP 250 OP 636: Performed by: INTERNAL MEDICINE

## 2025-06-16 PROCEDURE — 84132 ASSAY OF SERUM POTASSIUM: CPT | Performed by: INTERNAL MEDICINE

## 2025-06-16 PROCEDURE — 120N000001 HC R&B MED SURG/OB

## 2025-06-16 RX ORDER — LOPERAMIDE HYDROCHLORIDE 2 MG/1
2 CAPSULE ORAL 4 TIMES DAILY
Status: DISCONTINUED | OUTPATIENT
Start: 2025-06-16 | End: 2025-06-17

## 2025-06-16 RX ORDER — POTASSIUM CHLORIDE 1500 MG/1
20 TABLET, EXTENDED RELEASE ORAL ONCE
Status: COMPLETED | OUTPATIENT
Start: 2025-06-16 | End: 2025-06-16

## 2025-06-16 RX ORDER — POTASSIUM CHLORIDE 1500 MG/1
40 TABLET, EXTENDED RELEASE ORAL ONCE
Status: COMPLETED | OUTPATIENT
Start: 2025-06-16 | End: 2025-06-16

## 2025-06-16 RX ORDER — POTASSIUM CHLORIDE 750 MG/1
10 TABLET, EXTENDED RELEASE ORAL ONCE
Status: COMPLETED | OUTPATIENT
Start: 2025-06-16 | End: 2025-06-16

## 2025-06-16 RX ADMIN — ONDANSETRON 4 MG: 4 TABLET, ORALLY DISINTEGRATING ORAL at 11:29

## 2025-06-16 RX ADMIN — ONDANSETRON 4 MG: 4 TABLET, ORALLY DISINTEGRATING ORAL at 06:33

## 2025-06-16 RX ADMIN — HEPARIN SODIUM 5000 UNITS: 5000 INJECTION, SOLUTION INTRAVENOUS; SUBCUTANEOUS at 13:03

## 2025-06-16 RX ADMIN — POTASSIUM CHLORIDE 10 MEQ: 750 TABLET, EXTENDED RELEASE ORAL at 23:47

## 2025-06-16 RX ADMIN — LOPERAMIDE HYDROCHLORIDE 2 MG: 2 CAPSULE ORAL at 16:32

## 2025-06-16 RX ADMIN — HEPARIN SODIUM 5000 UNITS: 5000 INJECTION, SOLUTION INTRAVENOUS; SUBCUTANEOUS at 06:33

## 2025-06-16 RX ADMIN — DIPHENOXYLATE HYDROCHLORIDE AND ATROPINE SULFATE 2 TABLET: .025; 2.5 TABLET ORAL at 08:28

## 2025-06-16 RX ADMIN — HEPARIN SODIUM 5000 UNITS: 5000 INJECTION, SOLUTION INTRAVENOUS; SUBCUTANEOUS at 21:21

## 2025-06-16 RX ADMIN — LOPERAMIDE HYDROCHLORIDE 2 MG: 2 CAPSULE ORAL at 21:21

## 2025-06-16 RX ADMIN — ATORVASTATIN CALCIUM 40 MG: 40 TABLET, FILM COATED ORAL at 08:28

## 2025-06-16 RX ADMIN — DIPHENOXYLATE HYDROCHLORIDE AND ATROPINE SULFATE 2 TABLET: .025; 2.5 TABLET ORAL at 13:02

## 2025-06-16 RX ADMIN — METHYLCELLULOSE 1000 MG: 500 TABLET ORAL at 08:28

## 2025-06-16 RX ADMIN — ONDANSETRON 4 MG: 4 TABLET, ORALLY DISINTEGRATING ORAL at 16:32

## 2025-06-16 RX ADMIN — DIPHENOXYLATE HYDROCHLORIDE AND ATROPINE SULFATE 2 TABLET: .025; 2.5 TABLET ORAL at 21:21

## 2025-06-16 RX ADMIN — POTASSIUM CHLORIDE 20 MEQ: 1500 TABLET, EXTENDED RELEASE ORAL at 16:39

## 2025-06-16 RX ADMIN — DIPHENOXYLATE HYDROCHLORIDE AND ATROPINE SULFATE 2 TABLET: .025; 2.5 TABLET ORAL at 16:31

## 2025-06-16 RX ADMIN — LOPERAMIDE HYDROCHLORIDE 2 MG: 2 CAPSULE ORAL at 08:28

## 2025-06-16 RX ADMIN — LOPERAMIDE HYDROCHLORIDE 2 MG: 2 CAPSULE ORAL at 13:02

## 2025-06-16 RX ADMIN — SODIUM CHLORIDE, SODIUM LACTATE, POTASSIUM CHLORIDE, AND CALCIUM CHLORIDE: .6; .31; .03; .02 INJECTION, SOLUTION INTRAVENOUS at 10:19

## 2025-06-16 RX ADMIN — POTASSIUM CHLORIDE 40 MEQ: 1500 TABLET, EXTENDED RELEASE ORAL at 09:46

## 2025-06-16 RX ADMIN — METHYLCELLULOSE 1000 MG: 500 TABLET ORAL at 21:27

## 2025-06-16 ASSESSMENT — ACTIVITIES OF DAILY LIVING (ADL)
ADLS_ACUITY_SCORE: 43
ADLS_ACUITY_SCORE: 43
ADLS_ACUITY_SCORE: 44
ADLS_ACUITY_SCORE: 44
ADLS_ACUITY_SCORE: 40
ADLS_ACUITY_SCORE: 40
ADLS_ACUITY_SCORE: 44
ADLS_ACUITY_SCORE: 40
ADLS_ACUITY_SCORE: 43
ADLS_ACUITY_SCORE: 44
ADLS_ACUITY_SCORE: 44
ADLS_ACUITY_SCORE: 40
ADLS_ACUITY_SCORE: 43
ADLS_ACUITY_SCORE: 40
ADLS_ACUITY_SCORE: 40
ADLS_ACUITY_SCORE: 44
ADLS_ACUITY_SCORE: 40
ADLS_ACUITY_SCORE: 43

## 2025-06-16 NOTE — PLAN OF CARE
Problem: Adult Inpatient Plan of Care  Goal: Optimal Comfort and Wellbeing  Outcome: Progressing     Problem: Acute Kidney Injury/Impairment  Goal: Improved Oral Intake  Outcome: Progressing     Problem: Comorbidity Management  Goal: Blood Glucose Levels Within Targeted Range  Outcome: Progressing  Intervention: Monitor and Manage Glycemia  Recent Flowsheet Documentation  Taken 6/16/2025 0033 by Rigoberto Vincent RN  Medication Review/Management: medications reviewed   Goal Outcome Evaluation:    A&O x4. Cooperative with cares. Ileostomy draining watery brown/green output. Pt updates output on whiteboard.  Pt is independent in the room. Good oral intake. Regular diet. K and phos protocol. Blood sugar at 0200 was 143. Able to make needs known.

## 2025-06-16 NOTE — PROGRESS NOTES
Colon and Rectal Surgery  Daily Progress Note    Subjective  Patient reports feeling good, denies any current pain. No nausea, occasional belching. No dizziness/lightheaded with movement. Still having mostly liquid stoma output, emptying independently. 2.6L in last 24hrs. AVSS.    Objective  Intake/Output last 24 hrs:    Intake/Output Summary (Last 24 hours) at 6/16/2025 0952  Last data filed at 6/16/2025 0947  Gross per 24 hour   Intake 3814 ml   Output 3300 ml   Net 514 ml     Temp:  [97.9  F (36.6  C)-98.8  F (37.1  C)] (P) 98  F (36.7  C)  Pulse:  [69-99] (P) 98  Resp:  [14-16] (P) 16  BP: (102-140)/(50-66) (P) 126/58  SpO2:  [96 %-99 %] (P) 99 %    Physical Exam:  General: awake, alert, laying in bed, in no acute distress  Head: normocephalic, atraumatic  Respiratory: non-labored breathing  Abdomen: soft, non-tender, non-distended   Stoma: pink and viable, liquid stool with some form near os  Musculoskeletal: moves all four extremities equally  Psychological: alert and oriented, answers questions appropriately    Pertinent Labs  Lab Results: personally reviewed.  Lab Results   Component Value Date     06/16/2025     06/15/2025     06/14/2025    CO2 17 06/16/2025    CO2 20 06/15/2025    CO2 16 06/14/2025    BUN 12.8 06/16/2025    BUN 12.8 06/15/2025    BUN 12.1 06/14/2025     Lab Results   Component Value Date    WBC 8.2 06/11/2025    WBC 10.3 06/10/2025    WBC 11.8 04/01/2025    HGB 10.6 06/12/2025    HGB 10.2 06/11/2025    HGB 14.5 06/10/2025    HCT 29.1 06/11/2025    HCT 41.1 06/10/2025    HCT 31.6 04/01/2025    MCV 87 06/16/2025    MCV 90 06/13/2025    MCV 87 06/12/2025    MCV 87 06/12/2025     06/16/2025     06/13/2025     06/12/2025       Assessment/Plan: This is a 70 year old female with a history of an LAR with DLI on chemotherapy that is presenting with a high ileostomy output.     - Regular diet  - Strict I&Os  - Metamucil BID, max lomotil  - Increase imodium 2mg  QID  - IVF replacements     Discussed with Dr. Richard Figueroa PA-C  [facility]  [phone]..............................main

## 2025-06-16 NOTE — PLAN OF CARE
Goal Outcome Evaluation:  Patient is alert and oriented and able to make needs known.  Denies pain.  Tolerating regular diet with scheduled zofran before meals. , 110 and 121-no sliding scale insulin coverage needed.   Still having loose, liquid stool from ostomy. Taking scheduled lomotil and immodium QID. Total of 1125ml out from 0700 to 1620. Patient is emptying into toilet and documenting on board.  Replaced Potassium protocol x2 today. Next recheck is scheduled for 2130.  On IVF hydration  Encouraged to be up ambulating and in chair but patient prefers the bed. On heparin subcutaneous, discussed DVT risk.

## 2025-06-16 NOTE — PROGRESS NOTES
Progress Note     Primary Oncologist/Hematologist:            Assessment and Plan:New actions/orders today (06/16/2025) are underlined.     70F PMHx stage IIIB [id number] invasive moderately differentiated adenocarcinoma, + LVI, + PNI, s/p robotic low anterior resection with diverting loop ileostomy 3/31/25, c/b high stoma output, who has been admitted since 6/10/25 with severe dehydration due to high ostomy output while on adjuvant Capecitabine/Oxaliplatin, which started after initiation of C2 on 5/22/25.      Rectal cancer: Stage IIIB.  - Admitted with severe increase in ostomy output following initiation of C2 CAPOX on 5/22/25 leading to dehydration, weakness.  - (DPD) enzyme level pending to assess for deficiency.  - Dr. Chacon is aware of current hospitalization: plan to follow outpatient to discuss treatment plan regarding adjustment of Capecitabine dose vs. transition to FOLFOX.  - Chemotherapy currently on HOLD.  - Will arrange outpatient follow-up in [place] later this week.     High Ostomy output:  - Management per CRS.    I was able to discuss risks and benefits of port placement due to patient being difficult to access. We also reviewed what treatment could look like with regards to switching to IV chemo which would require port placement. After review, the patient wishes to hold on of port placement regardless of difficult access and speak with Dr. Chacon regarding treatment going forward prior to deciding.  Orders have been placed to followup outpatient as soon as Wednesday with IV hydration Wed and Fri of this week to continue to support hydration while patient is improving oral intake.     A total of 35 minutes was spent in direct communication along with 15 minutes additional time discussing with nurse, primary oncologist and placing followup orders for a total of 50minutes.     Lori Arredondo  [facility]  Office: [phone]  Cell: [phone] Monday through Friday, 8AM-5PM. After hours  and weekends, please use answering service.          Interval History:     Feels better each day. Output is still loose, but less. She had a hard time with IV placement this morning. Tolerating more food. Has appropriate concern regarding oral capecitabine.               Review of Systems:     The 5 point Review of Systems is negative other than noted in the HPI             Medications:   Scheduled Medications  Current Facility-Administered Medications   Medication Dose Route Frequency Provider Last Rate Last Admin    atorvastatin (LIPITOR) tablet 40 mg  40 mg Oral Daily Ramiro Glover PA-C   40 mg at 06/16/25 0828    [Held by provider] capecitabine (XELODA) tablet 500 mg  500 mg Oral BID Ramiro Glover PA-C        diphenoxylate-atropine (LOMOTIL) 2.5-0.025 MG per tablet 2 tablet  2 tablet Oral 4x Daily Trudy Carr MD   2 tablet at 06/16/25 1302    heparin ANTICOAGULANT injection 5,000 Units  5,000 Units Subcutaneous Q8H Ramiro Glover PA-C   5,000 Units at 06/16/25 1303    insulin aspart (NovoLOG) injection (RAPID ACTING)  1-3 Units Subcutaneous TID  Kalin Baker MD   1 Units at 06/15/25 1736    insulin aspart (NovoLOG) injection (RAPID ACTING)  1-3 Units Subcutaneous At Bedtime Kalin Baker MD        [Held by provider] lisinopril (ZESTRIL) tablet 5 mg  5 mg Oral Daily Ramiro Glover PA-C        loperamide (IMODIUM) capsule 2 mg  2 mg Oral 4x Daily Ekaterina Ramos MD   2 mg at 06/16/25 1302    [Held by provider] metFORMIN (GLUCOPHAGE) tablet 500 mg  500 mg Oral Daily with supper Ramiro Glover PA-C        methylcellulose (CITRUCEL) tablet 1,000 mg  1,000 mg Oral BID Trudy Carr MD   1,000 mg at 06/16/25 0828    ondansetron (ZOFRAN ODT) ODT tab 4 mg  4 mg Oral TID AC Trudy Carr MD   4 mg at 06/16/25 1129     PRN Medications  Current Facility-Administered Medications   Medication Dose Route Frequency Provider Last Rate Last Admin    acetaminophen (TYLENOL) tablet 650  mg  650 mg Oral Q4H PRN Ramiro Glover PA-C        Or    acetaminophen (TYLENOL) Suppository 650 mg  650 mg Rectal Q4H PRN Ramiro Glover PA-C        glucose gel 15-30 g  15-30 g Oral Q15 Min PRKalin Ferrer MD        Or    dextrose 50 % injection 25-50 mL  25-50 mL Intravenous Q15 Min PRKalin Ferrer MD        Or    glucagon injection 1 mg  1 mg Subcutaneous Q15 Min PRKalin Ferrer MD        prochlorperazine (COMPAZINE) injection 5 mg  5 mg Intravenous Q6H PRRamiro Tompkins PA-C        Or    prochlorperazine (COMPAZINE) tablet 5 mg  5 mg Oral Q6H PRRamiro Tompkins PA-C   5 mg at 06/13/25 1414    senna-docusate (SENOKOT-S/PERICOLACE) 8.6-50 MG per tablet 1 tablet  1 tablet Oral BID PRRamiro Tompkins PA-C        Or    senna-docusate (SENOKOT-S/PERICOLACE) 8.6-50 MG per tablet 2 tablet  2 tablet Oral BID Ramiro Zhao PA-C                      Physical Exam:   Vitals were reviewed  Blood pressure 123/60, pulse 95, temperature 98.1  F (36.7  C), temperature source Oral, resp. rate 18, weight 38.2 kg (84 lb 3.2 oz), SpO2 97%.  Wt Readings from Last 4 Encounters:   06/16/25 38.2 kg (84 lb 3.2 oz)   03/31/25 45.4 kg (100 lb)   12/05/24 50.7 kg (111 lb 12.8 oz)   05/02/24 49.8 kg (109 lb 12.8 oz)       I/O last 3 completed shifts:  In: 3994 [P.O.:2810; I.V.:1184]  Out: 3350 [Urine:750; Stool:2600]    Constitutional: Awake, alert, cooperative, no apparent distress   Lungs: Unlabored respiratory effort with conversation.   Abdomen:  soft, non-distended, non-tender   Skin: No rashes, no cyanosis, no edema   Other:               Data:   All laboratory data and imaging studies reviewed.    CMP  Recent Labs   Lab 06/16/25  1128 06/16/25  0812 06/16/25  0624 06/16/25  0201 06/15/25  0748 06/15/25  0702 06/14/25  0731 06/14/25  0555 06/13/25  2223 06/13/25  0743 06/13/25  0656 06/11/25  0751 06/11/25  0623 06/10/25  2236 06/10/25  1751   NA  --   --  132*  --   --  135  --  137  --   --  134*    < > 132*   < > 127*   POTASSIUM  --   --  3.0*  --   --  3.8  --  3.5 3.8   < > 3.4   < > 2.9*   < > 4.2   CHLORIDE  --   --  99  --   --  103  --  110*  --   --  106   < > 102   < > 86*   CO2  --   --  17*  --   --  20*  --  16*  --   --  18*   < > 19*   < > 20*   ANIONGAP  --   --  16*  --   --  12  --  11  --   --  10   < > 11   < > 21*   * 110* 111* 143*   < > 135*   < > 108*  --    < > 95   < > 141*   < > 215*   BUN  --   --  12.8  --   --  12.8  --  12.1  --   --  14.9   < > 26.9*   < > 42.6*   CR  --   --  0.73  --   --  0.78  --  0.62  --   --  0.69   < > 0.83   < > 1.39*   GFRESTIMATED  --   --  89  --   --  82  --  >90  --   --  >90   < > 76   < > 41*   PIETER  --   --  8.8  --   --  9.0  --  8.4*  --   --  7.6*   < > 7.1*   < > 9.6   MAG  --   --   --   --   --   --   --   --   --   --   --   --  1.9  --  2.6*   PHOS  --   --  4.4  --   --  4.1  --  3.4  --   --  2.2*   < > 1.7*  --  4.1   PROTTOTAL  --   --   --   --   --   --   --   --   --   --   --   --  4.7*  --  7.3   ALBUMIN  --   --   --   --   --   --   --   --   --   --   --   --  2.5*  --  3.8   BILITOTAL  --   --   --   --   --   --   --   --   --   --   --   --  0.3  --  0.5   ALKPHOS  --   --   --   --   --   --   --   --   --   --   --   --  85  --  136   AST  --   --   --   --   --   --   --   --   --   --   --   --  21  --  24   ALT  --   --   --   --   --   --   --   --   --   --   --   --  7  --  10    < > = values in this interval not displayed.     CBC  Recent Labs   Lab 06/16/25  0624 06/13/25  0656 06/12/25  0636 06/11/25  0623 06/10/25  1701   WBC  --   --   --  8.2 10.3   RBC  --   --   --  3.40* 4.91   HGB  --   --  10.6* 10.2* 14.5   HCT  --   --   --  29.1* 41.1   MCV 87 90 87  87 86 84   MCH  --   --   --  30.0 29.5   MCHC  --   --   --  35.1 35.3   RDW  --   --   --  15.9* 16.0*   * 530* 568* 533* 789*     INRNo lab results found in last 7 days.  Data   Results for orders placed or performed during the hospital  encounter of 06/10/25 (from the past 24 hours)   Glucose by meter   Result Value Ref Range    GLUCOSE BY METER POCT 143 (H) 70 - 99 mg/dL   Glucose by meter   Result Value Ref Range    GLUCOSE BY METER POCT 147 (H) 70 - 99 mg/dL   Glucose by meter   Result Value Ref Range    GLUCOSE BY METER POCT 143 (H) 70 - 99 mg/dL   Basic metabolic panel   Result Value Ref Range    Sodium 132 (L) 135 - 145 mmol/L    Potassium 3.0 (L) 3.4 - 5.3 mmol/L    Chloride 99 98 - 107 mmol/L    Carbon Dioxide (CO2) 17 (L) 22 - 29 mmol/L    Anion Gap 16 (H) 7 - 15 mmol/L    Urea Nitrogen 12.8 8.0 - 23.0 mg/dL    Creatinine 0.73 0.51 - 0.95 mg/dL    GFR Estimate 89 >60 mL/min/1.73m2    Calcium 8.8 8.8 - 10.4 mg/dL    Glucose 111 (H) 70 - 99 mg/dL   Platelet count   Result Value Ref Range    Platelet Count 564 (H) 150 - 450 10e3/uL    MCV 87 78 - 100 fL   Phosphorus   Result Value Ref Range    Phosphorus 4.4 2.5 - 4.5 mg/dL   Glucose by meter   Result Value Ref Range    GLUCOSE BY METER POCT 110 (H) 70 - 99 mg/dL   Glucose by meter   Result Value Ref Range    GLUCOSE BY METER POCT 110 (H) 70 - 99 mg/dL

## 2025-06-16 NOTE — PROGRESS NOTES
[facility]    Medicine Progress Note - Hospitalist Service    Date of Admission:  6/10/2025    Assessment & Plan   Kailee Lam is a 70 year old female admitted on 6/10/2025.  Medical history notable for rectal adenocarcinoma s/p ileostomy, recently started chemotherapy, hypertension, migraine, hyperlipidemia, osteoporosis, allergic rhinitis.  Presented to emergency department with nausea vomiting and high ostomy output.  Found to have JOSEPH     #Nausea, vomiting and high output ileostomy-quantity decreasing  #CT imaging with diffuse thickening of distal ileum, worrisome for enteritis  - Suspect due to chemotherapy  - C. difficile test, enteric bacterial virus panel negative  - Appreciate Dr. Ramos and Dr. Louis recommendations  - Scheduled Lomotil increased to 2 tablets 4 times daily  - Continue Citrucel to twice daily  - Continue scheduled Zofran prior to meals 3 times daily  - Discussed with RD and provided printed material and talk to the patient regarding ileostomy diet  - Continue to monitor and record ostomy output.  Per CRS, goal is less than 1000 mL/day  - Output for 6/15 was 2.6 L, seems to be decreasing more today  - Continue IVF with LR at 75 mL/h    #Acute kidney injury; due to GI loss- resolved  # Electrolyte imbalance; due to GI loss- resolved  #Anion gap metabolic acidosis, suspected starvation ketosis- resolved  Presented to the [facility] emergency department 6/7 for decreased oral intake with recent chemotherapy, improved with 2 L of IV fluid resuscitation.  Represented to [facility] emergency department for similar concern nausea/vomiting, general malaise, increased ileostomy output.  Found to have creatinine of 1.39 with BUN of 42.  Noted difficulty with p.o. intake. Sodium 127. Suspect prerenal JOSEPH in setting of decreased oral intake.   - Continue to monitor urine output, renal function, electrolytes  - Continue to replace electrolytes per protocol    #Rectal adenocarcinoma s/p  ileostomy 3/2025  Follows with [place] [facility]ology.  Noted to have rectal adenocarcinoma Stage IIIB(T4a, N1b, M0) moderately differentiated adenocarcinoma of the rectum.  Currently undergoing chemotherapy of capecitabine and oxaliplatin.    - Follow-up with oncology for labs next week    #Thrombocytosis  Upon admission platelets 789.  Suspect some degree of hemoconcentration.  - Fluctuating between 530s and 560s    #Prediabetes  Was on metformin.  Reports oncology has recently had her hold medication.  -most recent A1c 10/24/2024 6.3% rechecking  - Hold PTA metformin  - Insulin sliding scale and hypoglycemia protocol    #Underweight, Suspected severe protein/calorie malnutrition  Current BMI 15.19.  Reports poor p.o. intake.  Does not wish to speak with nutrition at this time.  - Compazine as needed for nausea    #Essential hypertension  -On lisinopril 5 mg outpatient, currently held due to JOSEPH and lower blood pressures in the setting of diarrhea  -restarting 6/17        Diet: Regular Diet Adult    DVT Prophylaxis: Pneumatic Compression Devices  Mendoza Catheter: Not present  Lines: None     Cardiac Monitoring: None  Code Status: Full Code      Clinically Significant Risk Factors        # Hypokalemia: Lowest K = 3 mmol/L in last 2 days, will replace as needed  # Hyponatremia: Lowest Na = 132 mmol/L in last 2 days, will monitor as appropriate       # Hypoalbuminemia: Lowest albumin = 2.5 g/dL at 6/11/2025  6:23 AM, will monitor as appropriate                 # Severe Malnutrition: based on nutrition assessment and treatment provided per dietitian's recommendations.    # Financial/Environmental Concerns: none         Social Drivers of Health            Disposition Plan     Medically Ready for Discharge: Anticipated Tomorrow             Anastasiia Andrews MD  Hospitalist Service  [facility]  Securely message with [employer] (more info)  Text page via [facility] Paging/Directory    ______________________________________________________________________    Interval History   Having significantly less stool output today.  So far only 275 mL.  Was at 2.6 L yesterday.  Continues on 2 tablets of Lomotil 4 times a day.  Not having nausea.  No other complaints.   at bedside.    Physical Exam   Vital Signs: Temp: 98.1  F (36.7  C) Temp src: Oral BP: 123/60 Pulse: 95   Resp: 18 SpO2: 97 % O2 Device: None (Room air)    Weight: 84 lbs 3.2 oz    General Appearance: Awake, alert, in no acute distress  Respiratory: CTAB, no wheeze  Cardiovascular: RRR  GI: soft, nontender, non distended, normal bowel sounds  Skin: no jaundice, no rash    Medical Decision Making       30 MINUTES SPENT BY ME on the date of service doing chart review, history, exam, documentation & further activities per the note.      Data     I have personally reviewed the following data over the past 24 hrs:    N/A  \   N/A   / 564 (H)     132 (L) 99 12.8 /  110 (H)   3.0 (L) 17 (L) 0.73 \       Imaging results reviewed over the past 24 hrs:   No results found for this or any previous visit (from the past 24 hours).

## 2025-06-16 NOTE — PROVIDER NOTIFICATION
Date: 6/15/2025  Time: 0054  Provider: [place]    Notified provider that patients PIV leaking and not usable. Asked if patient should continue to have LR 75 ml/hr continuously. Patient drinking adequate fluids orally.    Provider aware. Hold IV fluids for tonight. Readdress fluids with MD/hospitalist in the morning.

## 2025-06-16 NOTE — PROGRESS NOTES
Care Management Follow Up    Length of Stay (days): 5    Expected Discharge Date: 06/17/2025    Anticipated Discharge Plan:  Home    Transportation: Anticipate Family/friend    PT Recommendations:    OT Recommendations:        Barriers to Discharge: medical stability    Prior Living Situation: house with spouse, child([place]), adult    Discussed  Partnership in Safe Discharge Planning  document with patient/family: No     Handoff Completed: No, handoff not indicated or clinically appropriate    Patient/Spokesperson Updated: No    Additional Information:  Chart reviewed. Still with high ostomy output    Next Steps: continue to follow     Cynthia Morton RN

## 2025-06-17 LAB
ANION GAP SERPL CALCULATED.3IONS-SCNC: 11 MMOL/L (ref 7–15)
BUN SERPL-MCNC: 13.3 MG/DL (ref 8–23)
CALCIUM SERPL-MCNC: 8.6 MG/DL (ref 8.8–10.4)
CHLORIDE SERPL-SCNC: 101 MMOL/L (ref 98–107)
CREAT SERPL-MCNC: 0.65 MG/DL (ref 0.51–0.95)
EGFRCR SERPLBLD CKD-EPI 2021: >90 ML/MIN/1.73M2
GLUCOSE BLDC GLUCOMTR-MCNC: 106 MG/DL (ref 70–99)
GLUCOSE BLDC GLUCOMTR-MCNC: 126 MG/DL (ref 70–99)
GLUCOSE BLDC GLUCOMTR-MCNC: 144 MG/DL (ref 70–99)
GLUCOSE BLDC GLUCOMTR-MCNC: 158 MG/DL (ref 70–99)
GLUCOSE BLDC GLUCOMTR-MCNC: 98 MG/DL (ref 70–99)
GLUCOSE SERPL-MCNC: 120 MG/DL (ref 70–99)
HCO3 SERPL-SCNC: 19 MMOL/L (ref 22–29)
HOLD SPECIMEN: NORMAL
PHOSPHATE SERPL-MCNC: 4.3 MG/DL (ref 2.5–4.5)
POTASSIUM SERPL-SCNC: 3.5 MMOL/L (ref 3.4–5.3)
SODIUM SERPL-SCNC: 131 MMOL/L (ref 135–145)

## 2025-06-17 PROCEDURE — 99232 SBSQ HOSP IP/OBS MODERATE 35: CPT | Performed by: INTERNAL MEDICINE

## 2025-06-17 PROCEDURE — 250N000013 HC RX MED GY IP 250 OP 250 PS 637: Performed by: COLON & RECTAL SURGERY

## 2025-06-17 PROCEDURE — 120N000001 HC R&B MED SURG/OB

## 2025-06-17 PROCEDURE — 250N000013 HC RX MED GY IP 250 OP 250 PS 637

## 2025-06-17 PROCEDURE — 250N000013 HC RX MED GY IP 250 OP 250 PS 637: Performed by: INTERNAL MEDICINE

## 2025-06-17 PROCEDURE — 80048 BASIC METABOLIC PNL TOTAL CA: CPT | Performed by: INTERNAL MEDICINE

## 2025-06-17 PROCEDURE — 258N000003 HC RX IP 258 OP 636: Performed by: INTERNAL MEDICINE

## 2025-06-17 PROCEDURE — 250N000011 HC RX IP 250 OP 636: Performed by: INTERNAL MEDICINE

## 2025-06-17 PROCEDURE — 84100 ASSAY OF PHOSPHORUS: CPT | Performed by: INTERNAL MEDICINE

## 2025-06-17 PROCEDURE — 36415 COLL VENOUS BLD VENIPUNCTURE: CPT | Performed by: INTERNAL MEDICINE

## 2025-06-17 PROCEDURE — 250N000011 HC RX IP 250 OP 636

## 2025-06-17 RX ORDER — MULTIPLE VITAMINS W/ MINERALS TAB 9MG-400MCG
1 TAB ORAL DAILY
Status: DISCONTINUED | OUTPATIENT
Start: 2025-06-17 | End: 2025-06-19 | Stop reason: HOSPADM

## 2025-06-17 RX ORDER — POTASSIUM CHLORIDE 750 MG/1
10 TABLET, EXTENDED RELEASE ORAL ONCE
Status: COMPLETED | OUTPATIENT
Start: 2025-06-17 | End: 2025-06-17

## 2025-06-17 RX ORDER — LOPERAMIDE HYDROCHLORIDE 2 MG/1
4 CAPSULE ORAL 4 TIMES DAILY
Status: DISCONTINUED | OUTPATIENT
Start: 2025-06-17 | End: 2025-06-19 | Stop reason: HOSPADM

## 2025-06-17 RX ADMIN — SODIUM CHLORIDE, SODIUM LACTATE, POTASSIUM CHLORIDE, AND CALCIUM CHLORIDE: .6; .31; .03; .02 INJECTION, SOLUTION INTRAVENOUS at 23:43

## 2025-06-17 RX ADMIN — DIPHENOXYLATE HYDROCHLORIDE AND ATROPINE SULFATE 2 TABLET: .025; 2.5 TABLET ORAL at 14:01

## 2025-06-17 RX ADMIN — DIPHENOXYLATE HYDROCHLORIDE AND ATROPINE SULFATE 2 TABLET: .025; 2.5 TABLET ORAL at 17:42

## 2025-06-17 RX ADMIN — ONDANSETRON 4 MG: 4 TABLET, ORALLY DISINTEGRATING ORAL at 12:10

## 2025-06-17 RX ADMIN — HEPARIN SODIUM 5000 UNITS: 5000 INJECTION, SOLUTION INTRAVENOUS; SUBCUTANEOUS at 21:38

## 2025-06-17 RX ADMIN — SODIUM CHLORIDE, SODIUM LACTATE, POTASSIUM CHLORIDE, AND CALCIUM CHLORIDE: .6; .31; .03; .02 INJECTION, SOLUTION INTRAVENOUS at 12:15

## 2025-06-17 RX ADMIN — Medication 1 TABLET: at 14:01

## 2025-06-17 RX ADMIN — DIPHENOXYLATE HYDROCHLORIDE AND ATROPINE SULFATE 2 TABLET: .025; 2.5 TABLET ORAL at 09:22

## 2025-06-17 RX ADMIN — LOPERAMIDE HYDROCHLORIDE 4 MG: 2 CAPSULE ORAL at 17:42

## 2025-06-17 RX ADMIN — DIPHENOXYLATE HYDROCHLORIDE AND ATROPINE SULFATE 2 TABLET: .025; 2.5 TABLET ORAL at 21:38

## 2025-06-17 RX ADMIN — INSULIN ASPART 1 UNITS: 100 INJECTION, SOLUTION INTRAVENOUS; SUBCUTANEOUS at 12:15

## 2025-06-17 RX ADMIN — LOPERAMIDE HYDROCHLORIDE 4 MG: 2 CAPSULE ORAL at 09:22

## 2025-06-17 RX ADMIN — POTASSIUM CHLORIDE 10 MEQ: 750 TABLET, EXTENDED RELEASE ORAL at 09:22

## 2025-06-17 RX ADMIN — METHYLCELLULOSE 1000 MG: 500 TABLET ORAL at 09:22

## 2025-06-17 RX ADMIN — LOPERAMIDE HYDROCHLORIDE 4 MG: 2 CAPSULE ORAL at 14:01

## 2025-06-17 RX ADMIN — LOPERAMIDE HYDROCHLORIDE 4 MG: 2 CAPSULE ORAL at 21:38

## 2025-06-17 RX ADMIN — HEPARIN SODIUM 5000 UNITS: 5000 INJECTION, SOLUTION INTRAVENOUS; SUBCUTANEOUS at 05:35

## 2025-06-17 RX ADMIN — ATORVASTATIN CALCIUM 40 MG: 40 TABLET, FILM COATED ORAL at 09:22

## 2025-06-17 RX ADMIN — METHYLCELLULOSE 1000 MG: 500 TABLET ORAL at 21:38

## 2025-06-17 RX ADMIN — LISINOPRIL 5 MG: 5 TABLET ORAL at 09:22

## 2025-06-17 RX ADMIN — SODIUM CHLORIDE, SODIUM LACTATE, POTASSIUM CHLORIDE, AND CALCIUM CHLORIDE: .6; .31; .03; .02 INJECTION, SOLUTION INTRAVENOUS at 00:29

## 2025-06-17 RX ADMIN — HEPARIN SODIUM 5000 UNITS: 5000 INJECTION, SOLUTION INTRAVENOUS; SUBCUTANEOUS at 14:01

## 2025-06-17 RX ADMIN — ONDANSETRON 4 MG: 4 TABLET, ORALLY DISINTEGRATING ORAL at 09:23

## 2025-06-17 RX ADMIN — ONDANSETRON 4 MG: 4 TABLET, ORALLY DISINTEGRATING ORAL at 17:42

## 2025-06-17 ASSESSMENT — ACTIVITIES OF DAILY LIVING (ADL)
ADLS_ACUITY_SCORE: 44
ADLS_ACUITY_SCORE: 43
ADLS_ACUITY_SCORE: 43
ADLS_ACUITY_SCORE: 44
ADLS_ACUITY_SCORE: 43
ADLS_ACUITY_SCORE: 44
ADLS_ACUITY_SCORE: 43
ADLS_ACUITY_SCORE: 44

## 2025-06-17 NOTE — PROGRESS NOTES
CLINICAL NUTRITION SERVICES - REASSESSMENT NOTE     RECOMMENDATIONS FOR MDs/PROVIDERS TO ORDER:  Replace Zinc with high OP Ileostomy   MVI/M daily with high OP ileotomy, inadequate vegetable intakes    Consider stool test r/t concern for malabsorption, Ileostomy OP continues >2-2.5 L/day + weight down from admit     Registered Dietitian Interventions:  Reviewed diet modifications for high OP ileostomy     Future/Additional Recommendations:  Monitor po, weight, labs, I/Os.      INFORMATION OBTAINED  Assessed patient in room.  Pt reports improving po intakes and appetite since admit. Pt taking x3 larger meals/day + evening snack. Pt notes adding salt to meals. Pt reports stools are becoming thicker and motility slowing. Pt trying to limit fluids at meals.     CURRENT NUTRITION ORDERS  Diet: Regular    CURRENT INTAKE/TOLERANCE  Pt consuming % x3 meals 6/16, po estimated 1952 kcal and 72 g protein   Meets estimated nutrition needs      NEW FINDINGS  GI symptoms:     Ileostomy, OP: 2.95 L 6/16   UOP: 775 ml 6/16   Nutrition-relevant labs: Na 131(L), BG  last 24 hrs   Nutrition-relevant medications: Continuous IVF LR 75 ml/hr   Scheduled lipitor, lomotil QID, novolog, imodium QID, citrucel, MVI/M, zofran  Weight: 24.9% wt loss x6 months   Date/Time Weight Weight Method   06/17/25 0413 38.1 kg (84 lb) Standing scale   06/16/25 1237 38.2 kg (84 lb 3.2 oz) Standing scale   06/15/25 1918 39 kg (85 lb 14.4 oz) Standing scale   06/14/25 0849 38.8 kg (85 lb 9.6 oz) Standing scale   06/13/25 0655 34.2 kg (75 lb 4.8 oz) Standing scale   06/11/25 0639 39.1 kg (86 lb 4.8 oz) Standing scale   06/10/25 2031 38.9 kg (85 lb 12.1 oz) Bed scale     03/31/25 45.4 kg (100 lb)   01/23/25  49.3 kg (108 lb 9.6 oz)    12/05/24 50.7 kg (111 lb 12.8 oz)   05/02/24 49.8 kg (109 lb 12.8 oz)   07/20/23 52.2 kg (115 lb)   01/16/23 52.3 kg (115 lb 3.2 oz)     ASSESSED NUTRITION NEEDS  Dosing Weight: 52.3 kg, based on ideal wt  Estimated  Energy Needs: 1560+ kcals/day (30+ kcals/kg)  Justification: Increased needs and Underweight  Estimated Protein Needs: 52-63+ grams protein/day (1 - 1.2+ grams of pro/kg)  Justification: Increased needs  Estimated Fluid Needs: 1560+ mL/day (30+ mL/kg)  Justification: Increased needs or Per provider pending fluid status    MALNUTRITION  % Intake: </=75% for >/= 1 month (severe)  % Weight Loss: > 10% in 6 months (severe)   Subcutaneous Fat Loss: Orbital: Moderate  Muscle Loss: Clavicles (pectoralis and deltoids): Severe and Shoulders (deltoids): Severe  Fluid Accumulation/Edema: None noted  Malnutrition Diagnosis: Severe malnutrition in the context of chronic illness  Malnutrition Present on Admission: Yes    EVALUATION OF THE PROGRESS TOWARD GOALS     NUTRITION DIAGNOSIS  Underweight related to cancer as evidenced by BMI 15.29 and hx rectal adenocarcinoma s/p ileostomy    Evaluation: Ongoing     INTERVENTIONS  Discussed modified diet with patient and/or family- reviewed diet adjustments with high OP ileostomy     GOALS  Ileostomy OP <1.5 L/day - Not Met   UOP >/= 1.2 L/day - Not Met  Maintain weight - Not Met   Po >75% nutrition needs - Met   Electrolytes WNL - Not Met      MONITORING/EVALUATION  Progress toward goals will be monitored and evaluated per policy.

## 2025-06-17 NOTE — PLAN OF CARE
Problem: Acute Kidney Injury/Impairment  Goal: Fluid and Electrolyte Balance  Outcome: Progressing   Goal Outcome Evaluation:  Denies pain and nausea.  Tolerating a regular diet.  Patient states stool is becoming some on the soft side.  IV fluid infusing.  Independent in room.

## 2025-06-17 NOTE — PROGRESS NOTES
[facility]  Daily Progress Note    Subjective  Patient reports feeling overall good today, denies any pain or nausea. No dizziness or lightheaded. Had 2.9L from stoma yesterday. AVSS.    Objective  Intake/Output last 24 hrs:    Intake/Output Summary (Last 24 hours) at 6/17/2025 0917  Last data filed at 6/17/2025 0544  Gross per 24 hour   Intake 2041 ml   Output 3625 ml   Net -1584 ml     Temp:  [98  F (36.7  C)-98.4  F (36.9  C)] 98  F (36.7  C)  Pulse:  [91-95] 91  Resp:  [16-18] 16  BP: ()/(51-60) 111/55  SpO2:  [97 %-100 %] 100 %    Physical Exam:  General: awake, alert, laying in bed, in no acute distress  Head: normocephalic, atraumatic  Respiratory: non-labored breathing  Abdomen: soft, non-tender, non-distended   Stoma: pink, liquid green stool in bag with some form  Skin: No rashes or lesions  Musculoskeletal: moves all four extremities equally  Psychological: alert and oriented, answers questions appropriately    Pertinent Labs  Lab Results: personally reviewed.  Lab Results   Component Value Date     06/17/2025     06/16/2025     06/15/2025    CO2 19 06/17/2025    CO2 17 06/16/2025    CO2 20 06/15/2025    BUN 13.3 06/17/2025    BUN 12.8 06/16/2025    BUN 12.8 06/15/2025     Lab Results   Component Value Date    WBC 8.2 06/11/2025    WBC 10.3 06/10/2025    WBC 11.8 04/01/2025    HGB 10.6 06/12/2025    HGB 10.2 06/11/2025    HGB 14.5 06/10/2025    HCT 29.1 06/11/2025    HCT 41.1 06/10/2025    HCT 31.6 04/01/2025    MCV 87 06/16/2025    MCV 90 06/13/2025    MCV 87 06/12/2025    MCV 87 06/12/2025     06/16/2025     06/13/2025     06/12/2025       Assessment/Plan: This is a 70 year old female with a history of an LAR with DLI on chemotherapy that is presenting with a high ileostomy output.      - Regular diet  - Strict I&Os  - Metamucil BID, max lomotil  - Increase imodium 4mg QID  - IVF replacements     Discussed with Dr. Richard Figueroa,  SHAWNEE  [facility]  [phone]..............................main

## 2025-06-17 NOTE — PLAN OF CARE
Problem: Adult Inpatient Plan of Care  Goal: Optimal Comfort and Wellbeing  Outcome: Progressing     Problem: Delirium  Goal: Improved Sleep  Outcome: Progressing     Problem: Pain Acute  Goal: Optimal Pain Control and Function  Outcome: Progressing  Intervention: Prevent or Manage Pain  Recent Flowsheet Documentation  Taken 6/17/2025 0040 by Yue Guillermo RN  Medication Review/Management: medications reviewed  Taken 6/16/2025 2118 by uYe Guillermo RN  Medication Review/Management: medications reviewed     Problem: Nausea and Vomiting  Goal: Nausea and Vomiting Relief  Outcome: Progressing     Problem: Comorbidity Management  Goal: Blood Glucose Levels Within Targeted Range  Outcome: Progressing  Intervention: Monitor and Manage Glycemia  Recent Flowsheet Documentation  Taken 6/17/2025 0040 by Yue Guillermo RN  Medication Review/Management: medications reviewed  Taken 6/16/2025 2118 by Yue Guillermo RN  Medication Review/Management: medications reviewed   Goal Outcome Evaluation:       Pt is A/O x 4. Denies pain and discomfort. Colostomy intact with loose output.  & 106. VSS continue to monitor. Yue Guillermo RN

## 2025-06-18 LAB
ANION GAP SERPL CALCULATED.3IONS-SCNC: 8 MMOL/L (ref 7–15)
BUN SERPL-MCNC: 11.8 MG/DL (ref 8–23)
CALCIUM SERPL-MCNC: 8.6 MG/DL (ref 8.8–10.4)
CHLORIDE SERPL-SCNC: 102 MMOL/L (ref 98–107)
CREAT SERPL-MCNC: 0.68 MG/DL (ref 0.51–0.95)
EGFRCR SERPLBLD CKD-EPI 2021: >90 ML/MIN/1.73M2
ERYTHROCYTE [DISTWIDTH] IN BLOOD BY AUTOMATED COUNT: 17.1 % (ref 10–15)
GLUCOSE BLDC GLUCOMTR-MCNC: 107 MG/DL (ref 70–99)
GLUCOSE BLDC GLUCOMTR-MCNC: 120 MG/DL (ref 70–99)
GLUCOSE BLDC GLUCOMTR-MCNC: 125 MG/DL (ref 70–99)
GLUCOSE BLDC GLUCOMTR-MCNC: 131 MG/DL (ref 70–99)
GLUCOSE BLDC GLUCOMTR-MCNC: 92 MG/DL (ref 70–99)
GLUCOSE SERPL-MCNC: 106 MG/DL (ref 70–99)
HCO3 SERPL-SCNC: 24 MMOL/L (ref 22–29)
HCT VFR BLD AUTO: 28.9 % (ref 35–47)
HGB BLD-MCNC: 9.5 G/DL (ref 11.7–15.7)
HOLD SPECIMEN: NORMAL
MAGNESIUM SERPL-MCNC: 1.1 MG/DL (ref 1.7–2.3)
MAGNESIUM SERPL-MCNC: 2.8 MG/DL (ref 1.7–2.3)
MCH RBC QN AUTO: 29.3 PG (ref 26.5–33)
MCHC RBC AUTO-ENTMCNC: 32.9 G/DL (ref 31.5–36.5)
MCV RBC AUTO: 89 FL (ref 78–100)
PHOSPHATE SERPL-MCNC: 3.5 MG/DL (ref 2.5–4.5)
PLATELET # BLD AUTO: 416 10E3/UL (ref 150–450)
POTASSIUM SERPL-SCNC: 3.7 MMOL/L (ref 3.4–5.3)
PROCALCITONIN SERPL IA-MCNC: 0.09 NG/ML
RBC # BLD AUTO: 3.24 10E6/UL (ref 3.8–5.2)
SODIUM SERPL-SCNC: 134 MMOL/L (ref 135–145)
WBC # BLD AUTO: 15 10E3/UL (ref 4–11)

## 2025-06-18 PROCEDURE — 250N000013 HC RX MED GY IP 250 OP 250 PS 637: Performed by: COLON & RECTAL SURGERY

## 2025-06-18 PROCEDURE — 36415 COLL VENOUS BLD VENIPUNCTURE: CPT | Performed by: INTERNAL MEDICINE

## 2025-06-18 PROCEDURE — 84145 PROCALCITONIN (PCT): CPT | Performed by: INTERNAL MEDICINE

## 2025-06-18 PROCEDURE — 250N000013 HC RX MED GY IP 250 OP 250 PS 637

## 2025-06-18 PROCEDURE — 250N000011 HC RX IP 250 OP 636: Performed by: INTERNAL MEDICINE

## 2025-06-18 PROCEDURE — 84100 ASSAY OF PHOSPHORUS: CPT | Performed by: INTERNAL MEDICINE

## 2025-06-18 PROCEDURE — 258N000003 HC RX IP 258 OP 636: Performed by: INTERNAL MEDICINE

## 2025-06-18 PROCEDURE — 99233 SBSQ HOSP IP/OBS HIGH 50: CPT | Performed by: INTERNAL MEDICINE

## 2025-06-18 PROCEDURE — 120N000001 HC R&B MED SURG/OB

## 2025-06-18 PROCEDURE — 250N000011 HC RX IP 250 OP 636

## 2025-06-18 PROCEDURE — 250N000013 HC RX MED GY IP 250 OP 250 PS 637: Performed by: INTERNAL MEDICINE

## 2025-06-18 PROCEDURE — 80048 BASIC METABOLIC PNL TOTAL CA: CPT | Performed by: INTERNAL MEDICINE

## 2025-06-18 PROCEDURE — 85018 HEMOGLOBIN: CPT | Performed by: INTERNAL MEDICINE

## 2025-06-18 PROCEDURE — 83735 ASSAY OF MAGNESIUM: CPT | Performed by: INTERNAL MEDICINE

## 2025-06-18 RX ORDER — LOPERAMIDE HYDROCHLORIDE 2 MG/1
4 CAPSULE ORAL 4 TIMES DAILY
Qty: 56 CAPSULE | Refills: 0 | Status: SHIPPED | OUTPATIENT
Start: 2025-06-18 | End: 2025-06-25

## 2025-06-18 RX ORDER — MAGNESIUM SULFATE 4 G/50ML
4 INJECTION INTRAVENOUS ONCE
Status: COMPLETED | OUTPATIENT
Start: 2025-06-18 | End: 2025-06-18

## 2025-06-18 RX ORDER — DIPHENOXYLATE HYDROCHLORIDE AND ATROPINE SULFATE 2.5; .025 MG/1; MG/1
2 TABLET ORAL 4 TIMES DAILY
Qty: 56 TABLET | Refills: 0 | Status: SHIPPED | OUTPATIENT
Start: 2025-06-18 | End: 2025-06-25

## 2025-06-18 RX ORDER — POTASSIUM CHLORIDE 750 MG/1
10 TABLET, EXTENDED RELEASE ORAL ONCE
Status: COMPLETED | OUTPATIENT
Start: 2025-06-18 | End: 2025-06-18

## 2025-06-18 RX ORDER — MULTIPLE VITAMINS W/ MINERALS TAB 9MG-400MCG
1 TAB ORAL DAILY
Qty: 30 TABLET | Refills: 1 | Status: SHIPPED | OUTPATIENT
Start: 2025-06-19

## 2025-06-18 RX ADMIN — DIPHENOXYLATE HYDROCHLORIDE AND ATROPINE SULFATE 2 TABLET: .025; 2.5 TABLET ORAL at 12:53

## 2025-06-18 RX ADMIN — Medication 1 TABLET: at 08:26

## 2025-06-18 RX ADMIN — DIPHENOXYLATE HYDROCHLORIDE AND ATROPINE SULFATE 2 TABLET: .025; 2.5 TABLET ORAL at 08:25

## 2025-06-18 RX ADMIN — ONDANSETRON 4 MG: 4 TABLET, ORALLY DISINTEGRATING ORAL at 06:39

## 2025-06-18 RX ADMIN — ATORVASTATIN CALCIUM 40 MG: 40 TABLET, FILM COATED ORAL at 08:26

## 2025-06-18 RX ADMIN — LOPERAMIDE HYDROCHLORIDE 4 MG: 2 CAPSULE ORAL at 21:33

## 2025-06-18 RX ADMIN — LOPERAMIDE HYDROCHLORIDE 4 MG: 2 CAPSULE ORAL at 17:50

## 2025-06-18 RX ADMIN — LOPERAMIDE HYDROCHLORIDE 4 MG: 2 CAPSULE ORAL at 12:53

## 2025-06-18 RX ADMIN — HEPARIN SODIUM 5000 UNITS: 5000 INJECTION, SOLUTION INTRAVENOUS; SUBCUTANEOUS at 06:39

## 2025-06-18 RX ADMIN — METHYLCELLULOSE 1000 MG: 500 TABLET ORAL at 21:33

## 2025-06-18 RX ADMIN — ONDANSETRON 4 MG: 4 TABLET, ORALLY DISINTEGRATING ORAL at 17:50

## 2025-06-18 RX ADMIN — METHYLCELLULOSE 1000 MG: 500 TABLET ORAL at 08:25

## 2025-06-18 RX ADMIN — SODIUM CHLORIDE, SODIUM LACTATE, POTASSIUM CHLORIDE, AND CALCIUM CHLORIDE: .6; .31; .03; .02 INJECTION, SOLUTION INTRAVENOUS at 14:26

## 2025-06-18 RX ADMIN — HEPARIN SODIUM 5000 UNITS: 5000 INJECTION, SOLUTION INTRAVENOUS; SUBCUTANEOUS at 14:26

## 2025-06-18 RX ADMIN — POTASSIUM CHLORIDE 10 MEQ: 750 TABLET, EXTENDED RELEASE ORAL at 17:50

## 2025-06-18 RX ADMIN — DIPHENOXYLATE HYDROCHLORIDE AND ATROPINE SULFATE 2 TABLET: .025; 2.5 TABLET ORAL at 21:33

## 2025-06-18 RX ADMIN — HEPARIN SODIUM 5000 UNITS: 5000 INJECTION, SOLUTION INTRAVENOUS; SUBCUTANEOUS at 21:35

## 2025-06-18 RX ADMIN — DIPHENOXYLATE HYDROCHLORIDE AND ATROPINE SULFATE 2 TABLET: .025; 2.5 TABLET ORAL at 17:50

## 2025-06-18 RX ADMIN — ONDANSETRON 4 MG: 4 TABLET, ORALLY DISINTEGRATING ORAL at 11:28

## 2025-06-18 RX ADMIN — MAGNESIUM SULFATE HEPTAHYDRATE 4 G: 4 INJECTION, SOLUTION INTRAVENOUS at 10:11

## 2025-06-18 RX ADMIN — LISINOPRIL 5 MG: 5 TABLET ORAL at 08:26

## 2025-06-18 RX ADMIN — LOPERAMIDE HYDROCHLORIDE 4 MG: 2 CAPSULE ORAL at 08:25

## 2025-06-18 ASSESSMENT — ACTIVITIES OF DAILY LIVING (ADL)
ADLS_ACUITY_SCORE: 43
ADLS_ACUITY_SCORE: 44
ADLS_ACUITY_SCORE: 43

## 2025-06-18 NOTE — PROGRESS NOTES
Colon and Rectal [facility]  Daily Progress Note    Subjective  Patient reports feeling good. Notices her output has slowed overnight and seems to be getting some form to it. 2.1L total in 24hrs, with 425mL in last 8 hours. Would like to discharge today, knows she would need close outpatient follow-up before the weekend with oncology. AVSS.    Objective  Intake/Output last 24 hrs:    Intake/Output Summary (Last 24 hours) at 6/18/2025 0930  Last data filed at 6/18/2025 0640  Gross per 24 hour   Intake 2113 ml   Output 2525 ml   Net -412 ml     Temp:  [98.2  F (36.8  C)-98.5  F (36.9  C)] 98.2  F (36.8  C)  Pulse:  [84-93] 90  Resp:  [16] 16  BP: ()/(47-53) 119/53  SpO2:  [96 %-97 %] 97 %    Physical Exam:  General: awake, alert, laying in bed, in no acute distress  Head: normocephalic, atraumatic  Respiratory: non-labored breathing  Abdomen: soft, non-tender, non-distended   Stoma: pink, liquid stool with more form than yesterday  Skin: No rashes or lesions  Musculoskeletal: moves all four extremities equally  Psychological: alert and oriented, answers questions appropriately    Pertinent Labs  Lab Results: personally reviewed.  Lab Results   Component Value Date     06/18/2025     06/17/2025     06/16/2025    CO2 24 06/18/2025    CO2 19 06/17/2025    CO2 17 06/16/2025    BUN 11.8 06/18/2025    BUN 13.3 06/17/2025    BUN 12.8 06/16/2025     Lab Results   Component Value Date    WBC 15.0 06/18/2025    WBC 8.2 06/11/2025    WBC 10.3 06/10/2025    HGB 9.5 06/18/2025    HGB 10.6 06/12/2025    HGB 10.2 06/11/2025    HCT 28.9 06/18/2025    HCT 29.1 06/11/2025    HCT 41.1 06/10/2025    MCV 89 06/18/2025    MCV 87 06/16/2025    MCV 90 06/13/2025     06/18/2025     06/16/2025     06/13/2025       Assessment/Plan: This is a 70 year old female with a history of an LAR with DLI on chemotherapy that is presenting with a high ileostomy output.      - Regular diet  - Strict I&Os  -  Metamucil BID, max lomotil, Imodium 4mg QID (max)  - Mg replacement today  - Will discuss with Oncology team, should be able to discharge today with outpatient fluids set up for Friday.    Addend: discussed with Oncology PA - working to set up fluids for Friday, will touch base with patient this afternoon. Further discussions, patient has outpatient fluids set up for Friday, meeting with Onc provider on Monday. CRS would be OK with discharge today on current bowel medications.      Discussed with Dr. Richard Figueroa PA-C  [facility]  [phone]..............................main

## 2025-06-18 NOTE — PROGRESS NOTES
Care Management Follow Up    Length of Stay (days): 7    Expected Discharge Date: 06/19/2025    Anticipated Discharge Plan:  Home    Transportation: Anticipate Family/friend    PT Recommendations:    OT Recommendations:        Barriers to Discharge: medical stability    Prior Living Situation: house with spouse, child([place]), adult    Discussed  Partnership in Safe Discharge Planning  document with patient/family: No     Handoff Completed: No, handoff not indicated or clinically appropriate    Patient/Spokesperson Updated: No    Additional Information:  Chart reviewed. Discussed with hospitalist. Mg replacement today. Possible home tomorrow. Has follow up arranged with oncology for IV fluids on friday     Next Steps: continue to follow     Cynthia Morton RN

## 2025-06-18 NOTE — PLAN OF CARE
Problem: Adult Inpatient Plan of Care  Goal: Optimal Comfort and Wellbeing  Outcome: Progressing     Problem: Fluid Volume Deficit  Goal: Fluid Balance  Outcome: Progressing     Problem: Acute Kidney Injury/Impairment  Goal: Fluid and Electrolyte Balance  Outcome: Progressing     Problem: Comorbidity Management  Goal: Blood Glucose Levels Within Targeted Range  Outcome: Progressing     Problem: Adult Inpatient Plan of Care  Goal: Readiness for Transition of Care  Outcome: Progressing     Goal Outcome Evaluation:    A/ox3. VSS. Afebrile. Independent in room, family at bedside to assist as needed. Communicates needs. Records outputs on board for staff. Ileostomy intact and draining. Denies pain and discomfort. LR running @ 75 ml/hr. Mg 1.1, gave magnesium x1 per MD orders. Patient staying one more night to monitor labs, and anticipates discharge tomorrow to home and oncology to set up iv home infusion on Friday. Will continue to monitor.     Johnnie Loaiza RN

## 2025-06-18 NOTE — PROGRESS NOTES
CLINICAL NUTRITION SERVICES - BRIEF NOTE     RECOMMENDATIONS FOR MDs/PROVIDERS TO ORDER:  Replace Zinc with high OP Ileostomy   Replace Mg with hypomagnesemia     Consider stool test r/t concern for malabsorption, Ileostomy OP continues >2-2.5 L/day + weight down from admit        CURRENT NUTRITION ORDERS  Diet: Regular    CURRENT INTAKE/TOLERANCE  6/18: 100% breakfast, 75% lunch and dinner   Pt consuming an estimated 2200 kcal and 86 g protein, meets estimated nutrition needs      NEW FINDINGS  - Ileostomy, OP: 2.35 L 6/17    - UOP: 650 ml 6/17   - Na 134(L), Mg 1.1(L), BG  last 24 hrs. Pt on phos and K replacement protocols. Pt is adding salt to meals  - 24.9% wt loss x6 months     MALNUTRITION  Malnutrition Diagnosis: Severe malnutrition in the context of chronic illness  Malnutrition Present on Admission: Yes    GOALS  Ileostomy OP <1.5 L/day - Not Met   UOP >/= 1.2 L/day - Not Met  Maintain weight - No new weight    Po >75% nutrition needs - Met   Electrolytes WNL - Not Met      MONITORING/EVALUATION  Progress toward goals will be monitored and evaluated per policy.

## 2025-06-18 NOTE — PLAN OF CARE
Problem: Adult Inpatient Plan of Care  Goal: Optimal Comfort and Wellbeing  Outcome: Progressing   Goal Outcome Evaluation:       Pt is alert and oriented, able to make needs known. Pt is up independently in her room. Pt denies pain. Output from ostomy 425 for this shift. Pt is emptying it herself.   Uneventful shift, pt resting in between cares.  January Urbina RN

## 2025-06-18 NOTE — CONSULTS
NUTRITION EDUCATION    REASON FOR ASSESSMENT:  Provider order - Educate on Mag rich foods    CURRENT DIET:  Regular    Concern for malabsorption with high OP ileostomy, Magnesium likely not fully absorbed within ileum     Implementation:      *  Nutrition Education (Content):   A)  Provided handout: High Magnesium Food lists   B)  Discussed food source of magnesium       *  Anticipate good compliance    Goals:      *  Patient will verbalize understanding of diet      *  All of the above goals met during the education session    Follow Up/Monitoring:      *  Recommended Out-Patient Nutrition Referral, if further diet instructions are needed

## 2025-06-18 NOTE — PROGRESS NOTES
[facility]    Medicine Progress Note - Hospitalist Service    Date of Admission:  6/10/2025    Assessment & Plan   Kailee Lam is a 70 year old female admitted on 6/10/2025.  Medical history notable for rectal adenocarcinoma s/p ileostomy, recently started chemotherapy, hypertension, migraine, hyperlipidemia, osteoporosis, allergic rhinitis.  Presented to emergency department with nausea vomiting and high ostomy output.  Found to have JOSEPH     #Nausea, vomiting and high output ileostomy-quantity decreasing  #CT imaging with diffuse thickening of distal ileum, worrisome for enteritis  - Suspect due to chemotherapy  - C. difficile test, enteric bacterial virus panel negative  - Appreciate Dr. Ramos and Dr. Louis recommendations  - Scheduled Lomotil increased to 2 tablets 4 times daily  - Continue Citrucel to twice daily  - Continue scheduled Zofran prior to meals 3 times daily  - Discussed with RD and provided printed material and talk to the patient regarding ileostomy diet  - Continue to monitor and record ostomy output.  Per CRS, goal is less than 1000 mL/day  - Output for 6/16 was 2.9 L  - Continue IVF with LR at 75 mL/h    #Acute kidney injury; due to GI loss- resolved  # Electrolyte imbalance; due to GI loss- resolved  #Anion gap metabolic acidosis, suspected starvation ketosis- resolved  #hypokalemia 2/2 diarrhea  Presented to the [facility] emergency department 6/7 for decreased oral intake with recent chemotherapy, improved with 2 L of IV fluid resuscitation.  Represented to [facility] emergency department for similar concern nausea/vomiting, general malaise, increased ileostomy output.  Found to have creatinine of 1.39 with BUN of 42.  Noted difficulty with p.o. intake. Sodium 127. Suspect prerenal JOSEPH in setting of decreased oral intake.   - Continue to monitor urine output, renal function, electrolytes  - Continue to replace electrolytes per protocol    #Rectal adenocarcinoma s/p  ileostomy 3/2025  Follows with [place] [facility].  Noted to have rectal adenocarcinoma Stage IIIB(T4a, N1b, M0) moderately differentiated adenocarcinoma of the rectum.  Currently undergoing chemotherapy of capecitabine and oxaliplatin.    - Follow-up with oncology for labs next week    #Thrombocytosis  Upon admission platelets 789.  Suspect some degree of hemoconcentration.  - Fluctuating between 530s and 560s    #Prediabetes  Was on metformin.  Reports oncology has recently had her hold medication.  -most recent A1c 10/24/2024 6.3% rechecking  - Hold PTA metformin while inpatient   - Insulin sliding scale and hypoglycemia protocol    #Underweight, Suspected severe protein/calorie malnutrition  Current BMI 15.19.  Reports poor p.o. intake.  Does not wish to speak with nutrition at this time.  - Compazine as needed for nausea    #Essential hypertension  -lisinopril 5mg restarted 6/17          Diet: Regular Diet Adult    DVT Prophylaxis: Pneumatic Compression Devices  Mendoza Catheter: Not present  Lines: None     Cardiac Monitoring: None  Code Status: Full Code      Clinically Significant Risk Factors        # Hypokalemia: Lowest K = 3 mmol/L in last 2 days, will replace as needed  # Hyponatremia: Lowest Na = 131 mmol/L in last 2 days, will monitor as appropriate       # Hypoalbuminemia: Lowest albumin = 2.5 g/dL at 6/11/2025  6:23 AM, will monitor as appropriate               # DMII: A1C = 7.0 % (Ref range: <5.7 %) within past 6 months    # Severe Malnutrition: based on nutrition assessment and treatment provided per dietitian's recommendations.    # Financial/Environmental Concerns: none         Social Drivers of Health            Disposition Plan     Medically Ready for Discharge: Anticipated in 2-4 Days             Anastasiia Andrews MD  Hospitalist Service  [facility] [place]  Securely message with [employer] (more info)  Text page via [employer] Paging/Directory    ______________________________________________________________________    Interval History   Doing well. Continues to have significant output. On IVF still. No acute complaints.     Physical Exam   Vital Signs: Temp: 98.5  F (36.9  C) Temp src: Oral BP: 112/51 Pulse: 93   Resp: 16 SpO2: 96 % O2 Device: None (Room air)    Weight: 84 lbs 0 oz    General Appearance: Awake, alert, in no acute distress  Respiratory: CTAB, no wheeze  Cardiovascular: RRR, no LE edema  GI: soft, nontender, non distended, normal bowel sounds  Skin: no jaundice, no rash      Medical Decision Making       30 MINUTES SPENT BY ME on the date of service doing chart review, history, exam, documentation & further activities per the note.      Data     I have personally reviewed the following data over the past 24 hrs:    N/A  \   N/A   / N/A     131 (L) 101 13.3 /  126 (H)   3.5 19 (L) 0.65 \       Imaging results reviewed over the past 24 hrs:   No results found for this or any previous visit (from the past 24 hours).

## 2025-06-18 NOTE — PROGRESS NOTES
[facility]    Medicine Progress Note - Hospitalist Service    Date of Admission:  6/10/2025    Assessment & Plan      70 year old female admitted on 6/10/2025.  Medical history notable for rectal adenocarcinoma s/p ileostomy, recently started chemotherapy, hypertension, migraine, hyperlipidemia, osteoporosis, allergic rhinitis.  Presented to emergency department with nausea vomiting and high ostomy output.  Found to have JOSEPH      1.Nausea, vomiting and high output ileostomy-quantity decreasing  -CT imaging with diffuse thickening of distal ileum, worrisome for enteritis  - Suspect due to chemotherapy  - C. difficile test, enteric bacterial virus panel negative  - Appreciate Dr. Ramos and Dr. Louis recommendations  - Scheduled Lomotil increased to 2 tablets 4 times daily  - Continue Citrucel to twice daily  - Continue scheduled Zofran prior to meals 3 times daily  - Discussed with RD and provided printed material and talk to the patient regarding ileostomy diet will also ask them to educate on mag rich foods  - Continue to monitor and record ostomy output.  Per CRS, goal is less than 1000 mL/day  - Continue IVF with LR at 75 mL/h     2.Acute kidney injury; due to GI loss- resolved  - Electrolyte imbalance; due to GI loss- resolved  -Anion gap metabolic acidosis, suspected starvation ketosis- resolved  -hypokalemia 2/2 diarrhea  Presented to the [facility] emergency department 6/7 for decreased oral intake with recent chemotherapy, improved with 2 L of IV fluid resuscitation.  Represented to [facility] emergency department for similar concern nausea/vomiting, general malaise, increased ileostomy output.  Found to have creatinine of 1.39 with BUN of 42.  Noted difficulty with p.o. intake. Sodium 127. Suspect prerenal JOSEPH in setting of decreased oral intake.   -today , K 3.7    3.Hypomag  -1.1 today, give iv mag now 4 g  -I would advocate stay here til tomorrow for recheck     4.Rectal  adenocarcinoma s/p ileostomy 3/2025  Follows with [place] [facility].  Noted to have rectal adenocarcinoma Stage IIIB(T4a, N1b, M0) moderately differentiated adenocarcinoma of the rectum.  Currently undergoing chemotherapy of capecitabine and oxaliplatin.    - Follow-up with oncology for labs next week     5.Thrombocytosis  Upon admission platelets 789.  Suspect some degree of hemoconcentration.  -now 416    6.Leukocytosis  -she feels much improved  -no fever  -no urine symptoms  -WBC 15 today , will check in with oncology on this with recent treatments  -Onc thinks this is reactive still to overall process and will recheck WBC Friday in clinic      7.Prediabetes  Was on metformin.  Reports oncology has recently had her hold medication.  -most recent A1c 10/24/2024 6.3% rechecking  - Hold PTA metformin while inpatient   - Insulin sliding scale and hypoglycemia protocol  -would  hold metformin still      8.Underweight, Suspected severe protein/calorie malnutrition  Current BMI 15.19.  Reports poor p.o. intake.  Does not wish to speak with nutrition at this time.  - Compazine as needed for nausea     9.Essential hypertension  -lisinopril 5mg restarted 6/17        Dispo-- I updated  in room  -I told both that due to low mag and wbc 15, I would recommend stay til tomorrow  -she and  want to go home, but are willing to let me check in with CRS team and Onc teams  -Onc setting up IVF Friday with [facility] --[facility]---I called [facility] and they will set up for both ivf and iv mag to be given on Friday and will follow up on WBC too      --I did second visit at 1210--she and  agree to stay  --I offered to call son who is Diane MURRIETA, they declined and did not give permission          Diet: Regular Diet Adult    DVT Prophylaxis: Heparin SQ  Mendoza Catheter: Not present  Lines: None     Cardiac Monitoring: None  Code Status: Full Code      Clinically Significant Risk Factors        # Hypokalemia: Lowest K = 3.3 mmol/L in  last 2 days, will replace as needed  # Hyponatremia: Lowest Na = 131 mmol/L in last 2 days, will monitor as appropriate     # Hypomagnesemia: Lowest Mg = 1.1 mg/dL in last 2 days, will replace as needed   # Hypoalbuminemia: Lowest albumin = 2.5 g/dL at 6/11/2025  6:23 AM, will monitor as appropriate               # DMII: A1C = 7.0 % (Ref range: <5.7 %) within past 6 months    # Severe Malnutrition: based on nutrition assessment and treatment provided per dietitian's recommendations.    # Financial/Environmental Concerns: none         Social Drivers of Health            Disposition Plan     Medically Ready for Discharge: Anticipated Tomorrow             Nora Buckley MD  Hospitalist Service  [facility]  Securely message with [employer] (more info)  Text page via [employer] Paging/Directory   ______________________________________________________________________    Interval History   She feels better  Thinks output from ileostomy going down  No pain   here and wants to go home    Physical Exam   Vital Signs: Temp: 98.2  F (36.8  C) Temp src: Oral BP: 119/53 Pulse: 90   Resp: 16 SpO2: 97 % O2 Device: None (Room air)    Weight: 84 lbs 0 oz    Constitutional: awake, alert, cooperative, and no apparent distress  Eyes: sclera clear  Respiratory: no increased work of breathing, good air exchange, no retractions, and clear to auscultation  Cardiovascular: regular rate and rhythm and normal S1 and S2  GI: normal bowel sounds, soft, non-distended, and non-tender, ileostomy present   Skin: no bruising or bleeding  Musculoskeletal: no lower extremity pitting edema present  Neurologic: Mental Status Exam:  Level of Alertness:   awake  Neuropsychiatric: General: normal, calm, and normal eye contact    Medical Decision Making       55 MINUTES SPENT BY ME on the date of service doing chart review, history, exam, documentation & further activities per the note.      Data     I have personally reviewed the  following data over the past 24 hrs:    15.0 (H)  \   9.5 (L)   / 416     134 (L) 102 11.8 /  92   3.7 24 0.68 \     Procal: 0.09 CRP: N/A Lactic Acid: N/A         Imaging results reviewed over the past 24 hrs:   No results found for this or any previous visit (from the past 24 hours).

## 2025-06-18 NOTE — PLAN OF CARE
"Goal Outcome Evaluation:       No c/o pain this evening. Tolerating diet.   - Reports her stools are becoming softer and less liquid. Also feels like the amount of output notably decreased.   - Continues with IVF as ordered.   - ileostomy pouch intact throughout the shift. Pt emptying and managing pouch independently.  - Pt is anticipating a possible discharge home tomorrow.     Tabby Ty RN-BC    Problem: Fluid Volume Deficit  Goal: Fluid Balance  Outcome: Progressing     Problem: Adult Inpatient Plan of Care  Goal: Plan of Care Review  Description: The Plan of Care Review/Shift note should be completed every shift.  The Outcome Evaluation is a brief statement about your assessment that the patient is improving, declining, or no change.  This information will be displayed automatically on your shift  note.  Outcome: Progressing  Goal: Patient-Specific Goal (Individualized)  Description: You can add care plan individualizations to a care plan. Examples of Individualization might be:  \"Parent requests to be called daily at 9am for status\", \"I have a hard time hearing out of my right ear\", or \"Do not touch me to wake me up as it startles  me\".  Outcome: Progressing  Goal: Absence of Hospital-Acquired Illness or Injury  Outcome: Progressing  Intervention: Identify and Manage Fall Risk  Recent Flowsheet Documentation  Taken 6/17/2025 1745 by Tabby Ty, RN  Safety Promotion/Fall Prevention:   assistive device/personal items within reach   clutter free environment maintained   lighting adjusted   nonskid shoes/slippers when out of bed   room organization consistent   safety round/check completed   treat reversible contributory factors  Intervention: Prevent Skin Injury  Recent Flowsheet Documentation  Taken 6/17/2025 1742 by Tabby Ty, RN  Body Position: position changed independently  Intervention: Prevent and Manage VTE (Venous Thromboembolism) Risk  Recent Flowsheet Documentation  Taken 6/17/2025 " 1745 by Tabby Ty, RN  VTE Prevention/Management: SCDs off (sequential compression devices)  Goal: Optimal Comfort and Wellbeing  Outcome: Progressing  Goal: Readiness for Transition of Care  Outcome: Progressing

## 2025-06-18 NOTE — PROGRESS NOTES
PROGRESS NOTE     Primary Oncologist/Hematologist:  Dr. Chacon    Interval Hx: Patient ate a well rounded breakfast without complication. Output remains elevated, but progressively improving. Feels goo with maintaining oral intake at home, so long as she can get support with hydration IV.           Assessment and Plan:New actions/orders today (06/18/2025) are underlined.     70F PMHx stage IIIB [id number] invasive moderately differentiated adenocarcinoma, + LVI, + PNI, s/p robotic low anterior resection with diverting loop ileostomy 3/31/25, c/b high stoma output, who has been admitted since 6/10/25 with severe dehydration due to high ostomy output while on adjuvant Capecitabine/Oxaliplatin, which started after initiation of C2 on 5/22/25.      Rectal cancer: Stage IIIB.  - Admitted with severe increase in ostomy output following initiation of C2 CAPOX on 5/22/25 leading to dehydration, weakness.  - (DPD) enzyme level pending to assess for deficiency.  - Dr. Chacon is aware of current hospitalization: plan to follow outpatient to discuss treatment plan regarding adjustment of Capecitabine dose vs. transition to FOLFOX.  - Chemotherapy will remain on HOLD until output has returned to stable.  - Outpatient follow-up will be arranged in [place] later this week.     High Ostomy output:  - Management per CRS.    Ok from oncology standpoint with discharge.    Appointment for outpatient infusion set:  6/20 Lab @ 11:15 followed by fluids 11:45 in [place] (Infusion ok'd).   6/23 will see primary oncologist to discuss treatment options.     Lori Arredondo  [facility]  Office: [phone]  Cell: [phone] Monday through Friday, 8AM-5PM. After hours and weekends, please use answering service.           Medications:   Scheduled Medications  Current Facility-Administered Medications   Medication Dose Route Frequency Provider Last Rate Last Admin    atorvastatin (LIPITOR) tablet 40 mg  40 mg Oral Daily Adalberto  SHAWNEE Rubio   40 mg at 06/18/25 0826    [Held by provider] capecitabine (XELODA) tablet 500 mg  500 mg Oral BID Ramiro Glover PA-C        diphenoxylate-atropine (LOMOTIL) 2.5-0.025 MG per tablet 2 tablet  2 tablet Oral 4x Daily Trudy Carr MD   2 tablet at 06/18/25 0825    heparin ANTICOAGULANT injection 5,000 Units  5,000 Units Subcutaneous Q8H Ramiro Glover PA-C   5,000 Units at 06/18/25 0639    insulin aspart (NovoLOG) injection (RAPID ACTING)  1-3 Units Subcutaneous TID AC Kalin Baker MD   1 Units at 06/17/25 1215    insulin aspart (NovoLOG) injection (RAPID ACTING)  1-3 Units Subcutaneous At Bedtime Kalin Baker MD        lisinopril (ZESTRIL) tablet 5 mg  5 mg Oral Daily Anastasiia Andrews MD   5 mg at 06/18/25 0826    loperamide (IMODIUM) capsule 4 mg  4 mg Oral 4x Daily Ekaterina Ramos MD   4 mg at 06/18/25 0825    magnesium sulfate 4 g in 50 mL sterile water intermittent infusion  4 g Intravenous Once Nora Buckley MD        [Held by provider] metFORMIN (GLUCOPHAGE) tablet 500 mg  500 mg Oral Daily with supper Ramiro Glover PA-C        methylcellulose (CITRUCEL) tablet 1,000 mg  1,000 mg Oral BID Trudy Carr MD   1,000 mg at 06/18/25 0825    multivitamin w/minerals (THERA-VIT-M) tablet 1 tablet  1 tablet Oral Daily Anastasiia Andrews MD   1 tablet at 06/18/25 0826    ondansetron (ZOFRAN ODT) ODT tab 4 mg  4 mg Oral TID AC Trudy Carr MD   4 mg at 06/18/25 0639     PRN Medications  Current Facility-Administered Medications   Medication Dose Route Frequency Provider Last Rate Last Admin    acetaminophen (TYLENOL) tablet 650 mg  650 mg Oral Q4H PRN Ramiro Glover PA-C        Or    acetaminophen (TYLENOL) Suppository 650 mg  650 mg Rectal Q4H PRN Ramiro Glover PA-C        glucose gel 15-30 g  15-30 g Oral Q15 Min PRN Kalin Baker MD        Or    dextrose 50 % injection 25-50 mL  25-50 mL Intravenous Q15 Min PRKalin Ferrer MD        Or     glucagon injection 1 mg  1 mg Subcutaneous Q15 Min Kalin Petit MD        prochlorperazine (COMPAZINE) injection 5 mg  5 mg Intravenous Q6H PRRamiro Tompkins PA-C        Or    prochlorperazine (COMPAZINE) tablet 5 mg  5 mg Oral Q6H PRRamiro Tompkins PA-C   5 mg at 06/13/25 1414    senna-docusate (SENOKOT-S/PERICOLACE) 8.6-50 MG per tablet 1 tablet  1 tablet Oral BID Ramiro Zhao PA-C        Or    senna-docusate (SENOKOT-S/PERICOLACE) 8.6-50 MG per tablet 2 tablet  2 tablet Oral BID Ramiro Zhao PA-C                      Physical Exam:   Vitals were reviewed  Blood pressure 119/53, pulse 90, temperature 98.2  F (36.8  C), temperature source Oral, resp. rate 16, weight 38.1 kg (84 lb), SpO2 97%.  Wt Readings from Last 4 Encounters:   06/17/25 38.1 kg (84 lb)   03/31/25 45.4 kg (100 lb)   12/05/24 50.7 kg (111 lb 12.8 oz)   05/02/24 49.8 kg (109 lb 12.8 oz)       I/O last 3 completed shifts:  In: 3296 [P.O.:760; I.V.:2536]  Out: 3025 [Urine:925; Stool:2100]               Data:   All laboratory data and imaging studies reviewed.    CMP  Recent Labs   Lab 06/18/25  0800 06/18/25  0631 06/18/25  0212 06/17/25  2113 06/17/25  0616 06/17/25  0600 06/16/25  2120 06/16/25  1703 06/16/25  1440 06/16/25  0812 06/16/25  0624 06/15/25  0748 06/15/25  0702   NA  --  134*  --   --   --  131*  --   --   --   --  132*  --  135   POTASSIUM  --  3.7  --   --   --  3.5 3.5  --  3.3*  --  3.0*  --  3.8   CHLORIDE  --  102  --   --   --  101  --   --   --   --  99  --  103   CO2  --  24  --   --   --  19*  --   --   --   --  17*  --  20*   ANIONGAP  --  8  --   --   --  11  --   --   --   --  16*  --  12   GLC 92 106* 107* 158*   < > 120*  --    < >  --    < > 111*   < > 135*   BUN  --  11.8  --   --   --  13.3  --   --   --   --  12.8  --  12.8   CR  --  0.68  --   --   --  0.65  --   --   --   --  0.73  --  0.78   GFRESTIMATED  --  >90  --   --   --  >90  --   --   --   --  89  --  82   PIETER  --  8.6*  --    --   --  8.6*  --   --   --   --  8.8  --  9.0   MAG  --  1.1*  --   --   --   --   --   --   --   --   --   --   --    PHOS  --  3.5  --   --   --  4.3  --   --   --   --  4.4  --  4.1    < > = values in this interval not displayed.     CBC  Recent Labs   Lab 06/18/25  0631 06/16/25  0624 06/13/25  0656 06/12/25  0636   WBC 15.0*  --   --   --    RBC 3.24*  --   --   --    HGB 9.5*  --   --  10.6*   HCT 28.9*  --   --   --    MCV 89 87 90 87  87   MCH 29.3  --   --   --    MCHC 32.9  --   --   --    RDW 17.1*  --   --   --     564* 530* 568*     INRNo lab results found in last 7 days.  Data   Results for orders placed or performed during the hospital encounter of 06/10/25 (from the past 24 hours)   Glucose by meter   Result Value Ref Range    GLUCOSE BY METER POCT 144 (H) 70 - 99 mg/dL   Glucose by meter   Result Value Ref Range    GLUCOSE BY METER POCT 126 (H) 70 - 99 mg/dL   Glucose by meter   Result Value Ref Range    GLUCOSE BY METER POCT 158 (H) 70 - 99 mg/dL   Glucose by meter   Result Value Ref Range    GLUCOSE BY METER POCT 107 (H) 70 - 99 mg/dL   Basic metabolic panel   Result Value Ref Range    Sodium 134 (L) 135 - 145 mmol/L    Potassium 3.7 3.4 - 5.3 mmol/L    Chloride 102 98 - 107 mmol/L    Carbon Dioxide (CO2) 24 22 - 29 mmol/L    Anion Gap 8 7 - 15 mmol/L    Urea Nitrogen 11.8 8.0 - 23.0 mg/dL    Creatinine 0.68 0.51 - 0.95 mg/dL    GFR Estimate >90 >60 mL/min/1.73m2    Calcium 8.6 (L) 8.8 - 10.4 mg/dL    Glucose 106 (H) 70 - 99 mg/dL   Phosphorus   Result Value Ref Range    Phosphorus 3.5 2.5 - 4.5 mg/dL   CBC with platelets   Result Value Ref Range    WBC Count 15.0 (H) 4.0 - 11.0 10e3/uL    RBC Count 3.24 (L) 3.80 - 5.20 10e6/uL    Hemoglobin 9.5 (L) 11.7 - 15.7 g/dL    Hematocrit 28.9 (L) 35.0 - 47.0 %    MCV 89 78 - 100 fL    MCH 29.3 26.5 - 33.0 pg    MCHC 32.9 31.5 - 36.5 g/dL    RDW 17.1 (H) 10.0 - 15.0 %    Platelet Count 416 150 - 450 10e3/uL   Procalcitonin   Result Value Ref Range     Procalcitonin 0.09 <0.50 ng/mL   Magnesium   Result Value Ref Range    Magnesium 1.1 (L) 1.7 - 2.3 mg/dL   Glucose by meter   Result Value Ref Range    GLUCOSE BY METER POCT 92 70 - 99 mg/dL

## 2025-06-19 VITALS
SYSTOLIC BLOOD PRESSURE: 106 MMHG | WEIGHT: 86.3 LBS | HEART RATE: 85 BPM | DIASTOLIC BLOOD PRESSURE: 52 MMHG | BODY MASS INDEX: 15.29 KG/M2 | RESPIRATION RATE: 18 BRPM | TEMPERATURE: 98.2 F | OXYGEN SATURATION: 98 %

## 2025-06-19 LAB
ANION GAP SERPL CALCULATED.3IONS-SCNC: 10 MMOL/L (ref 7–15)
BUN SERPL-MCNC: 8.3 MG/DL (ref 8–23)
CALCIUM SERPL-MCNC: 8.1 MG/DL (ref 8.8–10.4)
CHLORIDE SERPL-SCNC: 100 MMOL/L (ref 98–107)
CREAT SERPL-MCNC: 0.58 MG/DL (ref 0.51–0.95)
EGFRCR SERPLBLD CKD-EPI 2021: >90 ML/MIN/1.73M2
ERYTHROCYTE [DISTWIDTH] IN BLOOD BY AUTOMATED COUNT: 17.6 % (ref 10–15)
GLUCOSE BLDC GLUCOMTR-MCNC: 112 MG/DL (ref 70–99)
GLUCOSE BLDC GLUCOMTR-MCNC: 119 MG/DL (ref 70–99)
GLUCOSE BLDC GLUCOMTR-MCNC: 92 MG/DL (ref 70–99)
GLUCOSE SERPL-MCNC: 97 MG/DL (ref 70–99)
HCO3 SERPL-SCNC: 25 MMOL/L (ref 22–29)
HCT VFR BLD AUTO: 28.4 % (ref 35–47)
HGB BLD-MCNC: 9.2 G/DL (ref 11.7–15.7)
HOLD SPECIMEN: NORMAL
MAGNESIUM SERPL-MCNC: 2 MG/DL (ref 1.7–2.3)
MCH RBC QN AUTO: 29.3 PG (ref 26.5–33)
MCHC RBC AUTO-ENTMCNC: 32.4 G/DL (ref 31.5–36.5)
MCV RBC AUTO: 90 FL (ref 78–100)
PHOSPHATE SERPL-MCNC: 3.4 MG/DL (ref 2.5–4.5)
PLATELET # BLD AUTO: 407 10E3/UL (ref 150–450)
POTASSIUM SERPL-SCNC: 3.1 MMOL/L (ref 3.4–5.3)
POTASSIUM SERPL-SCNC: 3.8 MMOL/L (ref 3.4–5.3)
RBC # BLD AUTO: 3.14 10E6/UL (ref 3.8–5.2)
SODIUM SERPL-SCNC: 135 MMOL/L (ref 135–145)
WBC # BLD AUTO: 12.5 10E3/UL (ref 4–11)

## 2025-06-19 PROCEDURE — 80048 BASIC METABOLIC PNL TOTAL CA: CPT | Performed by: INTERNAL MEDICINE

## 2025-06-19 PROCEDURE — 250N000013 HC RX MED GY IP 250 OP 250 PS 637: Performed by: INTERNAL MEDICINE

## 2025-06-19 PROCEDURE — 99239 HOSP IP/OBS DSCHRG MGMT >30: CPT | Performed by: INTERNAL MEDICINE

## 2025-06-19 PROCEDURE — 36415 COLL VENOUS BLD VENIPUNCTURE: CPT | Performed by: INTERNAL MEDICINE

## 2025-06-19 PROCEDURE — 258N000003 HC RX IP 258 OP 636: Performed by: INTERNAL MEDICINE

## 2025-06-19 PROCEDURE — 250N000011 HC RX IP 250 OP 636: Performed by: INTERNAL MEDICINE

## 2025-06-19 PROCEDURE — 250N000011 HC RX IP 250 OP 636

## 2025-06-19 PROCEDURE — 250N000013 HC RX MED GY IP 250 OP 250 PS 637: Performed by: COLON & RECTAL SURGERY

## 2025-06-19 PROCEDURE — 250N000013 HC RX MED GY IP 250 OP 250 PS 637

## 2025-06-19 PROCEDURE — 83735 ASSAY OF MAGNESIUM: CPT | Performed by: INTERNAL MEDICINE

## 2025-06-19 PROCEDURE — 84100 ASSAY OF PHOSPHORUS: CPT | Performed by: INTERNAL MEDICINE

## 2025-06-19 PROCEDURE — 84132 ASSAY OF SERUM POTASSIUM: CPT | Performed by: INTERNAL MEDICINE

## 2025-06-19 PROCEDURE — 85027 COMPLETE CBC AUTOMATED: CPT | Performed by: INTERNAL MEDICINE

## 2025-06-19 RX ORDER — POTASSIUM CHLORIDE 750 MG/1
10 TABLET, EXTENDED RELEASE ORAL DAILY
Qty: 7 TABLET | Refills: 0 | Status: SHIPPED | OUTPATIENT
Start: 2025-06-20 | End: 2025-06-27

## 2025-06-19 RX ORDER — POTASSIUM CHLORIDE 1500 MG/1
20 TABLET, EXTENDED RELEASE ORAL ONCE
Status: COMPLETED | OUTPATIENT
Start: 2025-06-19 | End: 2025-06-19

## 2025-06-19 RX ORDER — POTASSIUM CHLORIDE 750 MG/1
10 TABLET, EXTENDED RELEASE ORAL DAILY
Status: DISCONTINUED | OUTPATIENT
Start: 2025-06-20 | End: 2025-06-19 | Stop reason: HOSPADM

## 2025-06-19 RX ADMIN — DIPHENOXYLATE HYDROCHLORIDE AND ATROPINE SULFATE 2 TABLET: .025; 2.5 TABLET ORAL at 13:21

## 2025-06-19 RX ADMIN — LISINOPRIL 5 MG: 5 TABLET ORAL at 08:15

## 2025-06-19 RX ADMIN — SODIUM CHLORIDE, SODIUM LACTATE, POTASSIUM CHLORIDE, AND CALCIUM CHLORIDE: .6; .31; .03; .02 INJECTION, SOLUTION INTRAVENOUS at 04:17

## 2025-06-19 RX ADMIN — ONDANSETRON 4 MG: 4 TABLET, ORALLY DISINTEGRATING ORAL at 11:41

## 2025-06-19 RX ADMIN — LOPERAMIDE HYDROCHLORIDE 4 MG: 2 CAPSULE ORAL at 13:21

## 2025-06-19 RX ADMIN — ATORVASTATIN CALCIUM 40 MG: 40 TABLET, FILM COATED ORAL at 08:15

## 2025-06-19 RX ADMIN — DIPHENOXYLATE HYDROCHLORIDE AND ATROPINE SULFATE 2 TABLET: .025; 2.5 TABLET ORAL at 08:14

## 2025-06-19 RX ADMIN — Medication 1 TABLET: at 08:15

## 2025-06-19 RX ADMIN — LOPERAMIDE HYDROCHLORIDE 4 MG: 2 CAPSULE ORAL at 08:14

## 2025-06-19 RX ADMIN — HEPARIN SODIUM 5000 UNITS: 5000 INJECTION, SOLUTION INTRAVENOUS; SUBCUTANEOUS at 06:43

## 2025-06-19 RX ADMIN — METHYLCELLULOSE 1000 MG: 500 TABLET ORAL at 08:14

## 2025-06-19 RX ADMIN — POTASSIUM CHLORIDE 20 MEQ: 1500 TABLET, EXTENDED RELEASE ORAL at 08:14

## 2025-06-19 RX ADMIN — ONDANSETRON 4 MG: 4 TABLET, ORALLY DISINTEGRATING ORAL at 06:43

## 2025-06-19 ASSESSMENT — ACTIVITIES OF DAILY LIVING (ADL)
ADLS_ACUITY_SCORE: 43
ADLS_ACUITY_SCORE: 41
ADLS_ACUITY_SCORE: 41
ADLS_ACUITY_SCORE: 43

## 2025-06-19 NOTE — PLAN OF CARE
Problem: Acute Kidney Injury/Impairment  Goal: Fluid and Electrolyte Balance  Outcome: Adequate for Care Transition   Goal Outcome Evaluation:       Potassium replacement given this AM, recheck 3.8.  Pt discharging this afternoon.  Discharge teaching completed with pt and .  Pt to follow up tomorrow with oncology.    Dione Hale RN

## 2025-06-19 NOTE — DISCHARGE SUMMARY
[facility] MEDICINE  DISCHARGE SUMMARY     Primary Care Physician: System, Provider Not In  Admission Date: 6/10/2025   Discharge Provider: Nora Buckley MD Discharge Date: 6/19/2025   Diet:   Active Diet and Nourishment Order   Procedures    Regular Diet Adult    Diet       Code Status: Full Code   Activity: DCACTIVITY: Activity as tolerated        Condition at Discharge: Fair     REASON FOR PRESENTATION(See Admission Note for Details)   N/v/d    PRINCIPAL & ACTIVE DISCHARGE DIAGNOSES     Active Problems:    Acute kidney injury    Increased ileostomy output (H)    Nausea and vomiting, unspecified vomiting type    Encounter for attention to ileostomy (H)      PENDING LABS     Unresulted Labs Ordered in the Past 30 Days of this Admission       Date and Time Order Name Status Description    6/15/2025  7:14 AM [id number]; Dihydropyrimidine Dehydrogenase (DPYD): [facility] Miscellaneous Test In process               PROCEDURES ( this hospitalization only)          RECOMMENDATIONS TO OUTPATIENT PROVIDER FOR F/U VISIT     Follow-up Appointments       Follow Up      Follow up with [facility] tomorrow in infusion and get labs, Bmp, mag, and CBC, and then see  Monday in cancer clinic        [facility] to Primary Care - Establish PCP Referral      Please be aware that coverage of these services is subject to the terms and limitations of your health insurance plan.  Call member services at your health plan with any benefit or coverage questions.    Schedule Primary Care visit within: 7 Days                   DISPOSITION     Home, go to infusion cancer [facility] tomorrow for labs and ivf    SUMMARY OF HOSPITAL COURSE:      70 year old female admitted on 6/10/2025.  Medical history notable for rectal adenocarcinoma s/p ileostomy, recently started chemotherapy, hypertension, migraine, hyperlipidemia, osteoporosis, allergic rhinitis.  Presented to emergency department with nausea vomiting and  high ostomy output.  Found to have JOSEPH      1.Nausea, vomiting and high output ileostomy-quantity decreasing  -CT imaging with diffuse thickening of distal ileum, worrisome for enteritis  - Suspect due to chemotherapy  - C. difficile test, enteric bacterial virus panel negative  - Appreciate Dr. Ramos and Dr. Louis recommendations  - Scheduled Lomotil increased to 2 tablets 4 times daily  - Continue Citrucel to twice daily  - Continue scheduled Zofran prior to meals 3 times daily  - Discussed with RD and provided printed material and talk to the patient regarding ileostomy diet will also ask them to educate on mag rich foods  - Continue to monitor and record ostomy output.  Per CRS, goal is less than 1000 mL/day  - Continue IVF with LR at 75 mL/h     2.Acute kidney injury; due to GI loss- resolved  - Electrolyte imbalance; due to GI loss- resolved  -Anion gap metabolic acidosis, suspected starvation ketosis- resolved  -hypokalemia 2/2 diarrhea  Presented to the [facility] emergency department 6/7 for decreased oral intake with recent chemotherapy, improved with 2 L of IV fluid resuscitation.  Represented to [facility] emergency department for similar concern nausea/vomiting, general malaise, increased ileostomy output.  Found to have creatinine of 1.39 with BUN of 42.  Noted difficulty with p.o. intake. Sodium 127. Suspect prerenal JOSEPH in setting of decreased oral intake.   -today , K 3.1--replace k today prior to discharge     3.Hypomag  -replaced and now  up to 2.0  today  -will get labs tomorrow in infusion     4.Rectal adenocarcinoma s/p ileostomy 3/2025  Follows with [place] [facility].  Noted to have rectal adenocarcinoma Stage IIIB(T4a, N1b, M0) moderately differentiated adenocarcinoma of the rectum.  Currently undergoing chemotherapy of capecitabine and oxaliplatin.    - Follow-up with oncology for labs tomorrow and next week     5.Thrombocytosis  Upon admission platelets 789.  Suspect some degree of  hemoconcentration.  -now 407     6.Leukocytosis  -she feels much improved  -no fever  -no urine symptoms  -15-->12,suspect resolving inflammation of gi issues   -Onc thinks this is reactive still to overall process and will recheck WBC Friday in clinic      7.Prediabetes  Was on metformin.  Reports oncology has recently had her hold medication.  -most recent A1c 10/24/2024 6.3% rechecking  - Hold PTA metformin while inpatient   - Insulin sliding scale and hypoglycemia protocol  -would  hold metformin still      8.Underweight, Suspected severe protein/calorie malnutrition  Current BMI 15.19.  Reports poor p.o. intake.  Does not wish to speak with nutrition at this time.  - Compazine as needed for nausea     9.Essential hypertension  -lisinopril 5mg restarted 6/17    10.Hypokalemia  -will send home for kcl 10 meq daily for next week  -close follow up labs in cancer care        Dispo-- home today after last k check--recheck after replacement this afternoon was 3.8         Discharge Medications with Med changes:     Current Discharge Medication List        START taking these medications    Details   loperamide (IMODIUM) 2 MG capsule Take 2 capsules (4 mg) by mouth 4 times daily for 7 days.  Qty: 56 capsule, Refills: 0    Associated Diagnoses: Rectal cancer (H); High output ileostomy (H)      methylcellulose (CITRUCEL) 500 MG TABS tablet Take 2 tablets (1,000 mg) by mouth 2 times daily.  Qty: 120 tablet, Refills: 2    Associated Diagnoses: High output ileostomy (H)      multivitamin w/minerals (THERA-VIT-M) tablet Take 1 tablet by mouth daily.  Qty: 30 tablet, Refills: 1    Associated Diagnoses: High output ileostomy (H)      ondansetron (ZOFRAN ODT) 4 MG ODT tab Take 1 tablet (4 mg) by mouth every 8 hours as needed for nausea.  Qty: 90 tablet, Refills: 2    Associated Diagnoses: Nausea           CONTINUE these medications which have CHANGED    Details   !! diphenoxylate-atropine (LOMOTIL) 2.5-0.025 MG tablet Take 2  tablets by mouth 4 times daily for 7 days.  Qty: 56 tablet, Refills: 0    Associated Diagnoses: Rectal cancer (H); High output ileostomy (H)      !! diphenoxylate-atropine (LOMOTIL) 2.5-0.025 MG tablet Take 2 tablets by mouth 4 times daily as needed for diarrhea.  Qty: 60 tablet, Refills: 2    Associated Diagnoses: High output ileostomy (H)       !! - Potential duplicate medications found. Please discuss with provider.        CONTINUE these medications which have NOT CHANGED    Details   alendronate (FOSAMAX) 70 MG tablet Take 1 Tablet (70 mg) by mouth once every week. Take 30 minutes before first food-drink-medication. Avoid lying down for 30 minutes.*      atorvastatin (LIPITOR) 40 MG tablet Take 1 Tablet (40 mg) by mouth daily.*      Calcium Carbonate-Vitamin D 250-3.125 MG-MCG TABS Take 1 Tablet (250 mg elemental Ca) by mouth daily.      capecitabine (XELODA) 500 MG tablet Take 500 mg by mouth 2 times daily.      lisinopril (ZESTRIL) 5 MG tablet Take 1 Tablet (5 mg) by mouth daily.*      metFORMIN (GLUCOPHAGE) 500 MG tablet Take 500 mg by mouth daily (with dinner).           STOP taking these medications       Multiple Vitamins-Iron (MULTIVITAMIN/IRON PO) Comments:   Reason for Stopping:         prochlorperazine (COMPAZINE) 5 MG tablet Comments:   Reason for Stopping:                     Rationale for medication changes:      Zofran ODT  Lomotil  Mv  Citrucel  KCL 10 meq po daily x 7 days          Consults       CARE MANAGEMENT / SOCIAL WORK IP CONSULT  GASTROENTEROLOGY IP CONSULT  WOUND OSTOMY CONTINENCE NURSE  IP CONSULT  CARE MANAGEMENT / SOCIAL WORK IP CONSULT  GASTROENTEROLOGY IP CONSULT  HEMATOLOGY & ONCOLOGY IP CONSULT  NUTRITION SERVICES ADULT IP CONSULT  NUTRITION SERVICES ADULT IP CONSULT    Immunizations given this encounter     Most Recent Immunizations   Administered Date(s) Administered    COVID-19 12+ (Pfizer) 11/16/2023    COVID-19 Bivalent 12+ (Pfizer) 10/27/2022    COVID-19 MONOVALENT 12+  "(Pfizer) 11/09/2021    COVID-19 Monovalent 12+ (Pfizer 2022) 05/31/2022    Influenza Vaccine 65+ (Fluzone HD) 10/27/2022    RSV Vaccine (Arexvy) 12/15/2023           Anticoagulation Information      Recent INR results: No results for input(s): \"INR\" in the last 168 hours.  Warfarin doses (if applicable) or name of other anticoagulant: none      SIGNIFICANT IMAGING FINDINGS     Results for orders placed or performed during the hospital encounter of 06/10/25   XR Chest Port 1 View    Impression    IMPRESSION: Lungs are clear. Heart and pulmonary vascularity are normal. No signs of acute disease.   CT Abdomen Pelvis w/o Contrast    Impression    IMPRESSION:   1.  Interval right lower quadrant ileostomy. Diffuse thickening of the distal ileum, worrisome for ongoing enteritis (infectious or inflammatory). No mechanical obstruction, perforation or abscess formation. Postoperative changes in the distal rectum.   Sigmoid diverticulosis.    2.  Stable small left hepatic lobe hypodensity.         SIGNIFICANT LABORATORY FINDINGS     Most Recent 3 CBC's:  Recent Labs   Lab Test 06/19/25  0657 06/18/25  0631 06/16/25  0624 06/13/25  0656 06/12/25  0636 06/11/25  0623   WBC 12.5* 15.0*  --   --   --  8.2   HGB 9.2* 9.5*  --   --  10.6* 10.2*   MCV 90 89 87   < > 87  87 86    416 564*   < > 568* 533*    < > = values in this interval not displayed.     Most Recent 3 BMP's:  Recent Labs   Lab Test 06/19/25  0813 06/19/25  0657 06/19/25  0222 06/18/25  0800 06/18/25  0631 06/17/25  0616 06/17/25  0600   NA  --  135  --   --  134*  --  131*   POTASSIUM  --  3.1*  --   --  3.7  --  3.5   CHLORIDE  --  100  --   --  102  --  101   CO2  --  25  --   --  24  --  19*   BUN  --  8.3  --   --  11.8  --  13.3   CR  --  0.58  --   --  0.68  --  0.65   ANIONGAP  --  10  --   --  8  --  11   PIETER  --  8.1*  --   --  8.6*  --  8.6*   GLC 92 97 119*   < > 106*   < > 120*    < > = values in this interval not displayed.     Most Recent 2 " LFT's:  Recent Labs   Lab Test 06/11/25  0623 06/10/25  1751   AST 21 24   ALT 7 10   ALKPHOS 85 136   BILITOTAL 0.3 0.5       CT Abdomen Pelvis w/o Contrast  Result Date: 6/11/2025  EXAM: CT ABDOMEN PELVIS W/O CONTRAST LOCATION: [facility] DATE: 6/10/2025 INDICATION: Persistent nausea and vomiting post ileostomy. COMPARISON: CT chest, abdomen and pelvis with IV contrast 03/03/2025. TECHNIQUE: CT scan of the abdomen and pelvis was performed without IV contrast. Multiplanar reformats were obtained. Dose reduction techniques were used. CONTRAST: None. FINDINGS: LOWER CHEST: Minor strand of linear atelectasis in the inferior lingula and the right lower lobe. No pleural effusion on either side. Normal cardiac size. No pericardial effusion. No significant change. HEPATOBILIARY: Small hypodensity in the left hepatic lobe anteriorly measures 0.6 cm (image 43, series 3), unchanged. No calcified gallstones, biliary dilatation or adjacent inflammation. PANCREAS: Normal. SPLEEN: Normal. ADRENAL GLANDS: Normal. KIDNEYS/BLADDER: No urinary tract calculi. Both kidneys are negative for hydronephrosis or hydroureter. Normal urinary bladder. BOWEL: Interval right lower quadrant ileostomy. Mild diffuse thickening of the distal ileum (images 101-135, series 4, images 23-41, series 4, images 52-63, series 5), worrisome for ongoing enteritis (infectious or inflammatory). No mechanical obstruction, perforation or abscess formation. Postoperative changes in the distal rectum. Sigmoid diverticulosis. LYMPH NODES: No suspicious abdominopelvic adenopathy. VASCULATURE: Atherosclerotic normal caliber distal abdominal aorta measuring 1.4 x 1.4 cm (image 78, series 3). Normal caliber IVC. PELVIC ORGANS: Distal colonic diverticulosis. Interval postoperative changes in the distal rectum. Postmenopausal uterus. Atherosclerotic changes. No adenopathy or free fluid. MUSCULOSKELETAL: Degenerative narrowing of the lumbosacral  interspace.     IMPRESSION: 1.  Interval right lower quadrant ileostomy. Diffuse thickening of the distal ileum, worrisome for ongoing enteritis (infectious or inflammatory). No mechanical obstruction, perforation or abscess formation. Postoperative changes in the distal rectum. Sigmoid diverticulosis. 2.  Stable small left hepatic lobe hypodensity.     XR Chest Port 1 View  Result Date: 6/10/2025  EXAM: XR CHEST PORT 1 VIEW LOCATION: [facility] DATE: 6/10/2025 INDICATION: Malaise, nausea and vomiting. Rectal cancer. COMPARISON: CT CAP 4/25/2025     IMPRESSION: Lungs are clear. Heart and pulmonary vascularity are normal. No signs of acute disease.        Discharge Orders        Hospital to Primary Care - Establish PCP Referral      Reason for your hospital stay    N/v/d     Activity    Your activity upon discharge: activity as tolerated     Follow Up    Follow up with [facility] tomorrow in infusion and get labs, Bmp, mag, and CBC, and then see  Monday in cancer clinic     Diet    Follow this diet upon discharge: Current Diet:Orders Placed This Encounter      Regular Diet Adult       Examination   Physical Exam   Temp:  [97.8  F (36.6  C)-98.4  F (36.9  C)] 98.2  F (36.8  C)  Pulse:  [85-92] 85  Resp:  [16-18] 18  BP: (103-122)/(52-60) 106/52  SpO2:  [95 %-99 %] 98 %  Wt Readings from Last 1 Encounters:   06/18/25 39.1 kg (86 lb 4.8 oz)       See today's note    Please see EMR for more detailed significant labs, imaging, consultant notes etc.    I, Nora Buckley MD, personally saw the patient today and spent greater than 30 minutes discharging this patient.    Nora Buckley MD  [facility]    CC:System, Provider Not In

## 2025-06-19 NOTE — PROGRESS NOTES
[facility]    Medicine Progress Note - Hospitalist Service    Date of Admission:  6/10/2025    Assessment & Plan   70 year old female admitted on 6/10/2025.  Medical history notable for rectal adenocarcinoma s/p ileostomy, recently started chemotherapy, hypertension, migraine, hyperlipidemia, osteoporosis, allergic rhinitis.  Presented to emergency department with nausea vomiting and high ostomy output.  Found to have JOSEPH      1.Nausea, vomiting and high output ileostomy-quantity decreasing  -CT imaging with diffuse thickening of distal ileum, worrisome for enteritis  - Suspect due to chemotherapy  - C. difficile test, enteric bacterial virus panel negative  - Appreciate Dr. Ramos and Dr. Louis recommendations  - Scheduled Lomotil increased to 2 tablets 4 times daily  - Continue Citrucel to twice daily  - Continue scheduled Zofran prior to meals 3 times daily  - Discussed with RD and provided printed material and talk to the patient regarding ileostomy diet will also ask them to educate on mag rich foods  - Continue to monitor and record ostomy output.  Per CRS, goal is less than 1000 mL/day  - Continue IVF with LR at 75 mL/h     2.Acute kidney injury; due to GI loss- resolved  - Electrolyte imbalance; due to GI loss- resolved  -Anion gap metabolic acidosis, suspected starvation ketosis- resolved  -hypokalemia 2/2 diarrhea  Presented to the [facility] emergency department 6/7 for decreased oral intake with recent chemotherapy, improved with 2 L of IV fluid resuscitation.  Represented to [facility] emergency department for similar concern nausea/vomiting, general malaise, increased ileostomy output.  Found to have creatinine of 1.39 with BUN of 42.  Noted difficulty with p.o. intake. Sodium 127. Suspect prerenal JOSEPH in setting of decreased oral intake.   -today , K 3.1--replace k today prior to discharge    3.Hypomag  -replaced and now  up to 2.0  today  -will get labs tomorrow in infusion      4.Rectal adenocarcinoma s/p ileostomy 3/2025  Follows with [place] [facility].  Noted to have rectal adenocarcinoma Stage IIIB(T4a, N1b, M0) moderately differentiated adenocarcinoma of the rectum.  Currently undergoing chemotherapy of capecitabine and oxaliplatin.    - Follow-up with oncology for labs tomorrow and next week     5.Thrombocytosis  Upon admission platelets 789.  Suspect some degree of hemoconcentration.  -now 407    6.Leukocytosis  -she feels much improved  -no fever  -no urine symptoms  -15-->12,suspect resolving inflammation of gi issues   -Onc thinks this is reactive still to overall process and will recheck WBC Friday in clinic      7.Prediabetes  Was on metformin.  Reports oncology has recently had her hold medication.  -most recent A1c 10/24/2024 6.3% rechecking  - Hold PTA metformin while inpatient   - Insulin sliding scale and hypoglycemia protocol  -would  hold metformin still      8.Underweight, Suspected severe protein/calorie malnutrition  Current BMI 15.19.  Reports poor p.o. intake.  Does not wish to speak with nutrition at this time.  - Compazine as needed for nausea     9.Essential hypertension  -lisinopril 5mg restarted 6/17        Dispo-- home today after last k check    Updated            Diet: Regular Diet Adult    DVT Prophylaxis: Pneumatic Compression Devices  Mendoza Catheter: Not present  Lines: None     Cardiac Monitoring: ACTIVE order. Indication: electrolytes  Code Status: Full Code      Clinically Significant Risk Factors        # Hypokalemia: Lowest K = 3.1 mmol/L in last 2 days, will replace as needed  # Hyponatremia: Lowest Na = 134 mmol/L in last 2 days, will monitor as appropriate     # Hypomagnesemia: Lowest Mg = 1.1 mg/dL in last 2 days, will replace as needed   # Hypoalbuminemia: Lowest albumin = 2.5 g/dL at 6/11/2025  6:23 AM, will monitor as appropriate               # DMII: A1C = 7.0 % (Ref range: <5.7 %) within past 6 months    # Severe Malnutrition: based  on nutrition assessment and treatment provided per dietitian's recommendations.    # Financial/Environmental Concerns: none         Social Drivers of Health            Disposition Plan     Medically Ready for Discharge: Anticipated Today             Nora Buckley MD  Hospitalist Service  [facility]  Securely message with [employer] (more info)  Text page via [employer] Paging/Directory   ______________________________________________________________________    Interval History   She feels good  Eating well  Less output  No pain  Wants to go home  Notes she will take MV with zinc in it    Physical Exam   Vital Signs: Temp: 98.2  F (36.8  C) Temp src: Oral BP: 106/52 Pulse: 85   Resp: 18 SpO2: 98 % O2 Device: None (Room air)    Weight: 86 lbs 4.8 oz    Constitutional: awake, alert, cooperative, and no apparent distress  Eyes: sclera clear  Respiratory: no increased work of breathing, good air exchange, no retractions, and clear to auscultation  Cardiovascular: regular rate and rhythm and normal S1 and S2  GI: normal bowel sounds, soft, non-distended, and non-tender, ilesotomy  Skin: no bruising or bleeding  Musculoskeletal: no lower extremity pitting edema present  Neurologic: Mental Status Exam:  Level of Alertness:   awake  Neuropsychiatric: General: normal, calm, and normal eye contact        Data     I have personally reviewed the following data over the past 24 hrs:    12.5 (H)  \   9.2 (L)   / 407     135 100 8.3 /  92   3.1 (L) 25 0.58 \       Imaging results reviewed over the past 24 hrs:   No results found for this or any previous visit (from the past 24 hours).

## 2025-06-19 NOTE — PROGRESS NOTES
Care Management Discharge Note    Discharge Date: 06/19/2025       Discharge Disposition: Home    Discharge Services: None    Discharge DME: None    Discharge Transportation: family or friend will provide    Private pay costs discussed: Not applicable    Does the patient's insurance plan have a 3 day qualifying hospital stay waiver?  No    PAS Confirmation Code:  na  Patient/family educated on Medicare website which has current facility and service quality ratings: no    Education Provided on the Discharge Plan: Yes  Persons Notified of Discharge Plans: HUC, charge RN  Patient/Family in Agreement with the Plan: yes    Handoff Referral Completed: No, handoff not indicated or clinically appropriate    Additional Information:  Patient will return home with spouse. No needs    Caty Snell RNCC

## 2025-06-19 NOTE — PROGRESS NOTES
[facility]  Daily Progress Note    Subjective  Patient reports feeling good. Denies any pain, nausea, dizziness. Eating and drinking without issue. Hopeful to go home today. AVSS.    Objective  Intake/Output last 24 hrs:    Intake/Output Summary (Last 24 hours) at 6/19/2025 0913  Last data filed at 6/19/2025 0818  Gross per 24 hour   Intake 1842 ml   Output 3950 ml   Net -2108 ml     Temp:  [97.8  F (36.6  C)-98.4  F (36.9  C)] 98.2  F (36.8  C)  Pulse:  [85-92] 85  Resp:  [16-18] 18  BP: (103-122)/(52-60) 106/52  SpO2:  [95 %-99 %] 98 %    Physical Exam:  General: awake, alert, laying in bed, in no acute distress  Head: normocephalic, atraumatic  Respiratory: non-labored breathing  Abdomen: soft, non-tender, non-distended   Stoma: pink, stool in bag with some form. 1975cc recorded.  Skin: No rashes or lesions  Musculoskeletal: moves all four extremities equally  Psychological: alert and oriented, answers questions appropriately    Pertinent Labs  Lab Results: personally reviewed.  Lab Results   Component Value Date     06/19/2025     06/18/2025     06/17/2025    CO2 25 06/19/2025    CO2 24 06/18/2025    CO2 19 06/17/2025    BUN 8.3 06/19/2025    BUN 11.8 06/18/2025    BUN 13.3 06/17/2025     Lab Results   Component Value Date    WBC 12.5 06/19/2025    WBC 15.0 06/18/2025    WBC 8.2 06/11/2025    HGB 9.2 06/19/2025    HGB 9.5 06/18/2025    HGB 10.6 06/12/2025    HCT 28.4 06/19/2025    HCT 28.9 06/18/2025    HCT 29.1 06/11/2025    MCV 90 06/19/2025    MCV 89 06/18/2025    MCV 87 06/16/2025     06/19/2025     06/18/2025     06/16/2025       Assessment/Plan: This is a 70 year old female with a history of an LAR with DLI on chemotherapy that is presenting with a high ileostomy output. Improved on current medications.     - Regular diet  - Strict I&Os  - Metamucil BID, max lomotil, Imodium 4mg QID   - Replace electrolytes  - Has outpatient fluids set up for tomorrow,  meeting with oncologist Monday  - OK to discharge from CRS perspective.     Discussed with Dr. Richard Figueroa PA-C  [facility]  [phone]..............................main

## 2025-06-19 NOTE — PLAN OF CARE
Problem: Adult Inpatient Plan of Care  Goal: Optimal Comfort and Wellbeing  Outcome: Progressing   Goal Outcome Evaluation:  Pt is alert and oriented, able to make needs known. Pt is up independently in her room. She is able to empty her own colostomy pouch. It put out 350ml this shift. VSS. Pt denies any pain. Pt resting inbetween cares, uneventful shift. Tele NSR.  January Urbina RN                             hair removal not indicated

## 2025-06-19 NOTE — PLAN OF CARE
Goal Outcome Evaluation:       Pouch started to show signs of leaking at the 10-11 o'clock location. Pouch changed. Managed mostly by pt. Second set of hands to help manage output.  Noted some maceration at the 6 o'clock location and at the 10-11 o'clock location. Stoma powder and skin prep used.   - IVF continue per order.   - Denies pain  - Potassium and magnesium replaced today. K+, Mg+ and Phos checks in the morning.   -On tele for electrolyte imbalance. Sinus rhythm.       Tabby Ty RN-BC      Problem: Fluid Volume Deficit  Goal: Fluid Balance  Outcome: Progressing     Problem: Acute Kidney Injury/Impairment  Goal: Fluid and Electrolyte Balance  Outcome: Progressing  Goal: Improved Oral Intake  Outcome: Progressing  Goal: Effective Renal Function  Outcome: Progressing  Intervention: Monitor and Support Renal Function  Recent Flowsheet Documentation  Taken 6/18/2025 1610 by Tabby Ty RN  Medication Review/Management:   medications reviewed   high-risk medications identified

## 2025-06-26 LAB — MISCELLANEOUS TEST 1 (ARUP): NORMAL

## 2025-06-30 ENCOUNTER — TRANSFERRED RECORDS (OUTPATIENT)
Dept: HEALTH INFORMATION MANAGEMENT | Facility: CLINIC | Age: 70
End: 2025-06-30
Payer: COMMERCIAL

## 2025-07-22 ENCOUNTER — TELEPHONE (OUTPATIENT)
Dept: NEUROLOGY | Facility: CLINIC | Age: 70
End: 2025-07-22
Payer: COMMERCIAL

## 2025-07-22 NOTE — TELEPHONE ENCOUNTER
Cancel appointment per patient. I called to try and reschedule appt due to provider no longer with [facility] and patient thinks its best to just cancel appointment at this time. I provided Kailee with our direct line if they decide to reschedule this appointment

## 2025-08-14 ENCOUNTER — HOSPITAL ENCOUNTER (OUTPATIENT)
Dept: RADIOLOGY | Facility: HOSPITAL | Age: 70
End: 2025-08-14
Attending: COLON & RECTAL SURGERY
Payer: COMMERCIAL

## 2025-08-14 DIAGNOSIS — Z85.048 HISTORY OF RECTAL CANCER: ICD-10-CM

## 2025-08-14 DIAGNOSIS — Z93.2 ILEOSTOMY IN PLACE (H): ICD-10-CM

## 2025-08-14 PROCEDURE — 74270 X-RAY XM COLON 1CNTRST STD: CPT

## 2025-08-14 RX ORDER — DIATRIZOATE MEGLUMINE AND DIATRIZOATE SODIUM 660; 100 MG/ML; MG/ML
120 SOLUTION ORAL; RECTAL ONCE
Status: COMPLETED | OUTPATIENT
Start: 2025-08-14 | End: 2025-08-14

## 2025-08-14 RX ADMIN — DIATRIZOATE MEGLUMINE AND DIATRIZOATE SODIUM 120 ML: 660; 100 SOLUTION ORAL; RECTAL at 13:49

## 2025-08-28 ENCOUNTER — ANESTHESIA (OUTPATIENT)
Dept: SURGERY | Facility: HOSPITAL | Age: 70
End: 2025-08-28
Payer: COMMERCIAL

## 2025-08-28 ENCOUNTER — HOSPITAL ENCOUNTER (INPATIENT)
Facility: HOSPITAL | Age: 70
End: 2025-08-28
Attending: COLON & RECTAL SURGERY | Admitting: COLON & RECTAL SURGERY
Payer: COMMERCIAL

## 2025-08-28 ENCOUNTER — ANESTHESIA EVENT (OUTPATIENT)
Dept: SURGERY | Facility: HOSPITAL | Age: 70
End: 2025-08-28
Payer: COMMERCIAL

## 2025-08-28 VITALS
DIASTOLIC BLOOD PRESSURE: 65 MMHG | TEMPERATURE: 98.6 F | BODY MASS INDEX: 17.12 KG/M2 | OXYGEN SATURATION: 97 % | RESPIRATION RATE: 16 BRPM | HEART RATE: 95 BPM | HEIGHT: 63 IN | WEIGHT: 96.6 LBS | SYSTOLIC BLOOD PRESSURE: 144 MMHG

## 2025-08-28 PROBLEM — Z93.2 ILEOSTOMY IN PLACE (H): Status: ACTIVE | Noted: 2025-08-28

## 2025-08-28 LAB
ABO + RH BLD: NORMAL
BLD GP AB SCN SERPL QL: NEGATIVE
GLUCOSE BLDC GLUCOMTR-MCNC: 107 MG/DL (ref 70–99)
GLUCOSE BLDC GLUCOMTR-MCNC: 117 MG/DL (ref 70–99)
GLUCOSE BLDC GLUCOMTR-MCNC: 171 MG/DL (ref 70–99)
HGB BLD-MCNC: 10.8 G/DL (ref 11.7–15.7)
HOLD SPECIMEN: NORMAL
MCV RBC AUTO: 93.9 FL (ref 78–100)
SPECIMEN EXP DATE BLD: NORMAL

## 2025-08-28 PROCEDURE — 250N000013 HC RX MED GY IP 250 OP 250 PS 637: Performed by: STUDENT IN AN ORGANIZED HEALTH CARE EDUCATION/TRAINING PROGRAM

## 2025-08-28 PROCEDURE — 250N000009 HC RX 250: Performed by: NURSE ANESTHETIST, CERTIFIED REGISTERED

## 2025-08-28 PROCEDURE — 250N000011 HC RX IP 250 OP 636: Performed by: STUDENT IN AN ORGANIZED HEALTH CARE EDUCATION/TRAINING PROGRAM

## 2025-08-28 PROCEDURE — 36415 COLL VENOUS BLD VENIPUNCTURE: CPT | Performed by: STUDENT IN AN ORGANIZED HEALTH CARE EDUCATION/TRAINING PROGRAM

## 2025-08-28 PROCEDURE — 250N000011 HC RX IP 250 OP 636: Performed by: NURSE ANESTHETIST, CERTIFIED REGISTERED

## 2025-08-28 PROCEDURE — 250N000009 HC RX 250: Performed by: COLON & RECTAL SURGERY

## 2025-08-28 PROCEDURE — 258N000003 HC RX IP 258 OP 636: Performed by: STUDENT IN AN ORGANIZED HEALTH CARE EDUCATION/TRAINING PROGRAM

## 2025-08-28 PROCEDURE — 258N000003 HC RX IP 258 OP 636: Performed by: NURSE ANESTHETIST, CERTIFIED REGISTERED

## 2025-08-28 PROCEDURE — 250N000009 HC RX 250: Performed by: ANESTHESIOLOGY

## 2025-08-28 PROCEDURE — 258N000003 HC RX IP 258 OP 636: Performed by: ANESTHESIOLOGY

## 2025-08-28 PROCEDURE — 86900 BLOOD TYPING SEROLOGIC ABO: CPT | Performed by: STUDENT IN AN ORGANIZED HEALTH CARE EDUCATION/TRAINING PROGRAM

## 2025-08-28 PROCEDURE — 120N000001 HC R&B MED SURG/OB

## 2025-08-28 PROCEDURE — P9045 ALBUMIN (HUMAN), 5%, 250 ML: HCPCS | Mod: JZ | Performed by: NURSE ANESTHETIST, CERTIFIED REGISTERED

## 2025-08-28 PROCEDURE — 85018 HEMOGLOBIN: CPT | Performed by: STUDENT IN AN ORGANIZED HEALTH CARE EDUCATION/TRAINING PROGRAM

## 2025-08-28 RX ORDER — LIDOCAINE 40 MG/G
CREAM TOPICAL
Status: ACTIVE | OUTPATIENT
Start: 2025-08-28

## 2025-08-28 RX ORDER — DEXAMETHASONE SODIUM PHOSPHATE 10 MG/ML
4 INJECTION, SOLUTION INTRAMUSCULAR; INTRAVENOUS
Status: DISCONTINUED | OUTPATIENT
Start: 2025-08-28 | End: 2025-08-28

## 2025-08-28 RX ORDER — HYDROMORPHONE HYDROCHLORIDE 1 MG/ML
0.4 INJECTION, SOLUTION INTRAMUSCULAR; INTRAVENOUS; SUBCUTANEOUS EVERY 5 MIN PRN
Status: DISCONTINUED | OUTPATIENT
Start: 2025-08-28 | End: 2025-08-28

## 2025-08-28 RX ORDER — METRONIDAZOLE 500 MG/100ML
500 INJECTION, SOLUTION INTRAVENOUS
Status: COMPLETED | OUTPATIENT
Start: 2025-08-28 | End: 2025-08-28

## 2025-08-28 RX ORDER — FENTANYL CITRATE 50 UG/ML
50 INJECTION, SOLUTION INTRAMUSCULAR; INTRAVENOUS EVERY 5 MIN PRN
Status: DISCONTINUED | OUTPATIENT
Start: 2025-08-28 | End: 2025-08-28

## 2025-08-28 RX ORDER — NALOXONE HYDROCHLORIDE 0.4 MG/ML
0.2 INJECTION, SOLUTION INTRAMUSCULAR; INTRAVENOUS; SUBCUTANEOUS
Status: ACTIVE | OUTPATIENT
Start: 2025-08-28

## 2025-08-28 RX ORDER — NALOXONE HYDROCHLORIDE 0.4 MG/ML
0.1 INJECTION, SOLUTION INTRAMUSCULAR; INTRAVENOUS; SUBCUTANEOUS
Status: DISCONTINUED | OUTPATIENT
Start: 2025-08-28 | End: 2025-08-28

## 2025-08-28 RX ORDER — ENOXAPARIN SODIUM 100 MG/ML
30 INJECTION SUBCUTANEOUS EVERY 24 HOURS
Status: ACTIVE | OUTPATIENT
Start: 2025-08-29

## 2025-08-28 RX ORDER — HYDROMORPHONE HYDROCHLORIDE 1 MG/ML
0.2 INJECTION, SOLUTION INTRAMUSCULAR; INTRAVENOUS; SUBCUTANEOUS EVERY 4 HOURS PRN
Status: DISPENSED | OUTPATIENT
Start: 2025-08-28

## 2025-08-28 RX ORDER — CALCIUM CARBONATE 500 MG/1
1000 TABLET, CHEWABLE ORAL 4 TIMES DAILY PRN
Status: ACTIVE | OUTPATIENT
Start: 2025-08-28

## 2025-08-28 RX ORDER — SODIUM CHLORIDE, SODIUM LACTATE, POTASSIUM CHLORIDE, CALCIUM CHLORIDE 600; 310; 30; 20 MG/100ML; MG/100ML; MG/100ML; MG/100ML
INJECTION, SOLUTION INTRAVENOUS CONTINUOUS
Status: ACTIVE | OUTPATIENT
Start: 2025-08-28

## 2025-08-28 RX ORDER — ONDANSETRON 2 MG/ML
INJECTION INTRAMUSCULAR; INTRAVENOUS PRN
Status: DISCONTINUED | OUTPATIENT
Start: 2025-08-28 | End: 2025-08-28

## 2025-08-28 RX ORDER — ACETAMINOPHEN 325 MG/1
975 TABLET ORAL EVERY 6 HOURS PRN
Status: ACTIVE | OUTPATIENT
Start: 2025-08-28

## 2025-08-28 RX ORDER — MAGNESIUM HYDROXIDE 1200 MG/15ML
LIQUID ORAL PRN
Status: DISCONTINUED | OUTPATIENT
Start: 2025-08-28 | End: 2025-08-28 | Stop reason: HOSPADM

## 2025-08-28 RX ORDER — HYDROMORPHONE HYDROCHLORIDE 1 MG/ML
0.2 INJECTION, SOLUTION INTRAMUSCULAR; INTRAVENOUS; SUBCUTANEOUS
Status: DISCONTINUED | OUTPATIENT
Start: 2025-08-28 | End: 2025-08-28

## 2025-08-28 RX ORDER — PROPOFOL 10 MG/ML
INJECTION, EMULSION INTRAVENOUS PRN
Status: DISCONTINUED | OUTPATIENT
Start: 2025-08-28 | End: 2025-08-28

## 2025-08-28 RX ORDER — HYDROMORPHONE HYDROCHLORIDE 1 MG/ML
0.2 INJECTION, SOLUTION INTRAMUSCULAR; INTRAVENOUS; SUBCUTANEOUS EVERY 5 MIN PRN
Status: DISCONTINUED | OUTPATIENT
Start: 2025-08-28 | End: 2025-08-28

## 2025-08-28 RX ORDER — ONDANSETRON 2 MG/ML
4 INJECTION INTRAMUSCULAR; INTRAVENOUS EVERY 30 MIN PRN
Status: DISCONTINUED | OUTPATIENT
Start: 2025-08-28 | End: 2025-08-28

## 2025-08-28 RX ORDER — DEXAMETHASONE SODIUM PHOSPHATE 10 MG/ML
INJECTION, SOLUTION INTRAMUSCULAR; INTRAVENOUS PRN
Status: DISCONTINUED | OUTPATIENT
Start: 2025-08-28 | End: 2025-08-28

## 2025-08-28 RX ORDER — FENTANYL CITRATE 50 UG/ML
INJECTION, SOLUTION INTRAMUSCULAR; INTRAVENOUS PRN
Status: DISCONTINUED | OUTPATIENT
Start: 2025-08-28 | End: 2025-08-28

## 2025-08-28 RX ORDER — ONDANSETRON 4 MG/1
4 TABLET, ORALLY DISINTEGRATING ORAL EVERY 6 HOURS PRN
Status: ACTIVE | OUTPATIENT
Start: 2025-08-28

## 2025-08-28 RX ORDER — CEFAZOLIN SODIUM/WATER 2 G/20 ML
2 SYRINGE (ML) INTRAVENOUS SEE ADMIN INSTRUCTIONS
Status: DISCONTINUED | OUTPATIENT
Start: 2025-08-28 | End: 2025-08-28 | Stop reason: HOSPADM

## 2025-08-28 RX ORDER — ACETAMINOPHEN 325 MG/1
975 TABLET ORAL ONCE
Status: COMPLETED | OUTPATIENT
Start: 2025-08-28 | End: 2025-08-28

## 2025-08-28 RX ORDER — SODIUM CHLORIDE, SODIUM LACTATE, POTASSIUM CHLORIDE, CALCIUM CHLORIDE 600; 310; 30; 20 MG/100ML; MG/100ML; MG/100ML; MG/100ML
INJECTION, SOLUTION INTRAVENOUS CONTINUOUS
Status: DISCONTINUED | OUTPATIENT
Start: 2025-08-28 | End: 2025-08-28

## 2025-08-28 RX ORDER — HYDROMORPHONE HYDROCHLORIDE 1 MG/ML
0.1 INJECTION, SOLUTION INTRAMUSCULAR; INTRAVENOUS; SUBCUTANEOUS EVERY 4 HOURS PRN
Status: DISPENSED | OUTPATIENT
Start: 2025-08-28

## 2025-08-28 RX ORDER — NALOXONE HYDROCHLORIDE 0.4 MG/ML
0.4 INJECTION, SOLUTION INTRAMUSCULAR; INTRAVENOUS; SUBCUTANEOUS
Status: ACTIVE | OUTPATIENT
Start: 2025-08-28

## 2025-08-28 RX ORDER — MORPHINE SULFATE 1 MG/ML
150 INJECTION, SOLUTION EPIDURAL; INTRATHECAL; INTRAVENOUS ONCE
Refills: 0 | Status: DISCONTINUED | OUTPATIENT
Start: 2025-08-28 | End: 2025-08-28 | Stop reason: HOSPADM

## 2025-08-28 RX ORDER — ONDANSETRON 2 MG/ML
4 INJECTION INTRAMUSCULAR; INTRAVENOUS EVERY 6 HOURS PRN
Status: ACTIVE | OUTPATIENT
Start: 2025-08-28

## 2025-08-28 RX ORDER — ACETAMINOPHEN 650 MG/1
650 SUPPOSITORY RECTAL EVERY 4 HOURS PRN
Status: ACTIVE | OUTPATIENT
Start: 2025-08-28

## 2025-08-28 RX ORDER — HEPARIN SODIUM 5000 [USP'U]/.5ML
5000 INJECTION, SOLUTION INTRAVENOUS; SUBCUTANEOUS
Status: COMPLETED | OUTPATIENT
Start: 2025-08-28 | End: 2025-08-28

## 2025-08-28 RX ORDER — FENTANYL CITRATE 50 UG/ML
25 INJECTION, SOLUTION INTRAMUSCULAR; INTRAVENOUS EVERY 5 MIN PRN
Status: DISCONTINUED | OUTPATIENT
Start: 2025-08-28 | End: 2025-08-28

## 2025-08-28 RX ORDER — BUPIVACAINE HCL/EPINEPHRINE 0.25-.0005
VIAL (ML) INJECTION PRN
Status: DISCONTINUED | OUTPATIENT
Start: 2025-08-28 | End: 2025-08-28 | Stop reason: HOSPADM

## 2025-08-28 RX ORDER — LIDOCAINE 40 MG/G
CREAM TOPICAL
Status: DISCONTINUED | OUTPATIENT
Start: 2025-08-28 | End: 2025-08-28 | Stop reason: HOSPADM

## 2025-08-28 RX ORDER — SODIUM CHLORIDE, SODIUM LACTATE, POTASSIUM CHLORIDE, CALCIUM CHLORIDE 600; 310; 30; 20 MG/100ML; MG/100ML; MG/100ML; MG/100ML
INJECTION, SOLUTION INTRAVENOUS CONTINUOUS
Status: DISCONTINUED | OUTPATIENT
Start: 2025-08-28 | End: 2025-08-28 | Stop reason: HOSPADM

## 2025-08-28 RX ORDER — ONDANSETRON 2 MG/ML
4 INJECTION INTRAMUSCULAR; INTRAVENOUS ONCE
Status: COMPLETED | OUTPATIENT
Start: 2025-08-28 | End: 2025-08-28

## 2025-08-28 RX ORDER — CEFAZOLIN SODIUM/WATER 2 G/20 ML
2 SYRINGE (ML) INTRAVENOUS
Status: COMPLETED | OUTPATIENT
Start: 2025-08-28 | End: 2025-08-28

## 2025-08-28 RX ORDER — HYDROMORPHONE HYDROCHLORIDE 1 MG/ML
0.4 INJECTION, SOLUTION INTRAMUSCULAR; INTRAVENOUS; SUBCUTANEOUS
Status: DISCONTINUED | OUTPATIENT
Start: 2025-08-28 | End: 2025-08-28

## 2025-08-28 RX ORDER — DEXTROSE MONOHYDRATE 25 G/50ML
25-50 INJECTION, SOLUTION INTRAVENOUS
Status: ACTIVE | OUTPATIENT
Start: 2025-08-28

## 2025-08-28 RX ORDER — ATORVASTATIN CALCIUM 40 MG/1
40 TABLET, FILM COATED ORAL DAILY
Status: DISPENSED | OUTPATIENT
Start: 2025-08-28

## 2025-08-28 RX ORDER — ENOXAPARIN SODIUM 100 MG/ML
40 INJECTION SUBCUTANEOUS EVERY 24 HOURS
Status: CANCELLED | OUTPATIENT
Start: 2025-08-29

## 2025-08-28 RX ORDER — PROCHLORPERAZINE MALEATE 5 MG/1
5 TABLET ORAL EVERY 6 HOURS PRN
Status: ACTIVE | OUTPATIENT
Start: 2025-08-28

## 2025-08-28 RX ORDER — NICOTINE POLACRILEX 4 MG
15-30 LOZENGE BUCCAL
Status: ACTIVE | OUTPATIENT
Start: 2025-08-28

## 2025-08-28 RX ORDER — SODIUM CHLORIDE, SODIUM LACTATE, POTASSIUM CHLORIDE, CALCIUM CHLORIDE 600; 310; 30; 20 MG/100ML; MG/100ML; MG/100ML; MG/100ML
INJECTION, SOLUTION INTRAVENOUS CONTINUOUS
Status: CANCELLED | OUTPATIENT
Start: 2025-08-28

## 2025-08-28 RX ORDER — ONDANSETRON 4 MG/1
4 TABLET, ORALLY DISINTEGRATING ORAL EVERY 30 MIN PRN
Status: DISCONTINUED | OUTPATIENT
Start: 2025-08-28 | End: 2025-08-28

## 2025-08-28 RX ADMIN — HYDROMORPHONE HYDROCHLORIDE 0.2 MG: 1 INJECTION, SOLUTION INTRAMUSCULAR; INTRAVENOUS; SUBCUTANEOUS at 17:13

## 2025-08-28 RX ADMIN — Medication 2 G: at 14:07

## 2025-08-28 RX ADMIN — ACETAMINOPHEN 975 MG: 325 TABLET ORAL at 11:39

## 2025-08-28 RX ADMIN — PHENYLEPHRINE HYDROCHLORIDE 100 MCG: 10 INJECTION INTRAVENOUS at 15:06

## 2025-08-28 RX ADMIN — ONDANSETRON 4 MG: 2 INJECTION, SOLUTION INTRAMUSCULAR; INTRAVENOUS at 12:28

## 2025-08-28 RX ADMIN — ROCURONIUM 30 MG: 50 INJECTION, SOLUTION INTRAVENOUS at 14:17

## 2025-08-28 RX ADMIN — FENTANYL CITRATE 50 MCG: 50 INJECTION, SOLUTION INTRAMUSCULAR; INTRAVENOUS at 14:28

## 2025-08-28 RX ADMIN — ALBUMIN HUMAN: 0.05 INJECTION, SOLUTION INTRAVENOUS at 14:20

## 2025-08-28 RX ADMIN — PHENYLEPHRINE HYDROCHLORIDE 0.3 MCG/KG/MIN: 10 INJECTION INTRAVENOUS at 15:07

## 2025-08-28 RX ADMIN — SODIUM CHLORIDE, SODIUM LACTATE, POTASSIUM CHLORIDE, AND CALCIUM CHLORIDE: .6; .31; .03; .02 INJECTION, SOLUTION INTRAVENOUS at 17:13

## 2025-08-28 RX ADMIN — SUGAMMADEX 100 MG: 100 INJECTION, SOLUTION INTRAVENOUS at 15:42

## 2025-08-28 RX ADMIN — HEPARIN SODIUM 5000 UNITS: 5000 INJECTION, SOLUTION INTRAVENOUS; SUBCUTANEOUS at 12:24

## 2025-08-28 RX ADMIN — ROCURONIUM 30 MG: 50 INJECTION, SOLUTION INTRAVENOUS at 14:48

## 2025-08-28 RX ADMIN — PHENYLEPHRINE HYDROCHLORIDE 100 MCG: 10 INJECTION INTRAVENOUS at 14:33

## 2025-08-28 RX ADMIN — ATORVASTATIN CALCIUM 40 MG: 40 TABLET, FILM COATED ORAL at 20:44

## 2025-08-28 RX ADMIN — FENTANYL CITRATE 50 MCG: 50 INJECTION, SOLUTION INTRAMUSCULAR; INTRAVENOUS at 14:17

## 2025-08-28 RX ADMIN — PHENYLEPHRINE HYDROCHLORIDE 100 MCG: 10 INJECTION INTRAVENOUS at 14:55

## 2025-08-28 RX ADMIN — METRONIDAZOLE 500 MG: 500 INJECTION, SOLUTION INTRAVENOUS at 12:14

## 2025-08-28 RX ADMIN — PHENYLEPHRINE HYDROCHLORIDE 100 MCG: 10 INJECTION INTRAVENOUS at 14:31

## 2025-08-28 RX ADMIN — SODIUM CHLORIDE, SODIUM LACTATE, POTASSIUM CHLORIDE, AND CALCIUM CHLORIDE: .6; .31; .03; .02 INJECTION, SOLUTION INTRAVENOUS at 23:50

## 2025-08-28 RX ADMIN — SODIUM CHLORIDE, SODIUM LACTATE, POTASSIUM CHLORIDE, AND CALCIUM CHLORIDE: .6; .31; .03; .02 INJECTION, SOLUTION INTRAVENOUS at 14:59

## 2025-08-28 RX ADMIN — DEXAMETHASONE SODIUM PHOSPHATE 10 MG: 10 INJECTION, SOLUTION INTRAMUSCULAR; INTRAVENOUS at 14:53

## 2025-08-28 RX ADMIN — ONDANSETRON 4 MG: 2 INJECTION INTRAMUSCULAR; INTRAVENOUS at 14:53

## 2025-08-28 RX ADMIN — LIDOCAINE HYDROCHLORIDE 50 MG: 10 INJECTION, SOLUTION INFILTRATION; PERINEURAL at 14:17

## 2025-08-28 RX ADMIN — HYDROMORPHONE HYDROCHLORIDE 0.1 MG: 1 INJECTION, SOLUTION INTRAMUSCULAR; INTRAVENOUS; SUBCUTANEOUS at 21:35

## 2025-08-28 RX ADMIN — PROPOFOL 150 MG: 10 INJECTION, EMULSION INTRAVENOUS at 14:19

## 2025-08-28 RX ADMIN — PHENYLEPHRINE HYDROCHLORIDE 100 MCG: 10 INJECTION INTRAVENOUS at 15:09

## 2025-08-28 RX ADMIN — SODIUM CHLORIDE, SODIUM LACTATE, POTASSIUM CHLORIDE, AND CALCIUM CHLORIDE: .6; .31; .03; .02 INJECTION, SOLUTION INTRAVENOUS at 12:12

## 2025-08-28 RX ADMIN — MIDAZOLAM HYDROCHLORIDE 2 MG: 1 INJECTION, SOLUTION INTRAMUSCULAR; INTRAVENOUS at 14:04

## 2025-08-28 RX ADMIN — PHENYLEPHRINE HYDROCHLORIDE 100 MCG: 10 INJECTION INTRAVENOUS at 14:19

## 2025-08-28 RX ADMIN — PHENYLEPHRINE HYDROCHLORIDE 100 MCG: 10 INJECTION INTRAVENOUS at 14:36

## 2025-08-28 ASSESSMENT — ACTIVITIES OF DAILY LIVING (ADL)
ADLS_ACUITY_SCORE: 35
ADLS_ACUITY_SCORE: 35
ADLS_ACUITY_SCORE: 33
ADLS_ACUITY_SCORE: 33
ADLS_ACUITY_SCORE: 35
ADLS_ACUITY_SCORE: 36
ADLS_ACUITY_SCORE: 35
ADLS_ACUITY_SCORE: 35
ADLS_ACUITY_SCORE: 33
ADLS_ACUITY_SCORE: 35

## 2025-08-30 LAB
GLUCOSE BLDC GLUCOMTR-MCNC: 111 MG/DL (ref 70–99)
GLUCOSE BLDC GLUCOMTR-MCNC: 111 MG/DL (ref 70–99)
GLUCOSE BLDC GLUCOMTR-MCNC: 123 MG/DL (ref 70–99)

## (undated) DEVICE — STPL DAVINCI SUREFORM 60MM RELOAD GREEN 48360G

## (undated) DEVICE — PREP POVIDONE-IODINE 10% SOLUTION 4OZ BOTTLE MDS093944

## (undated) DEVICE — SUTURE PASSOR W/GUIDE RSG-14F-4-WG

## (undated) DEVICE — MAT FLOOR WATERPROOF BACKSHEET FMBP30

## (undated) DEVICE — SU PDS 1 36IN VIOLET CTX PDP371T

## (undated) DEVICE — DRAPE IOBAN INCISE 23X17" 6650EZ

## (undated) DEVICE — KIT SIGMOIDOSCOPE ENDOSCOPIC LIGHTED 18FR KI613/10

## (undated) DEVICE — PREP CHLORAPREP 26ML TINTED HI-LITE ORANGE 930815

## (undated) DEVICE — POSITIONING KIT THE PINK PAD XL 40X20X1IN 40583 (COI)

## (undated) DEVICE — PITCHER STERILE 1000ML  SSK9004A

## (undated) DEVICE — DAVINCI XI DRAPE ARM 470015

## (undated) DEVICE — ANTIFOG SOLUTION SEE SHARP 150M TROCAR SWABS 30978 (COI)

## (undated) DEVICE — DAVINCI XI SEAL UNIVERSAL 5-12MM 470500

## (undated) DEVICE — SU VICRYL+ 0 TIES 18IN UND VCP112G

## (undated) DEVICE — SU VICRYL+ 3-0 27IN SH UND VCP416H

## (undated) DEVICE — CATH TRAY FOLEY SURESTEP 16FR DRAIN BAG STATOCK A899916

## (undated) DEVICE — TUBING FILTER TRI-LUMEN AIRSEAL ASC-EVAC1

## (undated) DEVICE — DAVINCI XI HANDPIECE ESU VESSEL SEALER 8MM EXT 480422

## (undated) DEVICE — DRAPE LEGGINGS 8421

## (undated) DEVICE — LUBRICANT INST ELECTROLUBE EL101

## (undated) DEVICE — DAVINCI XI DRAPE COLUMN 470341

## (undated) DEVICE — GLOVE UNDER INDICATOR PI SZ 6.5 LF 41665

## (undated) DEVICE — JELLY LUBRICATING SURGILUBE 2OZ TUBE

## (undated) DEVICE — SUCTION STRYKERFLOW II 250-070-500

## (undated) DEVICE — SUCTION TIP POOLE STERILE 35040

## (undated) DEVICE — ENDO TROCAR FIRST ENTRY KII FIOS Z-THRD 05X100MM CTF03

## (undated) DEVICE — STPL CIRCULAR 29MM CVD CDH29P

## (undated) DEVICE — TUBING SUCTION MEDI-VAC SOFT 3/16"X20' N520A

## (undated) DEVICE — SOL WATER IRRIG 1000ML BOTTLE 2F7114

## (undated) DEVICE — SUTURE VICRYL+ 3-0 18 SH/CR UND VCP864

## (undated) DEVICE — SYR 50ML SLIP TIP W/O NDL 309654

## (undated) DEVICE — SUTURE VICRYL+ 2-0 27IN SH UND VCP417H

## (undated) DEVICE — CUSTOM PACK COLON CLOSING SBA5BCCHEA

## (undated) DEVICE — STPL DAVINCI SUREFORM 60MM STR 480460

## (undated) DEVICE — ESU PENCIL SMOKE EVAC W/ROCKER SWITCH 0703-047-000

## (undated) DEVICE — SUTURE VICRYL+ 2-0 27IN CT-1 UND VCP259H

## (undated) DEVICE — SU DERMABOND ADVANCED .7ML DNX12

## (undated) DEVICE — BASIN EMESIS STERILE  SSK9005A

## (undated) DEVICE — SU SILK 2-0 FS-1 18" 685G

## (undated) DEVICE — GLOVE PI ULTRATCH M LF SZ 6.5 PF CUFF TEXT STRL LF 42665

## (undated) DEVICE — SUCTION TIP YANKAUER W/O VENT K86

## (undated) DEVICE — DAVINCI HOT SHEARS TIP COVER  400180

## (undated) DEVICE — PROTECTOR ARM STANDARD ONE STEP 40433 (COI)

## (undated) DEVICE — DAVINCI XI REDUCER 8-12MM 470381

## (undated) DEVICE — DRAPE POUCH INSTRUMENT 3 POCKET 1018L

## (undated) DEVICE — ESU GROUND PAD ADULT REM W/15' CORD E7507DB

## (undated) DEVICE — CUSTOM PACK LAP CHOLE SBA5BLCHEA

## (undated) DEVICE — SPONGE LAP 18X18" X8435

## (undated) DEVICE — STPL DAVINCI SUREFORM 60MM RELOAD WHITE 48360W

## (undated) DEVICE — DAVINCI XI OBTURATOR BLADELESS 8MM 470359

## (undated) DEVICE — GRASPER LAPAROSCOPIC EPIX 5MMX35CM C4130

## (undated) DEVICE — SU PROLENE 2-0 SHDA 36" 8523H

## (undated) DEVICE — SU MONOCRYL+ 4-0 18IN PS2 UND MCP496G

## (undated) DEVICE — OSTOMY POUCH 12IN 2.5- IN CERAMIDE PLS FLT 1 PC CTF 8931

## (undated) DEVICE — Device

## (undated) DEVICE — STRAP CATH LEG ADJUSTABLE 0814-8200

## (undated) DEVICE — TROCAR PORT BLADELESS 5X100MM IAS5-100LP

## (undated) RX ORDER — FENTANYL CITRATE 50 UG/ML
INJECTION, SOLUTION INTRAMUSCULAR; INTRAVENOUS
Status: DISPENSED
Start: 2025-08-28

## (undated) RX ORDER — BUPIVACAINE HCL/EPINEPHRINE 0.25-.0005
VIAL (ML) INJECTION
Status: DISPENSED
Start: 2025-08-28

## (undated) RX ORDER — INDOCYANINE GREEN AND WATER 25 MG
KIT INJECTION
Status: DISPENSED
Start: 2025-03-31

## (undated) RX ORDER — ONDANSETRON 2 MG/ML
INJECTION INTRAMUSCULAR; INTRAVENOUS
Status: DISPENSED
Start: 2025-03-31

## (undated) RX ORDER — LIDOCAINE HYDROCHLORIDE 10 MG/ML
INJECTION, SOLUTION EPIDURAL; INFILTRATION; INTRACAUDAL; PERINEURAL
Status: DISPENSED
Start: 2025-08-28

## (undated) RX ORDER — DEXAMETHASONE SODIUM PHOSPHATE 10 MG/ML
INJECTION, SOLUTION INTRAMUSCULAR; INTRAVENOUS
Status: DISPENSED
Start: 2025-03-31

## (undated) RX ORDER — FENTANYL CITRATE 50 UG/ML
INJECTION, SOLUTION INTRAMUSCULAR; INTRAVENOUS
Status: DISPENSED
Start: 2025-03-31

## (undated) RX ORDER — LIDOCAINE HYDROCHLORIDE 10 MG/ML
INJECTION, SOLUTION EPIDURAL; INFILTRATION; INTRACAUDAL; PERINEURAL
Status: DISPENSED
Start: 2025-03-31

## (undated) RX ORDER — WATER 10 ML/10ML
INJECTION INTRAMUSCULAR; INTRAVENOUS; SUBCUTANEOUS
Status: DISPENSED
Start: 2025-03-31

## (undated) RX ORDER — BUPIVACAINE HYDROCHLORIDE 2.5 MG/ML
INJECTION, SOLUTION EPIDURAL; INFILTRATION; INTRACAUDAL; PERINEURAL
Status: DISPENSED
Start: 2025-03-31

## (undated) RX ORDER — BUPIVACAINE HYDROCHLORIDE 2.5 MG/ML
INJECTION, SOLUTION EPIDURAL; INFILTRATION; INTRACAUDAL; PERINEURAL
Status: DISPENSED
Start: 2025-08-28

## (undated) RX ORDER — PROPOFOL 10 MG/ML
INJECTION, EMULSION INTRAVENOUS
Status: DISPENSED
Start: 2025-03-31